# Patient Record
Sex: MALE | Race: WHITE | NOT HISPANIC OR LATINO | Employment: UNEMPLOYED | URBAN - NONMETROPOLITAN AREA
[De-identification: names, ages, dates, MRNs, and addresses within clinical notes are randomized per-mention and may not be internally consistent; named-entity substitution may affect disease eponyms.]

---

## 2017-01-10 ENCOUNTER — TRANSFERRED RECORDS (OUTPATIENT)
Dept: HEALTH INFORMATION MANAGEMENT | Facility: HOSPITAL | Age: 47
End: 2017-01-10

## 2017-05-20 ENCOUNTER — APPOINTMENT (OUTPATIENT)
Dept: CT IMAGING | Facility: CLINIC | Age: 47
End: 2017-05-20
Attending: NURSE PRACTITIONER
Payer: COMMERCIAL

## 2017-05-20 ENCOUNTER — HOSPITAL ENCOUNTER (EMERGENCY)
Facility: CLINIC | Age: 47
Discharge: PSYCHIATRIC OR SUBSTANCE USE FACILITY | End: 2017-05-20
Attending: NURSE PRACTITIONER | Admitting: NURSE PRACTITIONER
Payer: COMMERCIAL

## 2017-05-20 ENCOUNTER — APPOINTMENT (OUTPATIENT)
Dept: GENERAL RADIOLOGY | Facility: CLINIC | Age: 47
End: 2017-05-20
Attending: NURSE PRACTITIONER
Payer: COMMERCIAL

## 2017-05-20 VITALS
DIASTOLIC BLOOD PRESSURE: 60 MMHG | BODY MASS INDEX: 26.36 KG/M2 | HEART RATE: 90 BPM | WEIGHT: 189 LBS | RESPIRATION RATE: 18 BRPM | SYSTOLIC BLOOD PRESSURE: 105 MMHG | OXYGEN SATURATION: 99 %

## 2017-05-20 DIAGNOSIS — R45.851 SUICIDAL IDEATION: ICD-10-CM

## 2017-05-20 DIAGNOSIS — T14.91XA SUICIDE ATTEMPT (H): ICD-10-CM

## 2017-05-20 DIAGNOSIS — T71.162A SUICIDE ATTEMPT BY HANGING, INITIAL ENCOUNTER (H): ICD-10-CM

## 2017-05-20 DIAGNOSIS — R44.3 HALLUCINATIONS: ICD-10-CM

## 2017-05-20 DIAGNOSIS — R45.851 PLANNING TO COMMIT SUICIDE: ICD-10-CM

## 2017-05-20 LAB
ALBUMIN SERPL-MCNC: 3.7 G/DL (ref 3.4–5)
ALP SERPL-CCNC: 88 U/L (ref 40–150)
ALT SERPL W P-5'-P-CCNC: 80 U/L (ref 0–70)
AMPHETAMINES UR QL: ABNORMAL NG/ML
ANION GAP SERPL CALCULATED.3IONS-SCNC: 9 MMOL/L (ref 3–14)
AST SERPL W P-5'-P-CCNC: 132 U/L (ref 0–45)
BARBITURATES UR QL SCN: ABNORMAL NG/ML
BASOPHILS # BLD AUTO: 0 10E9/L (ref 0–0.2)
BASOPHILS NFR BLD AUTO: 0.5 %
BENZODIAZ UR QL SCN: ABNORMAL NG/ML
BILIRUB SERPL-MCNC: 1 MG/DL (ref 0.2–1.3)
BUN SERPL-MCNC: 21 MG/DL (ref 7–30)
BUPRENORPHINE UR QL: ABNORMAL NG/ML
CALCIUM SERPL-MCNC: 8.4 MG/DL (ref 8.5–10.1)
CANNABINOIDS UR QL: ABNORMAL NG/ML
CHLORIDE SERPL-SCNC: 104 MMOL/L (ref 94–109)
CO2 SERPL-SCNC: 26 MMOL/L (ref 20–32)
COCAINE UR QL SCN: ABNORMAL NG/ML
CREAT SERPL-MCNC: 0.62 MG/DL (ref 0.66–1.25)
D-METHAMPHET UR QL: ABNORMAL NG/ML
DIFFERENTIAL METHOD BLD: ABNORMAL
EOSINOPHIL # BLD AUTO: 0.1 10E9/L (ref 0–0.7)
EOSINOPHIL NFR BLD AUTO: 1 %
ERYTHROCYTE [DISTWIDTH] IN BLOOD BY AUTOMATED COUNT: 13 % (ref 10–15)
ETHANOL SERPL-MCNC: <0.01 G/DL
GFR SERPL CREATININE-BSD FRML MDRD: ABNORMAL ML/MIN/1.7M2
GLUCOSE SERPL-MCNC: 88 MG/DL (ref 70–99)
HCT VFR BLD AUTO: 35.7 % (ref 40–53)
HGB BLD-MCNC: 11.8 G/DL (ref 13.3–17.7)
IMM GRANULOCYTES # BLD: 0 10E9/L (ref 0–0.4)
IMM GRANULOCYTES NFR BLD: 0.3 %
LYMPHOCYTES # BLD AUTO: 1.7 10E9/L (ref 0.8–5.3)
LYMPHOCYTES NFR BLD AUTO: 21.3 %
MCH RBC QN AUTO: 29.6 PG (ref 26.5–33)
MCHC RBC AUTO-ENTMCNC: 33.1 G/DL (ref 31.5–36.5)
MCV RBC AUTO: 90 FL (ref 78–100)
METHADONE UR QL SCN: ABNORMAL NG/ML
MONOCYTES # BLD AUTO: 0.8 10E9/L (ref 0–1.3)
MONOCYTES NFR BLD AUTO: 10.2 %
NEUTROPHILS # BLD AUTO: 5.2 10E9/L (ref 1.6–8.3)
NEUTROPHILS NFR BLD AUTO: 66.7 %
OPIATES UR QL SCN: ABNORMAL NG/ML
OXYCODONE UR QL SCN: ABNORMAL NG/ML
PCP UR QL SCN: ABNORMAL NG/ML
PLATELET # BLD AUTO: 228 10E9/L (ref 150–450)
POTASSIUM SERPL-SCNC: 3.5 MMOL/L (ref 3.4–5.3)
PROPOXYPH UR QL: ABNORMAL NG/ML
PROT SERPL-MCNC: 7.6 G/DL (ref 6.8–8.8)
RBC # BLD AUTO: 3.99 10E12/L (ref 4.4–5.9)
SODIUM SERPL-SCNC: 139 MMOL/L (ref 133–144)
TRICYCLICS UR QL SCN: ABNORMAL NG/ML
WBC # BLD AUTO: 7.8 10E9/L (ref 4–11)

## 2017-05-20 PROCEDURE — 99285 EMERGENCY DEPT VISIT HI MDM: CPT | Mod: 25 | Performed by: NURSE PRACTITIONER

## 2017-05-20 PROCEDURE — 96374 THER/PROPH/DIAG INJ IV PUSH: CPT | Performed by: NURSE PRACTITIONER

## 2017-05-20 PROCEDURE — 70491 CT SOFT TISSUE NECK W/DYE: CPT

## 2017-05-20 PROCEDURE — 25000128 H RX IP 250 OP 636: Performed by: NURSE PRACTITIONER

## 2017-05-20 PROCEDURE — 25000125 ZZHC RX 250: Performed by: NURSE PRACTITIONER

## 2017-05-20 PROCEDURE — 80320 DRUG SCREEN QUANTALCOHOLS: CPT | Performed by: NURSE PRACTITIONER

## 2017-05-20 PROCEDURE — 74020 XR ABDOMEN 2 VW: CPT | Mod: TC

## 2017-05-20 PROCEDURE — 99285 EMERGENCY DEPT VISIT HI MDM: CPT | Mod: Z6 | Performed by: NURSE PRACTITIONER

## 2017-05-20 PROCEDURE — 70450 CT HEAD/BRAIN W/O DYE: CPT

## 2017-05-20 PROCEDURE — 85025 COMPLETE CBC W/AUTO DIFF WBC: CPT | Performed by: NURSE PRACTITIONER

## 2017-05-20 PROCEDURE — 96361 HYDRATE IV INFUSION ADD-ON: CPT | Performed by: NURSE PRACTITIONER

## 2017-05-20 PROCEDURE — 80053 COMPREHEN METABOLIC PANEL: CPT | Performed by: NURSE PRACTITIONER

## 2017-05-20 PROCEDURE — 80306 DRUG TEST PRSMV INSTRMNT: CPT | Performed by: NURSE PRACTITIONER

## 2017-05-20 RX ORDER — LORAZEPAM 2 MG/ML
1 INJECTION INTRAMUSCULAR ONCE
Status: COMPLETED | OUTPATIENT
Start: 2017-05-20 | End: 2017-05-20

## 2017-05-20 RX ORDER — IOPAMIDOL 755 MG/ML
500 INJECTION, SOLUTION INTRAVASCULAR ONCE
Status: COMPLETED | OUTPATIENT
Start: 2017-05-20 | End: 2017-05-20

## 2017-05-20 RX ADMIN — SODIUM CHLORIDE 68 ML: 9 INJECTION, SOLUTION INTRAVENOUS at 19:09

## 2017-05-20 RX ADMIN — LORAZEPAM 1 MG: 2 INJECTION INTRAMUSCULAR; INTRAVENOUS at 18:59

## 2017-05-20 RX ADMIN — IOPAMIDOL 80 ML: 755 INJECTION, SOLUTION INTRAVENOUS at 19:09

## 2017-05-20 RX ADMIN — SODIUM CHLORIDE 1000 ML: 9 INJECTION, SOLUTION INTRAVENOUS at 19:02

## 2017-05-20 ASSESSMENT — ENCOUNTER SYMPTOMS: NERVOUS/ANXIOUS: 1

## 2017-05-20 NOTE — ED NOTES
"Pt presents with suicidal attempt times two today. Pt attempted to hang self from branch near woods in [place]. Pt awoke bleeding from nose. Pt then hitch hiked to [place] and called sister who is very supportive and had lived with her for a time. Pt has had many mental health admissions. Pt admits to acid and ETOH yesterday and meth yesterday. Pt left treatment wedsnesday [facility] by [facility] and lois. Pt has been hopping from friend to friend and lying in ditches and in [place]. Pt is wet and appears disheveled. Pt having auditory and visual hallucinations. Sister states \"he voluntarily admits self to facilities then leaves.\" Pt hung himself from strap from luggage. Pt also notes will take 30 benadryl at a time sometimes. Last dose of that one month ago. Note cut marks to right wrist also from one month ago. Pt was started on haldol, remuron and trazaone but has not taken for 3 days.   "
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
## 2017-05-21 ENCOUNTER — HOSPITAL ENCOUNTER (INPATIENT)
Facility: HOSPITAL | Age: 47
LOS: 25 days | Discharge: PSYCHIATRIC OR SUBSTANCE USE FACILITY | DRG: 885 | End: 2017-06-15
Attending: PSYCHIATRY & NEUROLOGY | Admitting: PSYCHIATRY & NEUROLOGY
Payer: COMMERCIAL

## 2017-05-21 PROBLEM — T14.91XA SUICIDE ATTEMPT (H): Status: ACTIVE | Noted: 2017-05-21

## 2017-05-21 PROCEDURE — 12400000 ZZH R&B MH

## 2017-05-21 PROCEDURE — 27210995 ZZH RX 272: Performed by: NURSE PRACTITIONER

## 2017-05-21 PROCEDURE — 99223 1ST HOSP IP/OBS HIGH 75: CPT | Performed by: NURSE PRACTITIONER

## 2017-05-21 PROCEDURE — 25000132 ZZH RX MED GY IP 250 OP 250 PS 637: Performed by: NURSE PRACTITIONER

## 2017-05-21 RX ORDER — BACITRACIN ZINC 500 [USP'U]/G
OINTMENT TOPICAL 2 TIMES DAILY PRN
Status: DISCONTINUED | OUTPATIENT
Start: 2017-05-21 | End: 2017-06-15 | Stop reason: HOSPADM

## 2017-05-21 RX ORDER — MIRTAZAPINE 15 MG/1
15 TABLET, FILM COATED ORAL AT BEDTIME
Status: DISCONTINUED | OUTPATIENT
Start: 2017-05-21 | End: 2017-06-08

## 2017-05-21 RX ORDER — TRAZODONE HYDROCHLORIDE 50 MG/1
50 TABLET, FILM COATED ORAL
Status: DISCONTINUED | OUTPATIENT
Start: 2017-05-21 | End: 2017-05-22 | Stop reason: ALTCHOICE

## 2017-05-21 RX ORDER — TRAZODONE HYDROCHLORIDE 100 MG/1
100 TABLET ORAL AT BEDTIME
Status: DISCONTINUED | OUTPATIENT
Start: 2017-05-21 | End: 2017-05-22

## 2017-05-21 RX ORDER — BISACODYL 10 MG
10 SUPPOSITORY, RECTAL RECTAL DAILY PRN
Status: DISCONTINUED | OUTPATIENT
Start: 2017-05-21 | End: 2017-06-15 | Stop reason: HOSPADM

## 2017-05-21 RX ORDER — ACETAMINOPHEN 325 MG/1
650 TABLET ORAL EVERY 4 HOURS PRN
Status: DISCONTINUED | OUTPATIENT
Start: 2017-05-21 | End: 2017-06-15 | Stop reason: HOSPADM

## 2017-05-21 RX ORDER — ALUMINA, MAGNESIA, AND SIMETHICONE 2400; 2400; 240 MG/30ML; MG/30ML; MG/30ML
30 SUSPENSION ORAL EVERY 4 HOURS PRN
Status: DISCONTINUED | OUTPATIENT
Start: 2017-05-21 | End: 2017-06-15 | Stop reason: HOSPADM

## 2017-05-21 RX ORDER — CLONIDINE HYDROCHLORIDE 0.1 MG/1
0.1 TABLET ORAL 2 TIMES DAILY PRN
Status: DISCONTINUED | OUTPATIENT
Start: 2017-05-21 | End: 2017-06-15 | Stop reason: HOSPADM

## 2017-05-21 RX ORDER — OLANZAPINE 10 MG/2ML
10 INJECTION, POWDER, FOR SOLUTION INTRAMUSCULAR
Status: DISCONTINUED | OUTPATIENT
Start: 2017-05-21 | End: 2017-06-15 | Stop reason: HOSPADM

## 2017-05-21 RX ORDER — SOFT LENS RINSE,STORE SOLUTION
15 SOLUTION, NON-ORAL MISCELLANEOUS 2 TIMES DAILY PRN
Status: DISCONTINUED | OUTPATIENT
Start: 2017-05-21 | End: 2017-06-15 | Stop reason: HOSPADM

## 2017-05-21 RX ORDER — HYDROXYZINE HYDROCHLORIDE 25 MG/1
25-50 TABLET, FILM COATED ORAL EVERY 4 HOURS PRN
Status: DISCONTINUED | OUTPATIENT
Start: 2017-05-21 | End: 2017-06-15 | Stop reason: HOSPADM

## 2017-05-21 RX ORDER — NICOTINE 21 MG/24HR
1 PATCH, TRANSDERMAL 24 HOURS TRANSDERMAL DAILY
Status: DISCONTINUED | OUTPATIENT
Start: 2017-05-21 | End: 2017-06-15 | Stop reason: HOSPADM

## 2017-05-21 RX ORDER — HALOPERIDOL 5 MG/1
5 TABLET ORAL 3 TIMES DAILY PRN
Status: DISCONTINUED | OUTPATIENT
Start: 2017-05-21 | End: 2017-06-15 | Stop reason: HOSPADM

## 2017-05-21 RX ORDER — OLANZAPINE 10 MG/1
10 TABLET ORAL
Status: DISCONTINUED | OUTPATIENT
Start: 2017-05-21 | End: 2017-06-15 | Stop reason: HOSPADM

## 2017-05-21 RX ORDER — HALOPERIDOL 5 MG/1
5 TABLET ORAL AT BEDTIME
Status: DISCONTINUED | OUTPATIENT
Start: 2017-05-21 | End: 2017-06-15 | Stop reason: HOSPADM

## 2017-05-21 RX ADMIN — NICOTINE 1 PATCH: 21 PATCH, EXTENDED RELEASE TRANSDERMAL at 14:01

## 2017-05-21 RX ADMIN — TRAZODONE HYDROCHLORIDE 100 MG: 100 TABLET ORAL at 20:51

## 2017-05-21 RX ADMIN — NICOTINE POLACRILEX 2 MG: 2 GUM, CHEWING ORAL at 11:34

## 2017-05-21 RX ADMIN — CLONIDINE HYDROCHLORIDE 0.1 MG: 0.1 TABLET ORAL at 17:52

## 2017-05-21 RX ADMIN — MIRTAZAPINE 15 MG: 15 TABLET, FILM COATED ORAL at 20:50

## 2017-05-21 RX ADMIN — HALOPERIDOL 5 MG: 5 TABLET ORAL at 20:50

## 2017-05-21 RX ADMIN — Medication: at 20:52

## 2017-05-21 ASSESSMENT — ACTIVITIES OF DAILY LIVING (ADL)
AMBULATION: 0-->INDEPENDENT
DRESS: SCRUBS (BEHAVIORAL HEALTH)
GROOMING: INDEPENDENT
LAUNDRY: UNABLE TO COMPLETE
DRESS: INDEPENDENT;SCRUBS (BEHAVIORAL HEALTH)
TOILETING: 0-->INDEPENDENT
COGNITION: 0 - NO COGNITION ISSUES REPORTED
FALL_HISTORY_WITHIN_LAST_SIX_MONTHS: NO
ORAL_HYGIENE: INDEPENDENT
BATHING: 0-->INDEPENDENT
SWALLOWING: 0-->SWALLOWS FOODS/LIQUIDS WITHOUT DIFFICULTY
RETIRED_EATING: 0-->INDEPENDENT
GROOMING: INDEPENDENT
ORAL_HYGIENE: INDEPENDENT
DRESS: 0-->INDEPENDENT
TRANSFERRING: 0-->INDEPENDENT
RETIRED_COMMUNICATION: 0-->UNDERSTANDS/COMMUNICATES WITHOUT DIFFICULTY

## 2017-05-21 NOTE — PLAN OF CARE
Face to face end of shift report received from Luz COCHRAN RN. Rounding completed. Patient observed in group room.     Liz Santos  5/21/2017  4:04 PM
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
## 2017-05-21 NOTE — IP AVS SNAPSHOT
[facility]    [address] [place] [id number]    Phone:  [phone]    Fax:  [phone]                                       After Visit Summary   5/21/2017    Janusz Escalera    MRN: [id number]           After Visit Summary Signature Page     I have received my discharge instructions, and my questions have been answered. I have discussed any challenges I see with this plan with the nurse or doctor.    ..........................................................................................................................................  Patient/Patient Representative Signature      ..........................................................................................................................................  Patient Representative Print Name and Relationship to Patient    ..................................................               ................................................  Date                                            Time    ..........................................................................................................................................  Reviewed by Signature/Title    ...................................................              ..............................................  Date                                                            Time
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
## 2017-05-21 NOTE — PROGRESS NOTES
"   05/21/17 0416   Patient Belongings   Did you bring any home meds/supplements to the hospital?  Yes   Disposition of meds  Sent to security/pharmacy per site process   Patient Belongings clothing;contacts;cell phone/electronics;plastic bag;shoes;tote   Disposition of Belongings [employer] [place] cell phone; [place] [employer] License; [place] bank balance receipt; 2 wrenches; Black wallet containing: piece of paper with phone numbers written on it, [place] Bank debit card, multiple receipts and business cards, bus ticket, Social Security card, picture, [place] ID [place], paper from [place] DHS, 2 condoms, ID band from [facility], [facility] Preferred card sent to [facility]. All other items placed in patient belongings room in [facility].   Belongings Search Yes   Clothing Search Yes   General Info Comment [employer] [place] cell phone; [place] [employer] [facility] [employer] License; [place] bank balance receipt; 2 wrenches; Black wallet containing: piece of paper with phone numbers written on it, [place] Bank debit card, multiple receipts and business cards, bus ticket, Social Security card, picture, [place], paper from [place] DHS, 2 condoms, ID band from [facility], [employer] Preferred card; Red and black \"Guerrero\" tennis shoes; $1.40; contact case with 1 right eye contact; Bio[employer]True [employer] contact solution; ecko unltd XL Red zip up hooded sweat shirt; gray underwear; brown scrub bottoms cut into shorts; 5 pairs of black socks; Burgundy colored \"Alex\" cut off shorts; restrepo and black [place] XXL shorts; ; gray sleeveless [place] XL t-shirt; red, yellow, blue, and white button up long sleeved shirt; 1 condom; sharpie; multiple receipts; transit token; 3 decks of playing cards; The Message Bible (VERY WET); 3 plastic bags; black garbage bag; shell; ceramic container with gold colored trim; red bandana; Lg red permanent marker; blue transparent lighter; 3 AAA batteries; 4 different sized rocks; " "folded blue folder; name band; coupons; Metro transit schedules; [employer] radio; [employer] [place] state trails brochure; orange golf ball; red 808 speaker; Ziploc bag with 3 razors, shaving cream, soap, and toothpaste, and toothbrushes; blue sleeveless shirt; bag with deodorant and toiletries; various paperwork; Power Quotes book; \"365 days of Walking the [place],  Path to Leading a Spiritual Life Every Day,\" book; Black and red NICOLE underwear; Orange, blue, and white flannel long sleeved shirt; green \"Dickies\" shorts; 2 pairs of red socks; green socks; 2 pairs of blue socks; black tank top, 2 gray tank tops; black ear buds; 2 brown scrub tops; black and white \"Guns and Roses\" t-shirt; green cloth bag; blue underwear; navy blue underwear; various maps; \"twenty-four hours a day\" book.     List items sent to safe: [employer] cell phone; [place] [employer] [facility] License; [place] bank balance receipt; 2 wrenches; Black wallet containing: piece of paper with phone numbers written on it, [place] Bank debit card, multiple receipts and business cards, bus ticket, Social Security card, picture, [place] receipt, paper from [place] DHS, 2 condoms, ID band from [facility], [facility] Preferred card    All other belongings put in assigned cubby in belongings room.     I have reviewed my belongings list on admission and verify that it is correct.     Patient signature_______________________________    Second staff witness (if patient unable to sign) ______________________________       I have received all my belongings at discharge.    Patient signature________________________________    Keyanna FLORES  5/21/2017  4:18 AM      "

## 2017-05-21 NOTE — PLAN OF CARE
Face to face end of shift report received from Jaqueline DUDLEY RN. Rounding completed. Patient observed in room     Tatianna Morrow  5/21/2017  7:47 AM

## 2017-05-21 NOTE — IP AVS SNAPSHOT
MRN:[id number]                      After Visit Summary   5/21/2017    Janusz Escalera    MRN: [id number]           Thank you!     Thank you for choosing [facility] for your care. Our goal is always to provide you with excellent care. Hearing back from our patients is one way we can continue to improve our services. Please take a few minutes to complete the written survey that you may receive in the mail after you visit with us. Thank you!        Patient Information     Date Of Birth          1970        Designated Caregiver       Most Recent Value    Caregiver    Will someone help with your care after discharge? yes    Name of designated caregiver Sister and ex spouse    Phone number of caregiver [phone]    Caregiver address unknown      About your hospital stay     You were admitted on:  May 21, 2017 You last received care in the: [facility]    You were discharged on:  Cammy 15, 2017       Who to Call     For medical emergencies, please call 911.  For non-urgent questions about your medical care, please call your primary care provider or clinic, [phone]          Attending Provider     Provider Specialty    Erik Perez MD Psychiatry    Tigre Frederick NP Licensed Mental Health       Primary Care Provider Office Phone # Fax #    [address] [phone]      Further instructions from your care team       Behavioral Discharge Planning and Instructions    Summary: Janusz was admitted for a suicide attempt.    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     Symptoms to  Report: feeling more aggressive, increased confusion, losing more sleep, mood getting worse or thoughts of suicide    Lifestyle Adjustment: Take all medications as prescribed, meet with doctor/ medication provider, out patient therapist, , and [facility] worker as scheduled. Abstain from alcohol or any unprescribed drugs.     Psychiatry Follow-up:     [facility]   - [place] [phone]  [address] [place] [address]  Phone [phone]     Fax- [phone]    Resources:   Crisis Intervention: 993.883.5263 or 771-988-6902 (TTY: 527.827.7440).  Call anytime for help.  National [place] on Mental Illness (www.[place].dangelo.org): 866.945.2142 or 140-128-3721.  Alcoholics Anonymous (www.alcoholics-anonymous.org): Check your phone book for your local chapter.  Suicide Awareness Voices of Education (SAVE) (www.save.org): 215-868-IDSJ (9993)  National Suicide Prevention Line (www.mentalhealthmn.org): 418-071-YCBQ (1347)  Mental Health Consumer/Survivor Network of [place] (www.mhcsn.net): 785.848.7112 or 398-270-6435  Mental Health Association of [place] (www.mentalhealth.org): 375.351.9814 or 973-371-6531    General Medication Instructions:   See your medication sheet(s) for instructions.   Take all medicines as directed.  Make no changes unless your doctor suggests them.   Go to all your doctor visits.  Be sure to have all your required lab tests. This way, your medicines can be refilled on time.  Do not use any drugs not prescribed by your doctor.  Avoid alcohol.    [place] Area:  [facility], Crisis stabilization Lists of hospitals in the [place]- 905.920.9636  [facility] Crisis Line: 1-552.927.3554  Advocates For Family Peace: 678-2060  Sexual Assault Program [facility]: 183.198.2778 or 1-128.414.2535  [place] Battered Women's Program: 2-373-878-7657 Ext: 279       Calls answered Mon-Fri-8:00 am--4:30 pm    [place]:  Advocates for Family Peace: 3-308-846-4425  [facility]  "call for help: 7-253-803-7876  [facility] Crisis [facility]:  (330) 438-2229      [place] Area:  Warm Line: 1-336.412.6986       Calls answered Tuesday--Saturday 4:00 pm--10:00 pm  [place] Crisis Line - 460.160.3405  [place] Crisis Stabilization 854-772-6007    [place] Statewide:  [place] Crisis and Referral Services: 1-217.660.9890  National Suicide Prevention Lifeline: 7-251-631-MBGZ (0474)   - esh2xkrt- Text  Life  to 52681  First Call for Help:   [place] Helpline- 0-198-VWDQ-HELP     Pending Results     No orders found from 2017 to 2017.            Statement of Approval     Ordered          17 1331  I have reviewed and agree with all the recommendations and orders detailed in this document.  EFFECTIVE NOW     Approved and electronically signed by:  Tigre Frederick NP             Admission Information     Date & Time Department Dept. Phone    2017 [facility] [phone]      Your Vitals Were     Blood Pressure Pulse Temperature Respirations Height Weight    131/74 79 98.8  F (37.1  C) (Tympanic) 18 1.803 m (5' 11\") 89.5 kg (197 lb 5 oz)    Pulse Oximetry BMI (Body Mass Index)                98% 27.52 kg/m2          [employer] Information     [employer] lets you send messages to your doctor, view your test results, renew your prescriptions, schedule appointments and more. To sign up, go to [email or web address] . Click on \"Log in\" on the left side of the screen, which will take you to the Welcome page. Then click on \"Sign up Now\" on the right side of the page.     You will be asked to enter the access code listed below, as well as some personal information. Please follow the directions to create your username and password.     Your access code is: [email or web address]  Expires: 2017  4:22 AM     Your access code will  in 90 days. If you need help or a new code, please call your [place] clinic or [phone].        Care EveryWhere ID     This is your Care EveryWhere ID. This " could be used by other organizations to access your [facility] medical records  [id number]           Review of your medicines      START taking        Dose / Directions    benztropine 1 MG tablet   Commonly known as:  COGENTIN        Dose:  1 mg   Take 1 tablet (1 mg) by mouth At Bedtime   Refills:  0       buPROPion 150 MG 24 hr tablet   Commonly known as:  WELLBUTRIN XL        Dose:  150 mg   Take 1 tablet (150 mg) by mouth daily   Quantity:  30 tablet   Refills:  0       gabapentin 300 MG capsule   Commonly known as:  NEURONTIN        Dose:  600 mg   Take 2 capsules (600 mg) by mouth 3 times daily   Quantity:  90 capsule   Refills:  0       naltrexone 50 MG tablet   Commonly known as:  DEPADE;REVIA        Take 1/2 tab by mouth daily.   Refills:  0       nicotine polacrilex 2 MG gum   Commonly known as:  NICORETTE        Dose:  2-4 mg   Place 1-2 each (2-4 mg) inside cheek every hour as needed for other (nicotine withdrawal symptoms)   Quantity:  30 tablet   Refills:  0         CONTINUE these medicines which may have CHANGED, or have new prescriptions. If we are uncertain of the size of tablets/capsules you have at home, strength may be listed as something that might have changed.        Dose / Directions    haloperidol 5 MG tablet   Commonly known as:  HALDOL   This may have changed:    - medication strength  - when to take this  - reasons to take this        Dose:  5 mg   Take 1 tablet (5 mg) by mouth 3 times daily as needed for agitation   Refills:  0       REMERON 30 MG tablet   This may have changed:  medication strength   Generic drug:  mirtazapine        Dose:  30 mg   Take 1 tablet (30 mg) by mouth At Bedtime   Quantity:  30 tablet   Refills:  0         CONTINUE these medicines which have NOT CHANGED        Dose / Directions    traZODone 50 MG tablet   Commonly known as:  DESYREL        Dose:  100 mg   Take 2 tablets (100 mg) by mouth At Bedtime   Quantity:  90 tablet   Refills:  0                Protect  others around you: Learn how to safely use, store and throw away your medicines at www.disposemymeds.org.             Medication List: This is a list of all your medications and when to take them. Check marks below indicate your daily home schedule. Keep this list as a reference.      Medications           Morning Afternoon Evening Bedtime As Needed    benztropine 1 MG tablet   Commonly known as:  COGENTIN   Take 1 tablet (1 mg) by mouth At Bedtime   Last time this was given:  1 mg on 6/14/2017  8:33 PM                                buPROPion 150 MG 24 hr tablet   Commonly known as:  WELLBUTRIN XL   Take 1 tablet (150 mg) by mouth daily   Last time this was given:  150 mg on 6/15/2017  8:01 AM                                gabapentin 300 MG capsule   Commonly known as:  NEURONTIN   Take 2 capsules (600 mg) by mouth 3 times daily   Last time this was given:  600 mg on 6/15/2017  8:01 AM                                haloperidol 5 MG tablet   Commonly known as:  HALDOL   Take 1 tablet (5 mg) by mouth 3 times daily as needed for agitation   Last time this was given:  5 mg on 6/14/2017  8:33 PM                                naltrexone 50 MG tablet   Commonly known as:  DEPADE;REVIA   Take 1/2 tab by mouth daily.   Last time this was given:  25 mg on 6/15/2017  8:02 AM                                nicotine polacrilex 2 MG gum   Commonly known as:  NICORETTE   Place 1-2 each (2-4 mg) inside cheek every hour as needed for other (nicotine withdrawal symptoms)   Last time this was given:  4 mg on 6/14/2017  4:26 PM                                REMERON 30 MG tablet   Take 1 tablet (30 mg) by mouth At Bedtime   Last time this was given:  30 mg on 6/14/2017  8:33 PM   Generic drug:  mirtazapine                                traZODone 50 MG tablet   Commonly known as:  DESYREL   Take 2 tablets (100 mg) by mouth At Bedtime   Last time this was given:  100 mg on 5/26/2017  8:29 PM

## 2017-05-21 NOTE — PLAN OF CARE
Problem: Goal Outcome Summary  Goal: Goal Outcome Summary  Outcome: No Change  Pt denies SI HI SIB hallucinations and pain. Pt does say he feels depressed and anxiety usually goes with it. At present pt states it is mild at this time. Pt talks about being d/c'd from last facility and staying in a dumpster covered with a cardboard box because they are warm and how it was raining. Pt shows insight into self and his triggers. Pt pleasant and cooperative with assessments. Pt attending groups.     Problem: Depression (Adult,Obstetrics,Pediatric)  Goal: Establish/Maintain Self-Care Routine  Patient will maintain ADL routine by d/c.   Outcome: No Change  Pt able to maintain his ADL's  Goal: Improved/Stable Mood  Patient will verbalize a decrease in depression by d/c.   Outcome: No Change  Pt reports mild depression this shift    Problem: Anxiety (Adult)  Goal: Reduction/Resolution  Patient will verbalize a decrease in anxiety by d/c.  Patient will attend at least 50% of groups daily until d/c.   Outcome: No Change  Pt reports mild anxiety this shift.  Pt is attending groups without prompts    Problem: Suicide Risk (Adult)  Goal: Strength-Based Wellness/Recovery  Patient will be able to verbalize at least 2 coping mechanisms by d/c.  Patient will attend at least 50% of groups daily.   Outcome: No Change  Pt attending groups at this time to learn coping skills.  Goal: Physical Safety  Patient will verbalize no SI each shift until d/c.   Outcome: Improving  Pt denies SI HI SIB at this time

## 2017-05-21 NOTE — PLAN OF CARE
Pt has been in bed since 315am with eyes closed and regular respirations. 15 minute and PRN checks performed all night. No c/o t/o NOC.      Jaqueline Underwood  5/21/2017  5:35 AM

## 2017-05-22 PROCEDURE — 12400000 ZZH R&B MH

## 2017-05-22 PROCEDURE — 25000132 ZZH RX MED GY IP 250 OP 250 PS 637: Performed by: NURSE PRACTITIONER

## 2017-05-22 PROCEDURE — 99232 SBSQ HOSP IP/OBS MODERATE 35: CPT | Performed by: NURSE PRACTITIONER

## 2017-05-22 PROCEDURE — 27210995 ZZH RX 272: Performed by: NURSE PRACTITIONER

## 2017-05-22 PROCEDURE — 25000125 ZZHC RX 250: Performed by: NURSE PRACTITIONER

## 2017-05-22 RX ORDER — TRAZODONE HYDROCHLORIDE 100 MG/1
100 TABLET ORAL
Status: DISCONTINUED | OUTPATIENT
Start: 2017-05-22 | End: 2017-06-15 | Stop reason: HOSPADM

## 2017-05-22 RX ADMIN — MIRTAZAPINE 15 MG: 15 TABLET, FILM COATED ORAL at 20:15

## 2017-05-22 RX ADMIN — DEXTRAN 70, AND HYPROMELLOSE 2910 1 DROP: 1; 3 SOLUTION/ DROPS OPHTHALMIC at 14:15

## 2017-05-22 RX ADMIN — HALOPERIDOL 5 MG: 5 TABLET ORAL at 20:15

## 2017-05-22 RX ADMIN — NICOTINE 1 PATCH: 21 PATCH, EXTENDED RELEASE TRANSDERMAL at 09:26

## 2017-05-22 RX ADMIN — Medication: at 11:49

## 2017-05-22 RX ADMIN — Medication: at 15:00

## 2017-05-22 ASSESSMENT — ACTIVITIES OF DAILY LIVING (ADL)
DRESS: INDEPENDENT
ORAL_HYGIENE: INDEPENDENT
GROOMING: INDEPENDENT
LAUNDRY: UNABLE TO COMPLETE

## 2017-05-22 NOTE — PLAN OF CARE
Face to face end of shift report received from Brionna BELL RN.. Rounding completed. Patient observed in the day room playing cards with peers and participating in groups. Behavior is calm and appropriate.     Amee Alcaraz  5/22/2017  5:03 PM

## 2017-05-22 NOTE — PROGRESS NOTES
"[facility]  Psychiatric Progress Note      Impression:   Janusz Escalera is a 46 year old male who presented via [facility] ED with reports of 2 suicide attempts the day of admission. Apparently attempted to hang himself both times while in the woods. Notes indicate that he blacked out after the first attempt and woke with a bloody nose, freed himself and called his sister to bring to the ED. Second attempt details are unclear. Recent Meth use. History of SIB by swallowing foreign objects. CT of neck and xray of abdomen completed with no abnormalities. Sister reported that he will often voluntarily admit self to a facility and then leave. He was placed on a hold.     Reports that he is doing well this morning, but feels hung-over. Suspects Haldol but more likely the combination of Haldol, Remeron and Trazodone. Agreed to change Trazodone to PRN to see if he is able to sleep without it as this is most likely to cause daytime \"hang-over.\"     Denies SI/HI today - reports that he is feeling more hopeful because he is getting help. Slept well last night. Is eating well and requested double portions. Reports that psychotic symptoms are gone, which he also feels relieved about. Insight very good - discusses how LSD and Methamphetamine are contributing to his poor mental health and leave him feeling hopeless and impulsive to the point of attempting to take his own life. Has been attending groups and participating. Is pleasant and appropriate on unit. Asks to \"please\" petition for commitment as he knows he is unable to follow through with sobriety and treatment without force.           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          Medications:     Prescription Medications as of 5/22/2017             HALOPERIDOL PO Take 5 mg by mouth At Bedtime    Mirtazapine (REMERON PO) Take 30 mg by mouth At Bedtime    TRAZODONE HCL PO Take 100 mg by mouth At Bedtime      Facility Administered Medications as of 5/22/2017             traZODone (DESYREL) tablet 100 mg Take 1 tablet (100 mg) by mouth nightly as needed for sleep    hydrOXYzine (ATARAX) tablet 25-50 mg Take 1-2 tablets (25-50 mg) by mouth every 4 hours as needed for anxiety    acetaminophen (TYLENOL) tablet 650 mg Take 2 tablets (650 mg) by mouth every 4 hours as needed for mild pain    alum & mag hydroxide-simethicone (MYLANTA ES/MAALOX  ES) suspension 30 mL Take 30 mLs by mouth every 4 hours as needed for indigestion    magnesium hydroxide (MILK OF MAGNESIA) suspension 30 mL Take 30 mLs by mouth nightly as needed for constipation    bisacodyl (DULCOLAX) Suppository 10 mg Place 1 suppository (10 mg) rectally daily as needed for constipation    OLANZapine (zyPREXA) tablet 10 mg Take 1 tablet (10 mg) by mouth every 2 hours as needed for agitation (associated with psychosis or rufus)    Linked Group 1:  \"Or\" Linked Group Details     OLANZapine (zyPREXA) injection 10 mg Inject 10 mg into the muscle every 2 hours as needed for agitation (associated with psychosis or rufus)    Linked Group 1:  \"Or\" Linked Group Details     nicotine polacrilex (NICORETTE) gum 2-4 mg Place 1-2 each (2-4 mg) inside cheek every hour as needed for other (nicotine withdrawal symptoms)    hypromellose-dextran (ARTIFICAL TEARS) ophthalmic solution 1 drop Place 1 drop into both eyes 3 times daily    eye wash (BSS PLUS) ophthalmic solution 15 mL Place 15 mLs into both eyes 2 times daily as needed (contact storage) " "   haloperidol (HALDOL) tablet 5 mg Take 1 tablet (5 mg) by mouth At Bedtime    mirtazapine (REMERON) tablet 15 mg Take 1 tablet (15 mg) by mouth At Bedtime    haloperidol (HALDOL) tablet 5 mg Take 1 tablet (5 mg) by mouth 3 times daily as needed for agitation    nicotine Patch in Place Place onto the skin every 8 hours    nicotine patch REMOVAL Place onto the skin daily    nicotine (NICODERM CQ) 21 MG/24HR 24 hr patch 1 patch Place 1 patch onto the skin daily    skin closure adhesive (DERMABOND PEN) pen Apply topically daily    cloNIDine (CATAPRES) tablet 0.1 mg Take 1 tablet (0.1 mg) by mouth 2 times daily as needed (anxiety / methamphetamine withdrawal symptoms)    bacitracin ointment Apply topically 2 times daily as needed for wound care    traZODone (DESYREL) tablet 50 mg (Discontinued) Take 1 tablet (50 mg) by mouth nightly as needed for sleep    traZODone (DESYREL) tablet 100 mg (Discontinued) Take 1 tablet (100 mg) by mouth At Bedtime        10 point ROS positive for blisters between toes - remain intact, and abraised are left external nostril.       Allergies:     Allergies   Allergen Reactions     Cashews [Nuts] Swelling     Tongue, throat swelling            Psychiatric Examination:   /74  Pulse 64  Temp 97.9  F (36.6  C) (Tympanic)  Resp 14  Ht 1.803 m (5' 11\")  Wt 80.6 kg (177 lb 11.1 oz)  SpO2 98%  BMI 24.78 kg/m2  Weight is 177 lbs 11.05 oz  Body mass index is 24.78 kg/(m^2).    Appearance:  awake, alert, adequately groomed and dressed in hospital scrubs  Attitude:  cooperative  Eye Contact:  good  Mood:  better  Affect:  appropriate and in normal range  Speech:  clear, coherent  Psychomotor Behavior:  no evidence of tardive dyskinesia, dystonia, or tics  Thought Process:  logical, linear and goal oriented  Associations:  no loose associations  Thought Content:  no evidence of suicidal ideation or homicidal ideation and no evidence of psychotic thought  Insight:  good  Judgment:  intact " today -  History of impulsivity related to addictive nature and substance abuse  Oriented to:  time, person, and place  Attention Span and Concentration:  intact  Recent and Remote Memory:  intact  Fund of Knowledge: appropriate  Muscle Strength and Tone: normal  Gait and Station: Normal           Labs:     Results for orders placed or performed during the hospital encounter of 05/20/17   XR Abdomen 2 Views    Narrative    ABDOMEN TWO VIEW 5/20/2017 7:24 PM     COMPARISON: 2 view abdomen 12/24/2016.    HISTORY: Rule out foreign body.    FINDINGS: Intravenous contrast in the renal excretory phase is noted.  Abdominal bowel gas pattern is nonspecific. There is no evidence for  free intraperitoneal air.      Impression    IMPRESSION: No evidence for bowel obstruction or free air. No  radiopaque foreign bodies identified.    LUIZ BOYER MD   CT Head w/o Contrast    Narrative    CT OF THE HEAD WITHOUT CONTRAST 5/20/2017 7:20 PM     COMPARISON: None    HISTORY:  Post attempted hanging.    TECHNIQUE: Axial CT images of the head from the skull base to the  vertex were acquired without IV contrast.    FINDINGS: The ventricles and basal cisterns are within normal limits  in configuration. There is no midline shift. There are no extra-axial  fluid collections.  Gray-white differentiation is well maintained.    No intracranial hemorrhage, mass or recent infarct.    The visualized paranasal sinuses are well-aerated. There is no  mastoiditis. There are no fractures of the visualized bones.      Impression    IMPRESSION:  Normal head CT.      Radiation dose for this scan was reduced using automated exposure  control, adjustment of the mA and/or kV according to patient size, or  iterative reconstruction technique    LUIZ BOYER MD   Soft tissue neck CT w contrast    Narrative    CT OF THE NECK WITH CONTRAST  5/20/2017 7:20 PM     COMPARISON: None    HISTORY: Post attempted hanging.    TECHNIQUE:  Axial CT images of the neck  were acquired after the  intravenous administration of 80 mL Isovue 370  nonionic iodinated  contrast material. Coronal reconstructions were created.    FINDINGS: There is no cervical lymphadenopathy. There are no fluid  collections or abscesses in the neck. There is no evidence for  hematoma in the soft tissues of the neck. The salivary glands are  unremarkable. The thyroid gland is within normal limits. The cervical  arterial vasculature is unremarkable. The internal jugular veins  bilaterally are unremarkable. There is no sinusitis or mastoiditis.  There are no fractures of the visualized bones. There are degenerative  changes throughout the cervical spine.      Impression    IMPRESSION: Normal soft tissue neck CT.      Radiation dose for this scan was reduced using automated exposure  control, adjustment of the mA and/or kV according to patient size, or  iterative reconstruction technique    LUIZ BOYER MD   CBC with platelets differential   Result Value Ref Range    WBC 7.8 4.0 - 11.0 10e9/L    RBC Count 3.99 (L) 4.4 - 5.9 10e12/L    Hemoglobin 11.8 (L) 13.3 - 17.7 g/dL    Hematocrit 35.7 (L) 40.0 - 53.0 %    MCV 90 78 - 100 fl    MCH 29.6 26.5 - 33.0 pg    MCHC 33.1 31.5 - 36.5 g/dL    RDW 13.0 10.0 - 15.0 %    Platelet Count 228 150 - 450 10e9/L    Diff Method Automated Method     % Neutrophils 66.7 %    % Lymphocytes 21.3 %    % Monocytes 10.2 %    % Eosinophils 1.0 %    % Basophils 0.5 %    % Immature Granulocytes 0.3 %    Absolute Neutrophil 5.2 1.6 - 8.3 10e9/L    Absolute Lymphocytes 1.7 0.8 - 5.3 10e9/L    Absolute Monocytes 0.8 0.0 - 1.3 10e9/L    Absolute Eosinophils 0.1 0.0 - 0.7 10e9/L    Absolute Basophils 0.0 0.0 - 0.2 10e9/L    Abs Immature Granulocytes 0.0 0 - 0.4 10e9/L   Comprehensive metabolic panel   Result Value Ref Range    Sodium 139 133 - 144 mmol/L    Potassium 3.5 3.4 - 5.3 mmol/L    Chloride 104 94 - 109 mmol/L    Carbon Dioxide 26 20 - 32 mmol/L    Anion Gap 9 3 - 14 mmol/L     Glucose 88 70 - 99 mg/dL    Urea Nitrogen 21 7 - 30 mg/dL    Creatinine 0.62 (L) 0.66 - 1.25 mg/dL    GFR Estimate >90  Non  GFR Calc   >60 mL/min/1.7m2    GFR Estimate If Black >90   GFR Calc   >60 mL/min/1.7m2    Calcium 8.4 (L) 8.5 - 10.1 mg/dL    Bilirubin Total 1.0 0.2 - 1.3 mg/dL    Albumin 3.7 3.4 - 5.0 g/dL    Protein Total 7.6 6.8 - 8.8 g/dL    Alkaline Phosphatase 88 40 - 150 U/L    ALT 80 (H) 0 - 70 U/L     (H) 0 - 45 U/L   Alcohol ethyl   Result Value Ref Range    Ethanol g/dL <0.01 <0.01 g/dL   Urine Drugs of Abuse Screen Panel 13   Result Value Ref Range    Cannabinoids (63-asm-2-carboxy-9-THC) (A) NDET ng/mL     Detected, Abnormal Result   Cutoff for a positive cannabinoid is greater than 50 ng/ml.   This is an unconfirmed screening result to be used for medical purposes only.   Order [id number] for confirmation or individual confirmation tests to [facility].      Phencyclidine (Phencyclidine)  NDET ng/mL     Not Detected   Cutoff for a negative PCP is 25 ng/mL or less.      Cocaine (Benzoylecgonine)  NDET ng/mL     Not Detected   Cutoff for a negative cocaine is 150 ng/ml or less.      Methamphetamine (d-Methamphetamine) (A) NDET ng/mL     Detected, Abnormal Result   Cutoff for a positive methamphetamine is greater than 500 ng/ml.   This is an unconfirmed screening result to be used for medical purposes only.   Order [id number] for confirmation or individual confirmation tests to [facility].      Opiates (Morphine)  NDET ng/mL     Not Detected   Cutoff for a negative opiate is 100 ng/ml or less.      Amphetamine (d-Amphetamine) (A) NDET ng/mL     Detected, Abnormal Result   Cutoff for a positive amphetamine is greater than 500 ng/ml.   This is an unconfirmed screening result to be used for medical purposes only.   Order [id number] for confirmation or individual confirmation tests to [facility].      Benzodiazepines (Nordiazepam)  NDET ng/mL     Not Detected   Cutoff for a negative  benzodiazepine is 150 ng/ml or less.      Tricyclic Antidepressants (Desipramine)  NDET ng/mL     Not Detected   Cutoff for a negative tricyclic antidepressant is 300 ng/ml or less.      Methadone (Methadone)  NDET ng/mL     Not Detected   Cutoff for a negative methadone is 200 ng/ml or less.      Barbiturates (Butalbital)  NDET ng/mL     Not Detected   Cutoff for a negative barbituate is 200 ng/ml or less.      Oxycodone (Oxycodone)  NDET ng/mL     Not Detected   Cutoff for a negative Oxycodone is 100 ng/mL or less.      Propoxyphene (Norpropoxyphene)  NDET ng/mL     Not Detected   Cutoff for a negative propoxyphene is 300 ng/ml or less      Buprenorphine (Buprenorphine)  NDET ng/mL     Not Detected   Cutoff for a negative buprenorphine is 10 ng/ml or less       No new labs today.            Plan:     Continue Haldol 5mg at bed and tid prn for psychosis.  Continue Remeron 15mg at bed.  Change trazodone to PRN as causing oversedation/hang-over  Petition for commitment filed

## 2017-05-22 NOTE — PLAN OF CARE
Problem: Discharge Planning  Goal: Discharge Planning (Adult, OB, Behavioral, Peds)  Outcome: No Change  Spoke with patient's sister - initially she was very upset that the provider has not called her back - reviewed process with the county and filing for commitment - unclear who is county of responsibility - also the patient is not on a hold so the provider will need to put him on a hold first thing in the morning and then we will send it in.  Sister was glad to hear this and we will remain in contact.

## 2017-05-22 NOTE — PLAN OF CARE
Problem: Goal Outcome Summary  Goal: Goal Outcome Summary  Outcome: No Change  Pt. Denies HI, SI, and hallucinations. Pt. Calm and cooperative. Pt. Reports he will update staff to thoughts/feelings of wanting to harm self and or others. Pt. Reports to mild anxiety and depression. Pt. Reports to mild pain in feet. Pt. Reports the medication placed on blisters decreases his pain. No open blisters to feet bilaterally. Pt. Reports he is increasingly tired this am. Provider aware. Pt. In room appears to be resting for most of shift out for meals. Pt. Denies issues with bowel and bladder.     Problem: Depression (Adult,Obstetrics,Pediatric)  Goal: Establish/Maintain Self-Care Routine  Patient will maintain ADL routine by d/c.   Outcome: No Change  Pt. Goal met.     Problem: Anxiety (Adult)  Goal: Reduction/Resolution  Patient will verbalize a decrease in anxiety by d/c.  Patient will attend at least 50% of groups daily until d/c.   Outcome: No Change  See goal summary    Problem: Suicide Risk (Adult)  Goal: Strength-Based Wellness/Recovery  Patient will be able to verbalize at least 2 coping mechanisms by d/c.  Patient will attend at least 50% of groups daily.   Outcome: No Change  Goal not met this am.   Goal: Physical Safety  Patient will verbalize no SI each shift until d/c.   Outcome: No Change  Goal met.

## 2017-05-22 NOTE — PLAN OF CARE
Pt has been in bed with eyes closed and regular respirations. 15 minute and PRN checks all night. No complaints offered. Will continue with Pt. Into next shift.      Brionna Correa  5/22/2017  5:19 AM

## 2017-05-22 NOTE — PLAN OF CARE
"Problem: Goal Outcome Summary  Goal: Goal Outcome Summary  Outcome: No Change  Patient pleasant and cooperative with assessment questioning. Denies SI, HI, depression, hallucinations, and pain. Admits to \"a bit of anxiety\", states he has some ringing in his ears, and that he ate his dinner really fast, and that is unusual for him. Affect is full range, mood is calm. Did request something for the sore under his right nostril, provider updated, bacitracin applied. Has been attending groups. Has been appropriate with staff and peers. Sister called for an update at 1705, states she wants to speak to a provider tomorrow, and that she is requesting that he get placed in a locked treatment facility so he does not have \"freedom to follow through\" with a suicide plan if placed on an open unit.   1752-requested and accepted Clonidine 0.1 mg for anxiety.   1930-patient in uberVU reading newspaper.  2050-bacitracin applied to sore under right nostril along with bedtime medications and dermapen to blisters between toes and tips of toes.   2100-patient in bed.      Problem: Depression (Adult,Obstetrics,Pediatric)  Goal: Establish/Maintain Self-Care Routine  Patient will maintain ADL routine by d/c.   Outcome: No Change  Patient able to maintain ADLs.  Goal: Improved/Stable Mood  Patient will verbalize a decrease in depression by d/c.   Outcome: No Change  Patient denies depression.      "
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
## 2017-05-22 NOTE — PLAN OF CARE
Face to face end of shift report received from Liz MCGILL RN. Rounding completed. Patient observed in bed resting.     Brionna Correa  5/22/2017  12:00 AM

## 2017-05-22 NOTE — PROGRESS NOTES
"Pt referred via nutrition indicator list with weight loss, reduced oral intake.      Ht-71\", Wt-178#.  IBWR is 155-189#.  Weight hx reviewed - weight 190# approximately 1 year ago.      Labs/meds reviewed.  Albumin wnl at 3.7. Remeron with potential to increase appetite.     Regular diet with double portions ordered per pt request.  Intake has been 100% meals.      Weight is wnl.  Intake acceptable.  Potential for weight gain exists with double portions ordered.  Follow weights weekly.  RD will monitor.   "

## 2017-05-23 PROCEDURE — 27210995 ZZH RX 272: Performed by: NURSE PRACTITIONER

## 2017-05-23 PROCEDURE — 74022 RADEX COMPL AQT ABD SERIES: CPT | Mod: TC

## 2017-05-23 PROCEDURE — 99233 SBSQ HOSP IP/OBS HIGH 50: CPT | Performed by: NURSE PRACTITIONER

## 2017-05-23 PROCEDURE — 12400000 ZZH R&B MH

## 2017-05-23 PROCEDURE — 25000132 ZZH RX MED GY IP 250 OP 250 PS 637: Performed by: NURSE PRACTITIONER

## 2017-05-23 RX ADMIN — Medication: at 09:26

## 2017-05-23 RX ADMIN — CARBAMIDE PEROXIDE 6.5% 3 DROP: 6.5 LIQUID AURICULAR (OTIC) at 14:11

## 2017-05-23 RX ADMIN — NICOTINE 1 PATCH: 21 PATCH, EXTENDED RELEASE TRANSDERMAL at 08:40

## 2017-05-23 RX ADMIN — DEXTRAN 70, AND HYPROMELLOSE 2910 1 DROP: 1; 3 SOLUTION/ DROPS OPHTHALMIC at 14:12

## 2017-05-23 RX ADMIN — DEXTRAN 70, AND HYPROMELLOSE 2910 1 DROP: 1; 3 SOLUTION/ DROPS OPHTHALMIC at 08:43

## 2017-05-23 RX ADMIN — CARBAMIDE PEROXIDE 6.5% 3 DROP: 6.5 LIQUID AURICULAR (OTIC) at 20:23

## 2017-05-23 RX ADMIN — TRAZODONE HYDROCHLORIDE 100 MG: 100 TABLET ORAL at 20:29

## 2017-05-23 RX ADMIN — DEXTRAN 70, AND HYPROMELLOSE 2910 1 DROP: 1; 3 SOLUTION/ DROPS OPHTHALMIC at 20:23

## 2017-05-23 RX ADMIN — MIRTAZAPINE 15 MG: 15 TABLET, FILM COATED ORAL at 20:24

## 2017-05-23 RX ADMIN — HALOPERIDOL 5 MG: 5 TABLET ORAL at 20:23

## 2017-05-23 ASSESSMENT — ACTIVITIES OF DAILY LIVING (ADL)
GROOMING: SHOWER
GROOMING: INDEPENDENT
ORAL_HYGIENE: INDEPENDENT
DRESS: SCRUBS (BEHAVIORAL HEALTH)
DRESS: SCRUBS (BEHAVIORAL HEALTH)
ORAL_HYGIENE: INDEPENDENT
LAUNDRY: UNABLE TO COMPLETE

## 2017-05-23 NOTE — PLAN OF CARE
Problem: Discharge Planning  Goal: Discharge Planning (Adult, OB, Behavioral, Peds)  Outcome: No Change  Emailed paper work for pre-petition screening to Sara Melissa as requested by .

## 2017-05-23 NOTE — PLAN OF CARE
Problem: Goal Outcome Summary  Goal: Goal Outcome Summary  Outcome: Improving  Patient has been up on the unit and interacting with staff and peers. He is pleasant and is seen being helpful to others and playing cards as well as participating in groups. He current admits to some depression but denies any thoughts of suicide. He has had no falls or pain this shift. Patient shows no signs of alcohol withdrawal. He has no tremors or fingers or tongue and vital signs are fine. He is very sleepy and requested his HS medications early.    Problem: Depression (Adult,Obstetrics,Pediatric)  Goal: Establish/Maintain Self-Care Routine  Patient will maintain ADL routine by d/c.   Outcome: Improving  Patient has maintained adequate adl's this shift.  Goal: Improved/Stable Mood  Patient will verbalize a decrease in depression by d/c.   Outcome: Improving  Patient admits that depression has improved since admission.    Problem: Anxiety (Adult)  Goal: Reduction/Resolution  Patient will verbalize a decrease in anxiety by d/c.   Outcome: Improving  Patient admits to some anxiety this shift.    Problem: Suicide Risk (Adult)  Goal: Strength-Based Wellness/Recovery  Patient will be able to verbalize at least 2 coping mechanisms by d/c.  Patient will attend at least 50% of groups daily.   Outcome: Improving  Patient is able to verbalize two coping skills this shift.  Goal: Physical Safety  Patient will verbalize no SI each shift until d/c.   Outcome: Improving  Patient denies suicidal thoughts this shift.

## 2017-05-23 NOTE — DOWNTIME EVENT NOTE
The EMR was down for 6 hours on 5/23/2017.    Day Shift was responsible for completing the paper charting during this time period.     The following information was re-entered into the system by Ita Montenegro:  No information as day shift reported entering all pertinent information.    The following information will remain in the paper chart: MD diomedes Montenegro  5/23/2017

## 2017-05-23 NOTE — PLAN OF CARE
"Problem: Goal Outcome Summary  Goal: Goal Outcome Summary  Patient cooperative and pleasant during assessment, verbalized anxiety, depression and thoughts to hurt self or others. Patient quiet and going to some of groups. Patient verbalized agreement for inpatient treatment and need for help. 72 hold placed, rights read at 0905. Pt pleasantly asked to be moved to different room as current roommate doesn't  after self and has asked him numerous times to flush toilet and  and he was getting anxious and irritated. 1130- Patient moved to different room in open unit.   1250- [facility] screener her to screen.   1335- Patient report to nurse that he swallowed pieces of color pencils before and after breakfast and that he has a history of this. He opened up to this nurse and also reported that right now he doesn't feel like killing himself but in the past has attempted and gets \"impulses\" to swallow sharp objects as he gets relief and  thinks of having a low, painful death by bleeding. Patient agreed to be placed in MHICU for protection for swallowing objects. Patient allowed searching prior to entering MHICU. Provider notified at 2498. 1197- Portable ABD and lower xray 2 views ordered. Xray department called and faxed.  Problem: Depression (Adult,Obstetrics,Pediatric)  Goal: Establish/Maintain Self-Care Routine  Patient will maintain ADL routine by d/c.   Outcome: Improving  Patient performing ADLs independently this shift.   Goal: Improved/Stable Mood  Patient will verbalize a decrease in depression by d/c.   Outcome: Improving  Patient verbalized decreased in depression this shift.     Problem: Anxiety (Adult)  Goal: Reduction/Resolution  Patient will verbalize a decrease in anxiety by d/c.   Outcome: Improving  Patient states anxiety is getting better.     Problem: Suicide Risk (Adult)  Goal: Strength-Based Wellness/Recovery  Patient will be able to verbalize at least 2 coping mechanisms by " d/c.  Patient will attend at least 50% of groups daily.   Pt attending some groups this shift. Verbalized need for treatment.   Goal: Physical Safety  Patient will verbalize no SI each shift until d/c.   Patient denies SI this shift.

## 2017-05-23 NOTE — PLAN OF CARE
Problem: Goal Outcome Summary  Goal: Goal Outcome Summary  Pt appeared to be sleeping throughout shift, normal respirations and position changes noted.

## 2017-05-23 NOTE — PLAN OF CARE
Problem: Discharge Planning  Goal: Discharge Planning (Adult, OB, Behavioral, Peds)  Outcome: No Change  Phu Prather came to screen patient and stated that [facility] will be pursuing commitment.

## 2017-05-23 NOTE — PLAN OF CARE
Problem: Goal Outcome Summary  Goal: Goal Outcome Summary  Outcome: No Change  Patient has been escorted to x-ray by [address] staff and has been cooperative. He currently is sitting on the floor and is watching tv. He says he has the feeling that there is a bunch of rocks in his stomach. He does not rate pain. He is told that the preliminary xrays show no foreign bodies but he says that they are there and he wouldn't lie. Patient is able to eat and drink. Skin is ok with only scars and healed wounds. Denies current suicdial and injurious behavior but he admits to anxiety and depression. He has had no falls. Patient requested and given Trazodone 100 mg po for sleep at 2029.    Problem: Depression (Adult,Obstetrics,Pediatric)  Goal: Establish/Maintain Self-Care Routine  Patient will maintain ADL routine by d/c.   Outcome: No Change  Patient has been taking care of his care needs this shift.  Goal: Improved/Stable Mood  Patient will verbalize a decrease in depression by d/c.   Outcome: No Change  Patient continues to endorse depression.    Problem: Anxiety (Adult)  Goal: Reduction/Resolution  Patient will verbalize a decrease in anxiety by d/c.   Outcome: No Change  Patient continues to endorse anxiety and depression but denies suicidal thoughts.    Problem: Suicide Risk (Adult)  Goal: Strength-Based Wellness/Recovery  Patient will be able to verbalize at least 2 coping mechanisms by d/c.  Patient will attend at least 50% of groups daily.   Patient states he currently has no urge to hurt himself and states he will discuss thoughts with staff if he does. He isn't attending groups now due to a no wean order.  Goal: Physical Safety  Patient will verbalize no SI each shift until d/c.   Outcome: No Change  Patient denies any current suicidal thoughts.    Problem: Additional Goals: Nursing Focus  Goal: 1. Patient Goal: Nursing Focus  5/23/17 No weaning today. Treatment team to assess daily.   Outcome: No  Change  Patient continues to not wean on this shift.

## 2017-05-23 NOTE — PLAN OF CARE
Face to face end of shift report received from Amee ECKERT. Rounding completed. Patient observed in bed.     Dottie Mann  5/22/2017  11:49 PM

## 2017-05-23 NOTE — PLAN OF CARE
Face to face end of shift report received from TOMEKA Sinclair. Rounding completed. Patient observed in dayroom.     JÚNIOR IBANEZ  5/23/2017  8:01 AM

## 2017-05-23 NOTE — PLAN OF CARE
Face to face end of shift report received from Mary FIGUEROA RN. Rounding completed. Patient observed. Up in MHICU and also escorted to xray and he was cooperative.    Amee Alcaraz  5/23/2017  4:26 PM

## 2017-05-24 PROCEDURE — 25000132 ZZH RX MED GY IP 250 OP 250 PS 637: Performed by: NURSE PRACTITIONER

## 2017-05-24 PROCEDURE — 99231 SBSQ HOSP IP/OBS SF/LOW 25: CPT | Performed by: NURSE PRACTITIONER

## 2017-05-24 PROCEDURE — 12400000 ZZH R&B MH

## 2017-05-24 PROCEDURE — 27210995 ZZH RX 272: Performed by: NURSE PRACTITIONER

## 2017-05-24 RX ADMIN — HALOPERIDOL 5 MG: 5 TABLET ORAL at 12:01

## 2017-05-24 RX ADMIN — CARBAMIDE PEROXIDE 6.5% 3 DROP: 6.5 LIQUID AURICULAR (OTIC) at 20:07

## 2017-05-24 RX ADMIN — HALOPERIDOL 5 MG: 5 TABLET ORAL at 20:09

## 2017-05-24 RX ADMIN — DEXTRAN 70, AND HYPROMELLOSE 2910 1 DROP: 1; 3 SOLUTION/ DROPS OPHTHALMIC at 08:24

## 2017-05-24 RX ADMIN — HALOPERIDOL 5 MG: 5 TABLET ORAL at 18:28

## 2017-05-24 RX ADMIN — Medication: at 09:32

## 2017-05-24 RX ADMIN — DEXTRAN 70, AND HYPROMELLOSE 2910 1 DROP: 1; 3 SOLUTION/ DROPS OPHTHALMIC at 20:07

## 2017-05-24 RX ADMIN — CARBAMIDE PEROXIDE 6.5% 3 DROP: 6.5 LIQUID AURICULAR (OTIC) at 08:24

## 2017-05-24 RX ADMIN — MIRTAZAPINE 15 MG: 15 TABLET, FILM COATED ORAL at 20:06

## 2017-05-24 RX ADMIN — ACETAMINOPHEN 650 MG: 325 TABLET, FILM COATED ORAL at 06:30

## 2017-05-24 RX ADMIN — DEXTRAN 70, AND HYPROMELLOSE 2910 1 DROP: 1; 3 SOLUTION/ DROPS OPHTHALMIC at 13:08

## 2017-05-24 RX ADMIN — NICOTINE 1 PATCH: 21 PATCH, EXTENDED RELEASE TRANSDERMAL at 08:24

## 2017-05-24 RX ADMIN — NICOTINE POLACRILEX 2 MG: 2 GUM, CHEWING ORAL at 14:12

## 2017-05-24 RX ADMIN — TRAZODONE HYDROCHLORIDE 100 MG: 100 TABLET ORAL at 20:12

## 2017-05-24 ASSESSMENT — ACTIVITIES OF DAILY LIVING (ADL)
LAUNDRY: UNABLE TO COMPLETE
GROOMING: INDEPENDENT
DRESS: SCRUBS (BEHAVIORAL HEALTH);INDEPENDENT
LAUNDRY: UNABLE TO COMPLETE
ORAL_HYGIENE: INDEPENDENT
ORAL_HYGIENE: INDEPENDENT
GROOMING: INDEPENDENT
DRESS: INDEPENDENT;SCRUBS (BEHAVIORAL HEALTH)

## 2017-05-24 NOTE — PLAN OF CARE
Problem: Discharge Planning  Goal: Discharge Planning (Adult, OB, Behavioral, Peds)  Outcome: Improving  Completed patient's assessment today - he engaged well - reports that he swallows things to get a release from his super high anxiety and not as a self harm attempt.  He is open to talking to the provider about Alpha Stim - he also states he has used sensory things in the past in treatment and it was helpful - will have the provider discuss OT with him also.  Reviewed court questions - he did recently have a rule 25 so will try to get a copy of this to speed things up - he also states he needs to go someplace MI/CD as he keeps trying CD and it is not enough and that he needs to go someplace locked so when his anxiety gets high, he doesn't leave and go and use to release the anxiety.

## 2017-05-24 NOTE — PLAN OF CARE
"Problem: Goal Outcome Summary  Goal: Goal Outcome Summary  Pt has been calm and cooperative. Says he is bored. Gave him a book out of his belongings. Pt denies SI, HI, and hallucinations. Pt denied anxiety and depression this morning. Around lunch time pt reports feeling like he was getting worked up. Pt requested and was given PRN Haldol 5 mg po at 1201, says he needs to calm down. Pt says he ate colored pencils yesterday. Says he broke the end off because they were to long. Pt says this is just an urge he gets but he is not trying to harm himself. No pain is noted. Pt would like to use alpha Stim to help with anxiety as he doesn't want to be all med based. Left sticky note for provider.   1325: Pt resting in bed, says he feels \"much better\"    Problem: Depression (Adult,Obstetrics,Pediatric)  Goal: Establish/Maintain Self-Care Routine  Patient will maintain ADL routine by d/c.   Outcome: Therapy, progress toward functional goals is gradual  Pt is independent with ADLs  Goal: Improved/Stable Mood  Patient will verbalize a decrease in depression by d/c.   Outcome: Therapy, progress toward functional goals is gradual  Pt denies depression.     Problem: Anxiety (Adult)  Goal: Reduction/Resolution  Patient will verbalize a decrease in anxiety by d/c.   Outcome: No Change  Pt reports feeling like he was getting worked up. Requested and given haldol 5 mg at 1201 po    Problem: Suicide Risk (Adult)  Goal: Strength-Based Wellness/Recovery  Patient will be able to verbalize at least 2 coping mechanisms by d/c.  Patient will attend at least 50% of groups daily.   Outcome: No Change  Pt unable to attend group at this time as he is a no wean  Goal: Physical Safety  Patient will verbalize no SI each shift until d/c.   Outcome: Therapy, progress toward functional goals is gradual  Pt denies SI    Problem: Additional Goals: Nursing Focus  Goal: 1. Patient Goal: Nursing Focus  5/24/17 No weaning today. Treatment team to assess " daily.   Outcome: No Change  Pt did not wean.

## 2017-05-24 NOTE — PLAN OF CARE
Face to face end of shift report received from TOMEKA Jean Baptiste. Rounding completed. Patient observed.     Liliana Cooper  5/24/2017  7:48 AM

## 2017-05-24 NOTE — PLAN OF CARE
Problem: Discharge Planning  Goal: Discharge Planning (Adult, OB, Behavioral, Peds)  Outcome: No Change  Sent JUSTIN to [facility] for Rule 25.

## 2017-05-24 NOTE — PROGRESS NOTES
[facility]  Psychiatric Progress Note      Impression:   Janusz Escalera is a 46 year old male who presented via [facility] ED with reports of 2 suicide attempts the day of admission. Apparently attempted to hang himself both times while in the woods. Notes indicate that he blacked out after the first attempt and woke with a bloody nose, freed himself and called his sister to bring to the ED. Second attempt details are unclear. Recent Meth use. History of SIB by swallowing foreign objects. CT of neck and xray of abdomen completed with no abnormalities. Sister reported that he will often voluntarily admit self to a facility and then leave. He was placed on a hold.     Doing better today. Discussed obsessive compulsive feelings related to consumption of inedible and potentially dangerous objects. He states that after he does it, he feels a sense of relief, but eventually this turns into guilt and anger at himself. He is unsure why he does this. Preliminary xray report does not indicate the presence of any foreign object. Discussed utilizing PRN Haldol when he has these feelings to see if that helps or not. He agreed to do this. If it does not help, will review other options. Currently in an MHICU bed. Slept well last night. Denies depression, current SI/HI, thoughts of self-harm or psychosis.          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          Medications:     Prescription Medications as of 5/24/2017             HALOPERIDOL  "PO Take 5 mg by mouth At Bedtime    Mirtazapine (REMERON PO) Take 30 mg by mouth At Bedtime    TRAZODONE HCL PO Take 100 mg by mouth At Bedtime      Facility Administered Medications as of 5/24/2017             carbamide peroxide (DEBROX) 6.5 % otic solution 3 drop Place 3 drops Into the left ear 2 times daily    traZODone (DESYREL) tablet 100 mg Take 1 tablet (100 mg) by mouth nightly as needed for sleep    hydrOXYzine (ATARAX) tablet 25-50 mg Take 1-2 tablets (25-50 mg) by mouth every 4 hours as needed for anxiety    acetaminophen (TYLENOL) tablet 650 mg Take 2 tablets (650 mg) by mouth every 4 hours as needed for mild pain    alum & mag hydroxide-simethicone (MYLANTA ES/MAALOX  ES) suspension 30 mL Take 30 mLs by mouth every 4 hours as needed for indigestion    magnesium hydroxide (MILK OF MAGNESIA) suspension 30 mL Take 30 mLs by mouth nightly as needed for constipation    bisacodyl (DULCOLAX) Suppository 10 mg Place 1 suppository (10 mg) rectally daily as needed for constipation    OLANZapine (zyPREXA) tablet 10 mg Take 1 tablet (10 mg) by mouth every 2 hours as needed for agitation (associated with psychosis or rufus)    Linked Group 1:  \"Or\" Linked Group Details     OLANZapine (zyPREXA) injection 10 mg Inject 10 mg into the muscle every 2 hours as needed for agitation (associated with psychosis or rufus)    Linked Group 1:  \"Or\" Linked Group Details     nicotine polacrilex (NICORETTE) gum 2-4 mg Place 1-2 each (2-4 mg) inside cheek every hour as needed for other (nicotine withdrawal symptoms)    hypromellose-dextran (ARTIFICAL TEARS) ophthalmic solution 1 drop Place 1 drop into both eyes 3 times daily    eye wash (BSS PLUS) ophthalmic solution 15 mL Place 15 mLs into both eyes 2 times daily as needed (contact storage)    haloperidol (HALDOL) tablet 5 mg Take 1 tablet (5 mg) by mouth At Bedtime    mirtazapine (REMERON) tablet 15 mg Take 1 tablet (15 mg) by mouth At Bedtime    haloperidol (HALDOL) tablet 5 mg " "Take 1 tablet (5 mg) by mouth 3 times daily as needed for agitation    nicotine Patch in Place Place onto the skin every 8 hours    nicotine patch REMOVAL Place onto the skin daily    nicotine (NICODERM CQ) 21 MG/24HR 24 hr patch 1 patch Place 1 patch onto the skin daily    skin closure adhesive (DERMABOND PEN) pen Apply topically daily    cloNIDine (CATAPRES) tablet 0.1 mg Take 1 tablet (0.1 mg) by mouth 2 times daily as needed (anxiety / methamphetamine withdrawal symptoms)    bacitracin ointment Apply topically 2 times daily as needed for wound care        10 point ROS positive for blisters between toes - remain intact, and abraised area left external nostril.       Allergies:     Allergies   Allergen Reactions     Cashews [Nuts] Swelling     Tongue, throat swelling            Psychiatric Examination:   /67  Pulse 69  Temp 98.6  F (37  C) (Tympanic)  Resp 20  Ht 1.803 m (5' 11\")  Wt 80.6 kg (177 lb 11.1 oz)  SpO2 98%  BMI 24.78 kg/m2  Weight is 177 lbs 11.05 oz  Body mass index is 24.78 kg/(m^2).    Appearance:  awake, alert, adequately groomed and dressed in hospital scrubs  Attitude:  cooperative  Eye Contact:  fair  Mood:  anxious and sad   Affect:  mood congruent  Speech:  clear, coherent  Psychomotor Behavior:  no evidence of tardive dyskinesia, dystonia, or tics  Thought Process:  logical, linear and goal oriented  Associations:  no loose associations  Thought Content:  no evidence of suicidal ideation or homicidal ideation and no evidence of psychotic thought  Insight:  good  Judgment:  Very impulsive  Oriented to:  time, person, and place  Attention Span and Concentration:  intact  Recent and Remote Memory:  intact  Fund of Knowledge: appropriate  Muscle Strength and Tone: normal  Gait and Station: Normal               Labs:     Results for orders placed or performed during the hospital encounter of 05/21/17   XR Abdomen Series    Narrative    PA VIEW CHEST, ABDOMEN AND PELVIS    CLINICAL " HISTORY:  Swallowed foreign object.    FINDINGS:  Cardiac silhouette and pulmonary vasculature are within  normal limits. The lungs are clear.  Bowel gas pattern is  unremarkable. There is no evidence of radiodense foreign body.    IMPRESSION:  1.  CLEAR LUNGS.  NO ACUTE ABNORMALITY IN THE ABDOMEN/PELVIS.    2.  NO DISTINCT EVIDENCE OF RADIODENSE FOREIGN BODY.  Exam Date: May 23, 2017 03:58:17 PM  Author: LAURA YUN  This report is preliminary and transcribed       No new labs today.            Plan:     Continue Haldol 5mg at bed and tid prn for psychosis. Encourage PRN Haldol for increased anxiety with obsessive thoughts that result in compulsive behaviors.  Continue Remeron 15mg at bed.  Petition for commitment filed  MHICU bed for increased observation

## 2017-05-24 NOTE — PLAN OF CARE
"Problem: Goal Outcome Summary  Goal: Goal Outcome Summary  Pt has been sleeping on and off through out the shift. States he is \"just killing time\". Pt requested and received Haldol 5mg at 1830, \"to calm down\". Pt does endorse anxiety at times. He denies SI, HI, hallucinations, depression or pain. Pt pleasant and cooperative with this writer.  2015- Pt requested and received Trazadone for sleep.  Compliant with scheduled medications.  Problem: Depression (Adult,Obstetrics,Pediatric)  Goal: Establish/Maintain Self-Care Routine  Patient will maintain ADL routine by d/c.   Pt independent with ADL's.  Goal: Improved/Stable Mood  Patient will verbalize a decrease in depression by d/c.   Pt denies depression at this time.    Problem: Anxiety (Adult)  Goal: Reduction/Resolution  Patient will verbalize a decrease in anxiety by d/c.   Pt states the anxiety comes and goes .     Problem: Suicide Risk (Adult)  Goal: Strength-Based Wellness/Recovery  Patient will be able to verbalize at least 2 coping mechanisms by d/c.  Patient will attend at least 50% of groups daily.   Pt in MHICU at this time with no weaning.  Goal: Physical Safety  Patient will verbalize no SI each shift until d/c.   Pt denies SI at this time.    Problem: Additional Goals: Nursing Focus  Goal: 1. Patient Goal: Nursing Focus  5/24/17 No weaning today. Treatment team to assess daily.   No weaning this shift.      "

## 2017-05-24 NOTE — PLAN OF CARE
Face to face end of shift report received from Ronan SMITH RN. Rounding completed. Patient observed in bed in MHICU.    Leslieraymond Meza  5/24/2017  4:35 PM

## 2017-05-24 NOTE — PLAN OF CARE
Treatment team care conference held 05/24/17    Attendees:Nursing, , Occupational Therapy, NP, Manager/Supervisor and Recreational Therapy    MD/NP: Patient has been admitted 4 days and still meets hospital inpatient criteria.    RN/Charge Nurse:     Restraint/seclusion, disruptive unsafe behavior: Yes: Pt reported to staff yesterday (05/23/17) that he swallowed colored pencils.    Symptoms progress (SI and SIB urges, AH, etc.): Patient's symptoms have exacerbated--with reported episode of ingesting colored pencils. [facility]    Sleep, nutrition, significant weight change: no recent changes. Pt sleeping well. Pt eating 100% of meals.    PRN use/results, side effects, labs, medical issues needing attention: Yes: Pt had PRN Haldol at noon today; pt using PRN Trazodone at HS occasionally (05/21 and 05/23); pt had PRN Clonidine on 05/21; pt received PRN APAP this morning    Review DC checklist is discharge occurring within 48 hours: No    Review follow up medical appointments needed: No  OT and Krystyna B: Gives report including:    Group attendance: N/A-[facility] No weaning at this time.    Madeleine observation: N/A    Interactions with others: N/A    Observed symptoms: N/A  Social work team: Provide updates including:    Legal status (need to sign in, petition for commitment, etc.): Petition for civil commitment has been filed    Collateral history from family, , etc.: N/A    Progress toward ideal aftercare plan: No change    Follow up medical appointments made and added to AVS: No    Obstacles to Discharge: Substance use/abuse; homeless  Provider (MD/NP): Provides updates on the treatment plan:    Symptoms that must improve before discharge: SIB; swallowing foreign objects    Med changes targeting these symptoms: PRN Haldol    Aftercare needs identified (Day Tx, CD Tx, IRTS, etc.): MI/CD Treatment ([facility])     Expected discharge date:  Unknown--petition for civil commitment filed    Have we entered into avoidable days: No    Goal(s) met: N/A    Goal(s) to continue: Pt will not ingest/report that he ingested any foreign objects    New Goal(s): Pt will wean to the open unit without incident.

## 2017-05-24 NOTE — PLAN OF CARE
Problem: Goal Outcome Summary  Goal: Goal Outcome Summary  Outcome: Improving  Patient appeared to sleep this shift.  Regular position noted throughout the night. Patient complained of headache at 0625; offered and accepted Tylenol 650 mg po.

## 2017-05-25 PROCEDURE — 12400000 ZZH R&B MH

## 2017-05-25 PROCEDURE — 25000132 ZZH RX MED GY IP 250 OP 250 PS 637: Performed by: NURSE PRACTITIONER

## 2017-05-25 PROCEDURE — 97165 OT EVAL LOW COMPLEX 30 MIN: CPT | Mod: GO

## 2017-05-25 PROCEDURE — 27210995 ZZH RX 272: Performed by: NURSE PRACTITIONER

## 2017-05-25 PROCEDURE — 40000133 ZZH STATISTIC OT WARD VISIT

## 2017-05-25 RX ADMIN — CARBAMIDE PEROXIDE 6.5% 3 DROP: 6.5 LIQUID AURICULAR (OTIC) at 20:31

## 2017-05-25 RX ADMIN — CARBAMIDE PEROXIDE 6.5% 3 DROP: 6.5 LIQUID AURICULAR (OTIC) at 08:29

## 2017-05-25 RX ADMIN — DEXTRAN 70, AND HYPROMELLOSE 2910 1 DROP: 1; 3 SOLUTION/ DROPS OPHTHALMIC at 13:21

## 2017-05-25 RX ADMIN — NICOTINE 1 PATCH: 21 PATCH, EXTENDED RELEASE TRANSDERMAL at 08:29

## 2017-05-25 RX ADMIN — MIRTAZAPINE 15 MG: 15 TABLET, FILM COATED ORAL at 20:31

## 2017-05-25 RX ADMIN — DEXTRAN 70, AND HYPROMELLOSE 2910 1 DROP: 1; 3 SOLUTION/ DROPS OPHTHALMIC at 20:31

## 2017-05-25 RX ADMIN — TRAZODONE HYDROCHLORIDE 100 MG: 100 TABLET ORAL at 20:37

## 2017-05-25 RX ADMIN — DEXTRAN 70, AND HYPROMELLOSE 2910 1 DROP: 1; 3 SOLUTION/ DROPS OPHTHALMIC at 08:29

## 2017-05-25 RX ADMIN — NICOTINE POLACRILEX 2 MG: 2 GUM, CHEWING ORAL at 18:56

## 2017-05-25 RX ADMIN — Medication: at 10:18

## 2017-05-25 RX ADMIN — HALOPERIDOL 5 MG: 5 TABLET ORAL at 20:31

## 2017-05-25 ASSESSMENT — ACTIVITIES OF DAILY LIVING (ADL)
GROOMING: INDEPENDENT
DRESS: INDEPENDENT;SCRUBS (BEHAVIORAL HEALTH)
GROOMING: INDEPENDENT
ORAL_HYGIENE: INDEPENDENT
LAUNDRY: UNABLE TO COMPLETE
DRESS: INDEPENDENT
ORAL_HYGIENE: INDEPENDENT
LAUNDRY: UNABLE TO COMPLETE

## 2017-05-25 NOTE — PLAN OF CARE
Face to face end of shift report received from Liliana ECKERT. Rounding completed. Patient observed in dayroom and introduced to nursing for the shift.    Manuel Guillen  5/25/2017  3:46 PM

## 2017-05-25 NOTE — PLAN OF CARE
Face to face end of shift report received from Liliana TOBIN RN. Rounding completed. Patient observed in group.     Brionna Correa  5/25/2017  3:30 PM

## 2017-05-25 NOTE — PLAN OF CARE
Face to face end of shift report received from TOMEKA Sinclair. Rounding completed. Patient observed in bed, sleeping.     Liliana Cooper  5/25/2017  7:30 AM
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
## 2017-05-25 NOTE — PLAN OF CARE
Problem: Discharge Planning  Goal: Discharge Planning (Adult, OB, Behavioral, Peds)  Pt has his confinement court hearing on Tuesday, May 30th at 1pm and his commitment hearing on June 8th @ 3:30 here in our courtroom.  Papers were served to pt.

## 2017-05-25 NOTE — PLAN OF CARE
Face to face end of shift report received from Leslie ECKERT. Rounding completed. Patient observed in bed.     Dottie Mann  5/24/2017  11:36 PM

## 2017-05-25 NOTE — PLAN OF CARE
"Problem: Goal Outcome Summary  Goal: Goal Outcome Summary  Pt denies SI, HI, and hallucinations. Pt weaned out to the open unit and was appropriate.  He denies anxiety and depression. Pt had complaints of feeling dizzy this morning vitals signs were WNL, encouraged pt to drink plenty of water. Pt did report feeling better, stating, \"i've been drinking a lot of coffee, think I just needed some water.\" Pt is calm and cooperative with nursing assessment. Pt is complaint with prescribed medication. Denies pain. Pt did attend some groups.     Problem: Depression (Adult,Obstetrics,Pediatric)  Goal: Establish/Maintain Self-Care Routine  Patient will maintain ADL routine by d/c.   Outcome: Therapy, progress toward functional goals is gradual  Pt is independent with ADLs  Goal: Improved/Stable Mood  Patient will verbalize a decrease in depression by d/c.   Outcome: Therapy, progress toward functional goals is gradual  Pt denies depression    Problem: Anxiety (Adult)  Goal: Reduction/Resolution  Patient will verbalize a decrease in anxiety by d/c.   Outcome: Therapy, progress toward functional goals is gradual  Pt denies anxiety    Problem: Suicide Risk (Adult)  Goal: Strength-Based Wellness/Recovery  Patient will be able to verbalize at least 2 coping mechanisms by d/c.  Patient will attend at least 50% of groups daily.   Outcome: Therapy, progress toward functional goals is gradual  Pt did attend some group this shift  Goal: Physical Safety  Patient will verbalize no SI each shift until d/c.   Outcome: Therapy, progress toward functional goals is gradual  Pt denies SI    Problem: Additional Goals: Nursing Focus  Goal: 1. Patient Goal: Nursing Focus  5/25/17 Pt can wean as long as behaviors remain appropriate and is complaint with safety checks.   Outcome: Therapy, progress toward functional goals is gradual  Pt did wean this shift and behavior was appropriate.       "

## 2017-05-26 PROCEDURE — 97535 SELF CARE MNGMENT TRAINING: CPT | Mod: GO

## 2017-05-26 PROCEDURE — 25000132 ZZH RX MED GY IP 250 OP 250 PS 637: Performed by: NURSE PRACTITIONER

## 2017-05-26 PROCEDURE — 40000133 ZZH STATISTIC OT WARD VISIT

## 2017-05-26 PROCEDURE — 99232 SBSQ HOSP IP/OBS MODERATE 35: CPT | Performed by: NURSE PRACTITIONER

## 2017-05-26 PROCEDURE — 12400000 ZZH R&B MH

## 2017-05-26 RX ADMIN — TRAZODONE HYDROCHLORIDE 100 MG: 100 TABLET ORAL at 20:29

## 2017-05-26 RX ADMIN — NICOTINE 1 PATCH: 21 PATCH, EXTENDED RELEASE TRANSDERMAL at 08:17

## 2017-05-26 RX ADMIN — DEXTRAN 70, AND HYPROMELLOSE 2910 1 DROP: 1; 3 SOLUTION/ DROPS OPHTHALMIC at 20:29

## 2017-05-26 RX ADMIN — DEXTRAN 70, AND HYPROMELLOSE 2910 1 DROP: 1; 3 SOLUTION/ DROPS OPHTHALMIC at 08:16

## 2017-05-26 RX ADMIN — DEXTRAN 70, AND HYPROMELLOSE 2910 1 DROP: 1; 3 SOLUTION/ DROPS OPHTHALMIC at 13:42

## 2017-05-26 RX ADMIN — Medication 25 MG: at 08:17

## 2017-05-26 RX ADMIN — HYDROXYZINE HYDROCHLORIDE 50 MG: 25 TABLET ORAL at 18:44

## 2017-05-26 RX ADMIN — Medication 25 MG: at 20:29

## 2017-05-26 RX ADMIN — HALOPERIDOL 5 MG: 5 TABLET ORAL at 20:29

## 2017-05-26 RX ADMIN — MIRTAZAPINE 15 MG: 15 TABLET, FILM COATED ORAL at 20:29

## 2017-05-26 ASSESSMENT — ACTIVITIES OF DAILY LIVING (ADL)
DRESS: INDEPENDENT
DRESS: INDEPENDENT;SCRUBS (BEHAVIORAL HEALTH)
GROOMING: INDEPENDENT
GROOMING: INDEPENDENT
ORAL_HYGIENE: INDEPENDENT
ORAL_HYGIENE: INDEPENDENT
LAUNDRY: UNABLE TO COMPLETE
LAUNDRY: UNABLE TO COMPLETE

## 2017-05-26 NOTE — PLAN OF CARE
Problem: Goal Outcome Summary  Goal: Goal Outcome Summary  Outcome: No Change  Pt. Denies HI, SI, depression, anxiety, pain, and hallucinations. Pt. Agrees to update staff to thoughts feelings of wanting to harm self or others. Pt. weaning at will to open unit. Compliant with checks. Pt,. cooperative with nursing assessment and medications. Pt. Eating 100% of dinner in room. Pt. denies issues with bowel and bladder. Pt. given PRN trazadone @ 2037 per Pt. request, with effective results.     Problem: Depression (Adult,Obstetrics,Pediatric)  Goal: Establish/Maintain Self-Care Routine  Patient will maintain ADL routine by d/c.   Outcome: No Change  ADLs maintained independently.  Goal: Improved/Stable Mood  Patient will verbalize a decrease in depression by d/c.   Outcome: No Change  See goal summary.     Problem: Anxiety (Adult)  Goal: Reduction/Resolution  Patient will verbalize a decrease in anxiety by d/c.   Outcome: No Change  See goal summary.    Problem: Suicide Risk (Adult)  Goal: Physical Safety  Patient will verbalize no SI each shift until d/c.   Outcome: No Change  Denies SI.     Problem: Additional Goals: Nursing Focus  Goal: 1. Patient Goal: Nursing Focus  5/25/17 Pt can wean as long as behaviors remain appropriate and is complaint with safety checks. No weaning at meal times.   Outcome: No Change  Se goal summary.

## 2017-05-26 NOTE — PLAN OF CARE
Face to face end of shift report received from Isidro TOBIN RN. Rounding completed. Patient observed in group room.     Kim Shepherd  5/26/2017  4:22 PM

## 2017-05-26 NOTE — PLAN OF CARE
Face to face end of shift report received from TOMEKA Sinclair. Rounding completed. Patient observed in bed, sleeping.     Liliana Cooper  5/26/2017  7:43 AM

## 2017-05-26 NOTE — PROGRESS NOTES
Follow up.  Current weight is 178# with ibwr of 155-189#.   Regular diet with double portions is ordered and pt is consuming 100% meals offered.  Follow weights weekly to monitor for weight gain with double portions.  RD will continue to follow for weight change.

## 2017-05-26 NOTE — PLAN OF CARE
Problem: Discharge Planning  Goal: Discharge Planning (Adult, OB, Behavioral, Peds)  Outcome: No Change  Received Rule 25 from [facility] in [place]. Sent copy to [facility] and [facility]. Filled out CARE form and sent, h&p, notes, labs, and facesheet to [facility].

## 2017-05-26 NOTE — PLAN OF CARE
Problem: Goal Outcome Summary  Goal: Goal Outcome Summary  Pt denies SI, HI, and hallucinations. Pt is pleasant and cooperative with nursing assessment. He has been weaning out to the open unit and attending groups most of this shift. Pt is compliant with checks upon return to [facility].  Pt says anxiety has stayed pretty low today. Says he used the weighted collar in group and says that helped with anxiety a lot. Pt denies depression. No pain is noted. Affect remains flat. Pt showered this am.     Problem: Depression (Adult,Obstetrics,Pediatric)  Goal: Establish/Maintain Self-Care Routine  Patient will maintain ADL routine by d/c.   Outcome: Therapy, progress toward functional goals is gradual  Pt is independent with ADLs  Goal: Improved/Stable Mood  Patient will verbalize a decrease in depression by d/c.   Outcome: Therapy, progress toward functional goals is gradual  Pt denies depression    Problem: Anxiety (Adult)  Goal: Reduction/Resolution  Patient will verbalize a decrease in anxiety by d/c.   Outcome: Therapy, progress towards functional goals is fair  Says anxiety is low today    Problem: Suicide Risk (Adult)  Goal: Strength-Based Wellness/Recovery  Patient will be able to verbalize at least 2 coping mechanisms by d/c.  Patient will attend at least 50% of groups daily.   Outcome: Therapy, progress toward functional goals is gradual  Pt says weighted collar and alpha stim seem to help him calm down.  Pt has been attending groups this shift.   Goal: Physical Safety  Patient will verbalize no SI each shift until d/c.   Outcome: Therapy, progress toward functional goals is gradual  Denies SI    Problem: Additional Goals: Nursing Focus  Goal: 1. Patient Goal: Nursing Focus  5/26/17 Pt can wean as long as behaviors remain appropriate and is compliant with safety checks.   Outcome: Therapy, progress toward functional goals is gradual  Pt has weaned to open unit most of this shift.   Behaviors have remained appropriate  and he is compliant with safety checks upon return to [facility]

## 2017-05-26 NOTE — PROGRESS NOTES
[facility]  Psychiatric Progress Note      Impression:   Janusz Escalera is a 46 year old male who presented via [facility] ED with reports of 2 suicide attempts the day of admission. Apparently attempted to hang himself both times while in the woods. Notes indicate that he blacked out after the first attempt and woke with a bloody nose, freed himself and called his sister to bring to the ED. Second attempt details are unclear. Recent Meth use. History of SIB by swallowing foreign objects. CT of neck and xray of abdomen completed with no abnormalities. Sister reported that he will often voluntarily admit self to a facility and then leave. He was placed on a hold.     Has been weaning onto open unit and is appropriate. Denies further urges to consume inedibles. Discussed use of Naltrexone for this obsessive compulsive type of behavior and he is agreeable. Consulted with Dr. Perez secondary to Hep C and slight elevation in AST/ALT at 132/80. He indicates that for less than 5 times the normal rate, just monitor, so this is what we will do. Has been going to groups. Had  Dizzy episode yesterday - thinks it was related to too much coffee and resolved after he drank some water. Confinement court scheduled for 5/30 at 1pm and commitment 6/8 at 3:30pm. Denies depression, SI/HI, anxiety, or psychosis. Has been sleeping fairly well but did take Trazodone last night.          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          Medications:  "    Prescription Medications as of 5/26/2017             HALOPERIDOL PO Take 5 mg by mouth At Bedtime    Mirtazapine (REMERON PO) Take 30 mg by mouth At Bedtime    TRAZODONE HCL PO Take 100 mg by mouth At Bedtime      Facility Administered Medications as of 5/26/2017             naltrexone (DEPADE;REVIA) tablet 25 mg Take 1 half-tab (25 mg) by mouth 2 times daily    carbamide peroxide (DEBROX) 6.5 % otic solution 3 drop Place 3 drops Into the left ear 2 times daily    traZODone (DESYREL) tablet 100 mg Take 1 tablet (100 mg) by mouth nightly as needed for sleep    hydrOXYzine (ATARAX) tablet 25-50 mg Take 1-2 tablets (25-50 mg) by mouth every 4 hours as needed for anxiety    acetaminophen (TYLENOL) tablet 650 mg Take 2 tablets (650 mg) by mouth every 4 hours as needed for mild pain    alum & mag hydroxide-simethicone (MYLANTA ES/MAALOX  ES) suspension 30 mL Take 30 mLs by mouth every 4 hours as needed for indigestion    magnesium hydroxide (MILK OF MAGNESIA) suspension 30 mL Take 30 mLs by mouth nightly as needed for constipation    bisacodyl (DULCOLAX) Suppository 10 mg Place 1 suppository (10 mg) rectally daily as needed for constipation    OLANZapine (zyPREXA) tablet 10 mg Take 1 tablet (10 mg) by mouth every 2 hours as needed for agitation (associated with psychosis or rufus)    Linked Group 1:  \"Or\" Linked Group Details     OLANZapine (zyPREXA) injection 10 mg Inject 10 mg into the muscle every 2 hours as needed for agitation (associated with psychosis or rufus)    Linked Group 1:  \"Or\" Linked Group Details     nicotine polacrilex (NICORETTE) gum 2-4 mg Place 1-2 each (2-4 mg) inside cheek every hour as needed for other (nicotine withdrawal symptoms)    hypromellose-dextran (ARTIFICAL TEARS) ophthalmic solution 1 drop Place 1 drop into both eyes 3 times daily    eye wash (BSS PLUS) ophthalmic solution 15 mL Place 15 mLs into both eyes 2 times daily as needed (contact storage)    haloperidol (HALDOL) tablet 5 mg " "Take 1 tablet (5 mg) by mouth At Bedtime    mirtazapine (REMERON) tablet 15 mg Take 1 tablet (15 mg) by mouth At Bedtime    haloperidol (HALDOL) tablet 5 mg Take 1 tablet (5 mg) by mouth 3 times daily as needed for agitation    nicotine Patch in Place Place onto the skin every 8 hours    nicotine patch REMOVAL Place onto the skin daily    nicotine (NICODERM CQ) 21 MG/24HR 24 hr patch 1 patch Place 1 patch onto the skin daily    skin closure adhesive (DERMABOND PEN) pen Apply topically daily    cloNIDine (CATAPRES) tablet 0.1 mg Take 1 tablet (0.1 mg) by mouth 2 times daily as needed (anxiety / methamphetamine withdrawal symptoms)    bacitracin ointment Apply topically 2 times daily as needed for wound care        10 point ROS positive for blisters between toes - remain intact, and abraised area left external nostril.       Allergies:     Allergies   Allergen Reactions     Cashews [Nuts] Swelling     Tongue, throat swelling            Psychiatric Examination:   /82  Pulse 84  Temp 98.9  F (37.2  C) (Tympanic)  Resp 18  Ht 1.803 m (5' 11\")  Wt 80.6 kg (177 lb 11.1 oz)  SpO2 97%  BMI 24.78 kg/m2  Weight is 177 lbs 11.05 oz  Body mass index is 24.78 kg/(m^2).    Appearance:  awake, alert, adequately groomed and dressed in hospital scrubs  Attitude:  cooperative  Eye Contact:  good  Mood:  better  Affect:  appropriate and in normal range  Speech:  clear, coherent  Psychomotor Behavior:  no evidence of tardive dyskinesia, dystonia, or tics  Thought Process:  logical, linear and goal oriented  Associations:  no loose associations  Thought Content:  no evidence of suicidal ideation or homicidal ideation and no evidence of psychotic thought  Insight:  good  Judgment:  intact but impulsive  Oriented to:  time, person, and place  Attention Span and Concentration:  intact  Recent and Remote Memory:  intact  Fund of Knowledge: appropriate  Muscle Strength and Tone: normal  Gait and Station: Normal         Labs: "     Results for orders placed or performed during the hospital encounter of 05/21/17   XR Abdomen Series    Narrative    PA VIEW CHEST, ABDOMEN AND PELVIS    CLINICAL HISTORY:  Swallowed foreign object.    FINDINGS:  Cardiac silhouette and pulmonary vasculature are within  normal limits. The lungs are clear.  Bowel gas pattern is  unremarkable. There is no evidence of radiodense foreign body.    IMPRESSION:  1.  CLEAR LUNGS.  NO ACUTE ABNORMALITY IN THE ABDOMEN/PELVIS.    2.  NO DISTINCT EVIDENCE OF RADIODENSE FOREIGN BODY.  Exam Date: May 23, 2017 03:58:17 PM  Author: LAURA YUN  This report is preliminary and transcribed       No new labs today.            Plan:     Continue Haldol 5mg at bed and tid prn for psychosis. Encourage PRN Haldol for increased anxiety with obsessive thoughts that result in compulsive behaviors.  Continue Remeron 15mg at bed.  Start Naltrexone 25mg bid.   Petition for commitment filed  MHICU bed for increased observation

## 2017-05-26 NOTE — PLAN OF CARE
Face to face end of shift report received from Brionna ECKERT. Rounding completed. Patient observed in bed.     Dottie Mann  5/25/2017  11:43 PM

## 2017-05-26 NOTE — PLAN OF CARE
Problem: Goal Outcome Summary  Goal: Goal Outcome Summary  OT treatment completed on this date. Pt. Education provided on deep pressure input and its effect for calming when experiencing symptoms of anxiety. Discussed how stretching can have a calming effect as well and how it helps to override the fight/flight response. Patient utilized weighted vest/collar during education time. Provided information on bean bag tapping and had patient practice. Patient notes that it was beneficial. Patient is interested in making a weighted animal and a bean bag for a personal sensory kit which will be placed in his belongings when it is made. Discussed with patient about the sensory box in the nurses station.

## 2017-05-27 PROCEDURE — 99232 SBSQ HOSP IP/OBS MODERATE 35: CPT | Performed by: NURSE PRACTITIONER

## 2017-05-27 PROCEDURE — 25000132 ZZH RX MED GY IP 250 OP 250 PS 637: Performed by: NURSE PRACTITIONER

## 2017-05-27 PROCEDURE — 12400000 ZZH R&B MH

## 2017-05-27 RX ADMIN — DEXTRAN 70, AND HYPROMELLOSE 2910 1 DROP: 1; 3 SOLUTION/ DROPS OPHTHALMIC at 08:27

## 2017-05-27 RX ADMIN — DEXTRAN 70, AND HYPROMELLOSE 2910 1 DROP: 1; 3 SOLUTION/ DROPS OPHTHALMIC at 13:35

## 2017-05-27 RX ADMIN — Medication 25 MG: at 21:01

## 2017-05-27 RX ADMIN — NICOTINE 1 PATCH: 21 PATCH, EXTENDED RELEASE TRANSDERMAL at 08:24

## 2017-05-27 RX ADMIN — MIRTAZAPINE 15 MG: 15 TABLET, FILM COATED ORAL at 21:01

## 2017-05-27 RX ADMIN — HALOPERIDOL 5 MG: 5 TABLET ORAL at 20:18

## 2017-05-27 RX ADMIN — ACETAMINOPHEN 650 MG: 325 TABLET, FILM COATED ORAL at 20:18

## 2017-05-27 RX ADMIN — Medication 25 MG: at 08:23

## 2017-05-27 RX ADMIN — DEXTRAN 70, AND HYPROMELLOSE 2910 1 DROP: 1; 3 SOLUTION/ DROPS OPHTHALMIC at 21:01

## 2017-05-27 ASSESSMENT — ACTIVITIES OF DAILY LIVING (ADL)
GROOMING: INDEPENDENT
ORAL_HYGIENE: INDEPENDENT
LAUNDRY: UNABLE TO COMPLETE
DRESS: INDEPENDENT

## 2017-05-27 NOTE — PROGRESS NOTES
JIN VILLANUEVA  Patient requested visit with . Patient wanted to share with the  some his life story and the difficulty he has had with drugs and also bob Hep C.  Patient shared his awareness of a spiritual care or guide in his life which saved him from suicide before coming in here.  Patient realizes that he needs to be on medications to relieve his anxiety and depression.  We talked at length about his needing patience to let the medications work to stabilize his life and then to be in rehab and set some goals to find some happiness again in his life.  Patient was very appreciative for the reinforcement of some of these ideas and wants to find his life's meaning and purpose.  We closed time in prayer.

## 2017-05-27 NOTE — PLAN OF CARE
Problem: Goal Outcome Summary  Goal: Goal Outcome Summary  Outcome: Improving  Pt has flat affect, but has denied any Mental Health concerns. Denies SI, contracts to be safe. Pt has been social at times with peers. Pt also met with the latanya this shift. Pt med compliant.    Problem: Depression (Adult,Obstetrics,Pediatric)  Goal: Establish/Maintain Self-Care Routine  Patient will maintain ADL routine by d/c.   Outcome: Improving  Pt has been maintaining ADLs  Goal: Improved/Stable Mood  Patient will verbalize a decrease in depression by d/c.   Outcome: Improving  Pt states decreased depression    Problem: Anxiety (Adult)  Goal: Reduction/Resolution  Patient will verbalize a decrease in anxiety by d/c.   Outcome: Improving  Pt has a decreased in Anxiety and has been calm    Problem: Suicide Risk (Adult)  Goal: Strength-Based Wellness/Recovery  Patient will be able to verbalize at least 2 coping mechanisms by d/c.  Patient will attend at least 50% of groups daily.   Outcome: Improving  Pt has been using distraction with music and reading today.l Pt attending some groups  Goal: Physical Safety  Patient will verbalize no SI each shift until d/c.   Outcome: Improving  Pt denies SI    Problem: Additional Goals: Nursing Focus  Goal: 1. Patient Goal: Nursing Focus  5/27/17 Pt can wean as long as behaviors remain appropriate and is compliant with safety checks.   Outcome: Improving  Pt has been weening without issue and has been safe

## 2017-05-27 NOTE — PLAN OF CARE
Face to face end of shift report received from Kim ECKERT. Rounding completed. Patient observed in bed.     Dottie Mann  5/26/2017  11:44 PM

## 2017-05-27 NOTE — PLAN OF CARE
Problem: Goal Outcome Summary  Goal: Goal Outcome Summary  Outcome: Improving  Patient calm, cooperative, and friendly with staff and other patients on the unit.  He is attending groups and states he is learning some good coping mechanisms.  He states that his depression has decreased and has no thoughts of suicide. He is hopeful and excited for the future and wants to go to treatment.  Patient shows good insight into mental health issues. Compliant with all medications.  VS WNL.     Problem: Depression (Adult,Obstetrics,Pediatric)  Goal: Improved/Stable Mood  Patient will verbalize a decrease in depression by d/c.   Outcome: Improving  Patient states that his depression is stable and tolerable.     Problem: Anxiety (Adult)  Goal: Reduction/Resolution  Patient will verbalize a decrease in anxiety by d/c.   Outcome: Improving  Patient feels he is still having a lot of anxiety.  Received 50 mg of Atarax at 1844.  States this helped relieve his anxiety.     Problem: Suicide Risk (Adult)  Goal: Physical Safety  Patient will verbalize no SI each shift until d/c.   Outcome: Improving  Patient states he is not having any SI or thoughts of self injury at this time.  Reports that he is not having any thoughts or impulses to swallow objects.  Feels safe and secure in this environment.     Problem: Additional Goals: Nursing Focus  Goal: 1. Patient Goal: Nursing Focus  5/26/17 Pt can wean as long as behaviors remain appropriate and is compliant with safety checks.   Outcome: Improving  Patient weaning most of shift.  Attended all groups and was compliant with all safety checks when returning to [facility]ICU.  Would like to move out of MHICU when possible.

## 2017-05-27 NOTE — PLAN OF CARE
"Problem: Goal Outcome Summary  Goal: Goal Outcome Summary  Patient was cooperative with nurse assessment and medications. Patient denies anxiety, depression, SI, and pain. Patient is frustrated he has to have a room in the back but was ok with why he is still in the back. Patient did wean to open unit all shift and was cooperative with contraband checks. Patient did not have any impulsive behaviors this shift. Patient did attend groups and behaviors were appropriate.     1958 Patient is complaining of bilateral abd pain he states its from swallowing the colored pencils. Patient has hypoactive bowl sounds. Patient did report that he has a normal BM this morning but he said \" It smelt like colored pencils. Patient was encouraged to drink extra water.  Patient was given 650 mg tylenol 2018. 2100 Patient reports that his abd pain is better after talking with staff and walking.     Problem: Depression (Adult,Obstetrics,Pediatric)  Goal: Establish/Maintain Self-Care Routine  Patient will maintain ADL routine by d/c.   Patient maintaining appropriate ADL routine.  Goal: Improved/Stable Mood  Patient will verbalize a decrease in depression by d/c.   Patient denies depression.    Problem: Anxiety (Adult)  Goal: Reduction/Resolution  Patient will verbalize a decrease in anxiety by d/c.   Patient denies anxiety.    Problem: Suicide Risk (Adult)  Goal: Strength-Based Wellness/Recovery  Patient will be able to verbalize at least 2 coping mechanisms by d/c.  Patient will attend at least 50% of groups daily.   Patient attending groups and is appropriate.  Goal: Physical Safety  Patient will verbalize no SI each shift until d/c.   Patient denies SI.    Problem: Additional Goals: Nursing Focus  Goal: 1. Patient Goal: Nursing Focus  5/27/17 Pt can wean as long as behaviors remain appropriate and is compliant with safety checks.   Patient weaned to open unit this shift and was appropriate.      "

## 2017-05-27 NOTE — PLAN OF CARE
Face to face end of shift report received from Shahriar ECKERT. Rounding completed. Patient observed in group.    Cristina Aiken  5/27/2017  3:38 PM

## 2017-05-27 NOTE — PROGRESS NOTES
[facility]  Psychiatric Progress Note      Impression:   Janusz Escalera is a 46 year old male who presented via [facility] ED with reports of 2 suicide attempts the day of admission.     Janusz is out on the open unit when I go to speak with him. His behavior has been appropriate. Denies further urges to consume inedibles. He has been attending groups and is social with others. He tells me that he was able to talk with his son over the phone today which he was happy about, stating that his son had recently been in a juvenile facility but is now home. He was started on Naltrexone yesterday and is tolerating this well. We did discuss his care plan and he will be allowed to spend time on the open unit during the day and attend groups, though room will remain in the MHICU for closer observation. This will be reevaluated on Monday.           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          Medications:     Prescription Medications as of 5/27/2017             HALOPERIDOL PO Take 5 mg by mouth At Bedtime    Mirtazapine (REMERON PO) Take 30 mg by mouth At Bedtime    TRAZODONE HCL PO Take 100 mg by mouth At Bedtime      Facility Administered Medications as of 5/27/2017             naltrexone (DEPADE;REVIA) tablet 25 mg Take 1 half-tab (25 mg) by mouth 2 times daily    traZODone (DESYREL) tablet 100 mg Take 1 tablet (100 mg) by mouth nightly as needed for sleep    hydrOXYzine (ATARAX) tablet 25-50 mg Take 1-2 tablets (25-50 mg) by mouth  "every 4 hours as needed for anxiety    acetaminophen (TYLENOL) tablet 650 mg Take 2 tablets (650 mg) by mouth every 4 hours as needed for mild pain    alum & mag hydroxide-simethicone (MYLANTA ES/MAALOX  ES) suspension 30 mL Take 30 mLs by mouth every 4 hours as needed for indigestion    magnesium hydroxide (MILK OF MAGNESIA) suspension 30 mL Take 30 mLs by mouth nightly as needed for constipation    bisacodyl (DULCOLAX) Suppository 10 mg Place 1 suppository (10 mg) rectally daily as needed for constipation    OLANZapine (zyPREXA) tablet 10 mg Take 1 tablet (10 mg) by mouth every 2 hours as needed for agitation (associated with psychosis or rufus)    Linked Group 1:  \"Or\" Linked Group Details     OLANZapine (zyPREXA) injection 10 mg Inject 10 mg into the muscle every 2 hours as needed for agitation (associated with psychosis or rufus)    Linked Group 1:  \"Or\" Linked Group Details     nicotine polacrilex (NICORETTE) gum 2-4 mg Place 1-2 each (2-4 mg) inside cheek every hour as needed for other (nicotine withdrawal symptoms)    hypromellose-dextran (ARTIFICAL TEARS) ophthalmic solution 1 drop Place 1 drop into both eyes 3 times daily    eye wash (BSS PLUS) ophthalmic solution 15 mL Place 15 mLs into both eyes 2 times daily as needed (contact storage)    haloperidol (HALDOL) tablet 5 mg Take 1 tablet (5 mg) by mouth At Bedtime    mirtazapine (REMERON) tablet 15 mg Take 1 tablet (15 mg) by mouth At Bedtime    haloperidol (HALDOL) tablet 5 mg Take 1 tablet (5 mg) by mouth 3 times daily as needed for agitation    nicotine Patch in Place Place onto the skin every 8 hours    nicotine patch REMOVAL Place onto the skin daily    nicotine (NICODERM CQ) 21 MG/24HR 24 hr patch 1 patch Place 1 patch onto the skin daily    skin closure adhesive (DERMABOND PEN) pen Apply topically daily    cloNIDine (CATAPRES) tablet 0.1 mg Take 1 tablet (0.1 mg) by mouth 2 times daily as needed (anxiety / methamphetamine withdrawal symptoms)    " "bacitracin ointment Apply topically 2 times daily as needed for wound care        10 point ROS positive for blisters between toes - remain intact, and abraised area left external nostril.       Allergies:     Allergies   Allergen Reactions     Cashews [Nuts] Swelling     Tongue, throat swelling            Psychiatric Examination:   /87  Pulse 83  Temp 98.5  F (36.9  C) (Tympanic)  Resp 18  Ht 1.803 m (5' 11\")  Wt 80.6 kg (177 lb 11.1 oz)  SpO2 97%  BMI 24.78 kg/m2  Weight is 177 lbs 11.05 oz  Body mass index is 24.78 kg/(m^2).    Appearance:  Awake, alert, casually groomed  Attitude:  cooperative  Eye Contact:  good  Mood:  better  Affect:  appropriate  Speech:  clear, coherent  Psychomotor Behavior:  no evidence of tardive dyskinesia, dystonia, or tics  Thought Process:  logical, linear and goal oriented  Associations:  No loosening of associations  Thought Content:  no evidence of suicidal ideation or homicidal ideation and no evidence of psychotic thought  Insight:  good  Judgment:  intact but impulsive  Oriented to:  time, person, and place  Attention Span and Concentration:  intact  Recent and Remote Memory:  intact  Fund of Knowledge: appropriate  Muscle Strength and Tone: normal  Gait and Station: Normal         Labs:     Results for orders placed or performed during the hospital encounter of 05/21/17   XR Abdomen Series    Narrative    PA VIEW CHEST, ABDOMEN AND PELVIS    CLINICAL HISTORY:  Swallowed foreign object.    FINDINGS:  Cardiac silhouette and pulmonary vasculature are within  normal limits. The lungs are clear.  Bowel gas pattern is  unremarkable. There is no evidence of radiodense foreign body.    IMPRESSION:  1.  CLEAR LUNGS.  NO ACUTE ABNORMALITY IN THE ABDOMEN/PELVIS.    2.  NO DISTINCT EVIDENCE OF RADIODENSE FOREIGN BODY.  Exam Date: May 23, 2017 03:58:17 PM  Author: LAURA YUN  This report is final and signed       No new labs today.            Plan:   Will continue current " plan     Continue Haldol 5mg at bed and tid prn for psychosis. Encourage PRN Haldol for increased anxiety with obsessive thoughts that result in compulsive behaviors.  Continue Remeron 15mg at bed.  Start Naltrexone 25mg bid.   Petition for commitment filed  MHICU bed for increased observation

## 2017-05-28 PROCEDURE — 12400000 ZZH R&B MH

## 2017-05-28 PROCEDURE — 25000132 ZZH RX MED GY IP 250 OP 250 PS 637: Performed by: NURSE PRACTITIONER

## 2017-05-28 RX ADMIN — MIRTAZAPINE 15 MG: 15 TABLET, FILM COATED ORAL at 21:03

## 2017-05-28 RX ADMIN — DEXTRAN 70, AND HYPROMELLOSE 2910 1 DROP: 1; 3 SOLUTION/ DROPS OPHTHALMIC at 14:58

## 2017-05-28 RX ADMIN — DEXTRAN 70, AND HYPROMELLOSE 2910 1 DROP: 1; 3 SOLUTION/ DROPS OPHTHALMIC at 21:03

## 2017-05-28 RX ADMIN — Medication 25 MG: at 21:03

## 2017-05-28 RX ADMIN — DEXTRAN 70, AND HYPROMELLOSE 2910 1 DROP: 1; 3 SOLUTION/ DROPS OPHTHALMIC at 08:40

## 2017-05-28 RX ADMIN — HALOPERIDOL 5 MG: 5 TABLET ORAL at 21:03

## 2017-05-28 RX ADMIN — Medication 25 MG: at 08:40

## 2017-05-28 RX ADMIN — HALOPERIDOL 5 MG: 5 TABLET ORAL at 11:03

## 2017-05-28 RX ADMIN — NICOTINE 1 PATCH: 21 PATCH, EXTENDED RELEASE TRANSDERMAL at 08:40

## 2017-05-28 ASSESSMENT — ACTIVITIES OF DAILY LIVING (ADL)
DRESS: INDEPENDENT
ORAL_HYGIENE: INDEPENDENT
GROOMING: INDEPENDENT
LAUNDRY: UNABLE TO COMPLETE

## 2017-05-28 NOTE — PLAN OF CARE
Face to face end of shift report received from Cristina ECKERT. Rounding completed. Patient observed in bed.     Dottie Mann  5/27/2017  11:31 PM

## 2017-05-28 NOTE — PLAN OF CARE
Problem: Goal Outcome Summary  Goal: Goal Outcome Summary  Patient was calm and cooperative with nurse assessment. Patient out in the lounge and, is social with peers, and is attending groups. Patient denies anxiety, depression, SI, and SIB. Patient denies pain and hallucinations. Patient agreed to come find staff if feeling like needing a PRN.    Problem: Depression (Adult,Obstetrics,Pediatric)  Goal: Establish/Maintain Self-Care Routine  Patient will maintain ADL routine by d/c.   Patient maintain routine ADL's.  Goal: Improved/Stable Mood  Patient will verbalize a decrease in depression by d/c.   Patient denies depression.    Problem: Anxiety (Adult)  Goal: Reduction/Resolution  Patient will verbalize a decrease in anxiety by d/c.   Patient denies anxiety.    Problem: Suicide Risk (Adult)  Goal: Strength-Based Wellness/Recovery  Patient will be able to verbalize at least 2 coping mechanisms by d/c.  Patient will attend at least 50% of groups daily.   Patient attending groups this shift.   Goal: Physical Safety  Patient will verbalize no SI each shift until d/c.   Patient denies SI.    Problem: Additional Goals: Nursing Focus  Goal: 1. Patient Goal: Nursing Focus  5/28/17 Pt can wean as long as behaviors remain appropriate and is compliant with safety checks.   Patient weaning this shift and was appropriate.

## 2017-05-28 NOTE — PLAN OF CARE
Problem: Goal Outcome Summary  Goal: Goal Outcome Summary  Outcome: Improving  Patient denies anxiety, depression, SI, and pain. Patient weans to open unit all shift and was cooperative with checks when needing to go to the back to void. Patient did not have any impulsive behaviors this shift. Patient attends groups and has been calm and appropriate.     1100 Patient reports increasing anxiety and was given prn Haldol with good effect.     Pt got his hair trimmed by the male [facility] this AM and pleased with his clean cut.    Problem: Depression (Adult,Obstetrics,Pediatric)  Goal: Establish/Maintain Self-Care Routine  Patient will maintain ADL routine by d/c.   Patient maintaining appropriate ADL routine.  Goal: Improved/Stable Mood  Patient will verbalize a decrease in depression by d/c.   Patient denies depression.     Problem: Anxiety (Adult)  Goal: Reduction/Resolution  Patient will verbalize a decrease in anxiety by d/c.   Patient denies anxiety.     Problem: Suicide Risk (Adult)  Goal: Strength-Based Wellness/Recovery  Patient will be able to verbalize at least 2 coping mechanisms by d/c.  Patient will attend at least 50% of groups daily.   Patient attending groups and is appropriate.  Goal: Physical Safety  Patient will verbalize no SI each shift until d/c.   Patient denies SI.     Problem: Additional Goals: Nursing Focus  Goal: 1. Patient Goal: Nursing Focus  5/27/17 Pt can wean as long as behaviors remain appropriate and is compliant with safety checks.   Patient weaned to open unit this shift and was appropriate.

## 2017-05-29 PROCEDURE — 99232 SBSQ HOSP IP/OBS MODERATE 35: CPT | Performed by: NURSE PRACTITIONER

## 2017-05-29 PROCEDURE — 25000132 ZZH RX MED GY IP 250 OP 250 PS 637: Performed by: NURSE PRACTITIONER

## 2017-05-29 PROCEDURE — 12400000 ZZH R&B MH

## 2017-05-29 RX ORDER — GABAPENTIN 300 MG/1
300 CAPSULE ORAL 3 TIMES DAILY
Status: DISCONTINUED | OUTPATIENT
Start: 2017-05-29 | End: 2017-06-14

## 2017-05-29 RX ADMIN — DEXTRAN 70, AND HYPROMELLOSE 2910 1 DROP: 1; 3 SOLUTION/ DROPS OPHTHALMIC at 13:50

## 2017-05-29 RX ADMIN — Medication 25 MG: at 08:14

## 2017-05-29 RX ADMIN — NICOTINE 1 PATCH: 21 PATCH, EXTENDED RELEASE TRANSDERMAL at 08:13

## 2017-05-29 RX ADMIN — HALOPERIDOL 5 MG: 5 TABLET ORAL at 20:14

## 2017-05-29 RX ADMIN — MIRTAZAPINE 15 MG: 15 TABLET, FILM COATED ORAL at 20:14

## 2017-05-29 RX ADMIN — DEXTRAN 70, AND HYPROMELLOSE 2910 1 DROP: 1; 3 SOLUTION/ DROPS OPHTHALMIC at 08:36

## 2017-05-29 RX ADMIN — GABAPENTIN 300 MG: 300 CAPSULE ORAL at 20:14

## 2017-05-29 RX ADMIN — Medication 25 MG: at 20:14

## 2017-05-29 RX ADMIN — DEXTRAN 70, AND HYPROMELLOSE 2910 1 DROP: 1; 3 SOLUTION/ DROPS OPHTHALMIC at 20:14

## 2017-05-29 RX ADMIN — HALOPERIDOL 5 MG: 5 TABLET ORAL at 12:02

## 2017-05-29 ASSESSMENT — ACTIVITIES OF DAILY LIVING (ADL)
ORAL_HYGIENE: INDEPENDENT
LAUNDRY: UNABLE TO COMPLETE
DRESS: SCRUBS (BEHAVIORAL HEALTH);INDEPENDENT
GROOMING: INDEPENDENT
ORAL_HYGIENE: INDEPENDENT
DRESS: INDEPENDENT
GROOMING: INDEPENDENT

## 2017-05-29 NOTE — PROGRESS NOTES
"[facility]  Psychiatric Progress Note      Impression:   Janusz Escalera is a 46 year old male who presented via [facility] ED with reports of 2 suicide attempts the day of admission.     Janusz tells me that he is feeling \"pretty good\" today. He questions when he will be able to move to a room on the open unit. We did discuss that this will be a treatment team decision and, though disappointed, does state that he understands. He reports no urges to ingest any foreign objects. He has been attending groups and is social with others. He states that he has a court hearing tomorrow and is hopeful to get into a treatment facility soon. He does ask whether he could get back on Gabapentin, which he had taken in the past. I will start this for him today.          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          Medications:     Prescription Medications as of 5/29/2017             HALOPERIDOL PO Take 5 mg by mouth At Bedtime    Mirtazapine (REMERON PO) Take 30 mg by mouth At Bedtime    TRAZODONE HCL PO Take 100 mg by mouth At Bedtime      Facility Administered Medications as of 5/29/2017             gabapentin (NEURONTIN) capsule 300 mg Take 1 capsule (300 mg) by mouth 3 times daily    naltrexone (DEPADE;REVIA) tablet 25 mg Take 1 half-tab (25 mg) by mouth 2 times daily    traZODone (DESYREL) tablet 100 mg Take 1 tablet (100 mg) by mouth nightly as needed for sleep    hydrOXYzine (ATARAX) tablet 25-50 mg Take 1-2 tablets (25-50 mg) by " "mouth every 4 hours as needed for anxiety    acetaminophen (TYLENOL) tablet 650 mg Take 2 tablets (650 mg) by mouth every 4 hours as needed for mild pain    alum & mag hydroxide-simethicone (MYLANTA ES/MAALOX  ES) suspension 30 mL Take 30 mLs by mouth every 4 hours as needed for indigestion    magnesium hydroxide (MILK OF MAGNESIA) suspension 30 mL Take 30 mLs by mouth nightly as needed for constipation    bisacodyl (DULCOLAX) Suppository 10 mg Place 1 suppository (10 mg) rectally daily as needed for constipation    OLANZapine (zyPREXA) tablet 10 mg Take 1 tablet (10 mg) by mouth every 2 hours as needed for agitation (associated with psychosis or rufus)    Linked Group 1:  \"Or\" Linked Group Details     OLANZapine (zyPREXA) injection 10 mg Inject 10 mg into the muscle every 2 hours as needed for agitation (associated with psychosis or rufus)    Linked Group 1:  \"Or\" Linked Group Details     nicotine polacrilex (NICORETTE) gum 2-4 mg Place 1-2 each (2-4 mg) inside cheek every hour as needed for other (nicotine withdrawal symptoms)    hypromellose-dextran (ARTIFICAL TEARS) ophthalmic solution 1 drop Place 1 drop into both eyes 3 times daily    eye wash (BSS PLUS) ophthalmic solution 15 mL Place 15 mLs into both eyes 2 times daily as needed (contact storage)    haloperidol (HALDOL) tablet 5 mg Take 1 tablet (5 mg) by mouth At Bedtime    mirtazapine (REMERON) tablet 15 mg Take 1 tablet (15 mg) by mouth At Bedtime    haloperidol (HALDOL) tablet 5 mg Take 1 tablet (5 mg) by mouth 3 times daily as needed for agitation    nicotine Patch in Place Place onto the skin every 8 hours    nicotine patch REMOVAL Place onto the skin daily    nicotine (NICODERM CQ) 21 MG/24HR 24 hr patch 1 patch Place 1 patch onto the skin daily    cloNIDine (CATAPRES) tablet 0.1 mg Take 1 tablet (0.1 mg) by mouth 2 times daily as needed (anxiety / methamphetamine withdrawal symptoms)    bacitracin ointment Apply topically 2 times daily as needed for " "wound care    skin closure adhesive (DERMABOND PEN) pen (Discontinued) Apply topically daily        10 point ROS positive for blisters between toes that are healing well       Allergies:     Allergies   Allergen Reactions     Cashews [Nuts] Swelling     Tongue, throat swelling            Psychiatric Examination:   /64  Pulse 80  Temp 97.8  F (36.6  C) (Tympanic)  Resp 16  Ht 1.803 m (5' 11\")  Wt 86.7 kg (191 lb 2.2 oz)  SpO2 98%  BMI 26.66 kg/m2  Weight is 191 lbs 2.22 oz  Body mass index is 26.66 kg/(m^2).    Appearance:  Awake, alert, casually groomed  Attitude:  Cooperative, pleasant  Eye Contact: good  Mood: improving  Affect:  appropriate  Speech:  clear, coherent  Psychomotor Behavior:  no evidence of tardive dyskinesia, dystonia, or tics  Thought Process:  logical, linear and goal oriented  Associations:  No loosening of associations  Thought Content:  no evidence of suicidal ideation or homicidal ideation and no evidence of psychotic thought  Insight:  good  Judgment:  intact but impulsive  Oriented to:  time, person, and place  Attention Span and Concentration:  intact  Recent and Remote Memory:  intact  Fund of Knowledge: appropriate  Muscle Strength and Tone: normal  Gait and Station: Normal         Labs:     Results for orders placed or performed during the hospital encounter of 05/21/17   XR Abdomen Series    Narrative    PA VIEW CHEST, ABDOMEN AND PELVIS    CLINICAL HISTORY:  Swallowed foreign object.    FINDINGS:  Cardiac silhouette and pulmonary vasculature are within  normal limits. The lungs are clear.  Bowel gas pattern is  unremarkable. There is no evidence of radiodense foreign body.    IMPRESSION:  1.  CLEAR LUNGS.  NO ACUTE ABNORMALITY IN THE ABDOMEN/PELVIS.    2.  NO DISTINCT EVIDENCE OF RADIODENSE FOREIGN BODY.  Exam Date: May 23, 2017 03:58:17 PM  Author: LAURA YUN  This report is final and signed       No new labs today.            Plan:   Petition for commitment " filed  MHICU bed for increased observation until evaluated by treatment team on Tuesday  Start Gabapentin 300 mg TID, increase as tolerated

## 2017-05-29 NOTE — PLAN OF CARE
Problem: Goal Outcome Summary  Goal: Goal Outcome Summary  Outcome: Improving  Patient remains in a room in the MHICU r/t requiring a higher level of observation r/t reports of swallowing inedible objects.  Patient denies any feelings of compulsions to swallow anything today.  Patient's blisters on feet/toes are dried and calloused.  No open areas, NP updated.  Patient is cooperative and weaning to open unit for groups and meals.  Patient cooperates with contraband searches prior to returning to ICU.  Patient is able to make his needs known to staff; no questions about his medications or his treatment plan.    1200:  Patient complained of increased anxiety; requested and given Haldol 5 mg po.  Patient educated on potential side effects and the higher risk in  males.    1350:  Patient has no further complaints of anxiety; sitting calmly in lounge coloring.        Problem: Depression (Adult,Obstetrics,Pediatric)  Goal: Establish/Maintain Self-Care Routine  Patient will maintain ADL routine by d/c.   Outcome: Completed Date Met:  05/29/17  No issues with maintaining ADLs.  Goal: Improved/Stable Mood  Patient will verbalize a decrease in depression by d/c.   Outcome: Improving  Patient has future oriented conversations; expresses more hopefulness.    Problem: Anxiety (Adult)  Goal: Reduction/Resolution  Patient will verbalize a decrease in anxiety by d/c.   Outcome: Improving  No complaints of anxiety this AM.    Problem: Suicide Risk (Adult)  Goal: Strength-Based Wellness/Recovery  Patient will be able to verbalize at least 2 coping mechanisms by d/c.  Patient will attend at least 50% of groups daily.   Outcome: Therapy, progress towards functional goals is fair  Patient is attending and participating in groups.  Goal: Physical Safety  Patient will verbalize no SI each shift until d/c.   Outcome: Improving  Patient denies suicidal ideation or intent.    Problem: Additional Goals: Nursing Focus  Goal:  1. Patient Goal: Nursing Focus  5/28/17 Pt can wean as long as behaviors remain appropriate and is compliant with safety checks.   Outcome: Therapy, progress toward functional goals as expected  Patient cooperative with contraband searches; behavior is calm and cooperative this shift so patient is weaning to groups.

## 2017-05-29 NOTE — PLAN OF CARE
Face to face end of shift report received from Ita shepard RN. Rounding completed. Patient observed in group.     Stephanie Medina  5/29/2017  3:59 PM

## 2017-05-29 NOTE — PLAN OF CARE
Face to face end of shift report received from Dottie COFFEY RN. Rounding completed. Patient observed.     Ita Montenegro  5/29/2017  7:34 AM

## 2017-05-29 NOTE — PLAN OF CARE
Face to face end of shift report received from Cristina ECKERT. Rounding completed. Patient observed in bed.     Dottie Mann  5/28/2017  11:25 PM

## 2017-05-29 NOTE — PLAN OF CARE
"Problem: Goal Outcome Summary  Goal: Goal Outcome Summary  Patient denies SI, HI, hallucinations, anxiety, depression, and thoughts of ingesting objects.  Patient c/o left ear being \"plugged\" and pressure.  He states it is hard to hear out of, especially when he is in a room that echos.  Patient appears anxious while talking about this.  He states that he needs to go to the ED and have the wax removed.  Patient is able to make his needs know by requesting to use the restroom and return to ICU when he would like to rest.  Patient was attending groups at the beginning of this shift and left group due to his ear. He did nap for a few hours.  His affect is blunted.   2000: Patient woke from sleeping stating that he had a bad dream.  Perspiration was saturated through his scrubs.  Patient did shower.  He denies anxiety.  He states that he occasionally has nightmares or vivid dreams.      Problem: Depression (Adult,Obstetrics,Pediatric)  Goal: Improved/Stable Mood  Patient will verbalize a decrease in depression by d/c.   Patient does not endorse depression    Problem: Anxiety (Adult)  Goal: Reduction/Resolution  Patient will verbalize a decrease in anxiety by d/c.   Patient appears anxious this shift    Problem: Suicide Risk (Adult)  Goal: Strength-Based Wellness/Recovery  Patient will be able to verbalize at least 2 coping mechanisms by d/c.  Patient will attend at least 50% of groups daily.   Patient does not verbalize  Goal: Physical Safety  Patient will verbalize no SI each shift until d/c.   Patient does not verbalize SI    Problem: Additional Goals: Nursing Focus  Goal: 1. Patient Goal: Nursing Focus  5/29/17 Pt can wean as long as behaviors remain appropriate and is compliant with safety checks.   Patient able to wean this shift      "

## 2017-05-30 PROCEDURE — 25000132 ZZH RX MED GY IP 250 OP 250 PS 637: Performed by: NURSE PRACTITIONER

## 2017-05-30 PROCEDURE — 97530 THERAPEUTIC ACTIVITIES: CPT | Mod: GO

## 2017-05-30 PROCEDURE — 12400000 ZZH R&B MH

## 2017-05-30 PROCEDURE — 40000133 ZZH STATISTIC OT WARD VISIT

## 2017-05-30 PROCEDURE — 12400011

## 2017-05-30 RX ADMIN — NICOTINE 1 PATCH: 21 PATCH, EXTENDED RELEASE TRANSDERMAL at 08:18

## 2017-05-30 RX ADMIN — MIRTAZAPINE 15 MG: 15 TABLET, FILM COATED ORAL at 20:17

## 2017-05-30 RX ADMIN — GABAPENTIN 300 MG: 300 CAPSULE ORAL at 13:39

## 2017-05-30 RX ADMIN — DEXTRAN 70, AND HYPROMELLOSE 2910 1 DROP: 1; 3 SOLUTION/ DROPS OPHTHALMIC at 20:17

## 2017-05-30 RX ADMIN — Medication 25 MG: at 08:18

## 2017-05-30 RX ADMIN — DEXTRAN 70, AND HYPROMELLOSE 2910 1 DROP: 1; 3 SOLUTION/ DROPS OPHTHALMIC at 13:39

## 2017-05-30 RX ADMIN — GABAPENTIN 300 MG: 300 CAPSULE ORAL at 08:17

## 2017-05-30 RX ADMIN — DEXTRAN 70, AND HYPROMELLOSE 2910 1 DROP: 1; 3 SOLUTION/ DROPS OPHTHALMIC at 08:17

## 2017-05-30 RX ADMIN — HALOPERIDOL 5 MG: 5 TABLET ORAL at 20:17

## 2017-05-30 RX ADMIN — GABAPENTIN 300 MG: 300 CAPSULE ORAL at 20:17

## 2017-05-30 RX ADMIN — ACETAMINOPHEN 650 MG: 325 TABLET, FILM COATED ORAL at 07:58

## 2017-05-30 RX ADMIN — Medication 25 MG: at 20:17

## 2017-05-30 ASSESSMENT — ACTIVITIES OF DAILY LIVING (ADL)
GROOMING: INDEPENDENT
LAUNDRY: UNABLE TO COMPLETE
ORAL_HYGIENE: INDEPENDENT
DRESS: SCRUBS (BEHAVIORAL HEALTH);INDEPENDENT
ORAL_HYGIENE: INDEPENDENT
GROOMING: INDEPENDENT
DRESS: SCRUBS (BEHAVIORAL HEALTH);INDEPENDENT

## 2017-05-30 NOTE — PLAN OF CARE
"Problem: Goal Outcome Summary  Goal: Goal Outcome Summary  Patient denies SI, HI, hallucinations, pain, depression, or urge to ingest objects.  Patient verbalizes anxiety that is moderate and he states it is controllable with music and walking.  Patient is \"excited\" to see his uncle tomorrow that he has not seen in 4 years.  He asks if uncle could take his debit card to get snacks for him.  Sticky note left for treatment team.  Patient has blunted affect, is calm and cooperative, is attending groups, and is medication compliant.  Patient states that he is having vivid dreams and sometimes nightmares.  Last night he had a nightmare that he was being stabbed.  Encouraged him to speak with NP regarding this.  Patient states that he is not upset about commitment and is looking forward to the next step and moving on.    Problem: Depression (Adult,Obstetrics,Pediatric)  Goal: Improved/Stable Mood  Patient will verbalize a decrease in depression by d/c.   Patient denies     Problem: Anxiety (Adult)  Goal: Reduction/Resolution  Patient will verbalize a decrease in anxiety by d/c.   Patient verbalizes moderate anxiety that is controllable     Problem: Suicide Risk (Adult)  Goal: Strength-Based Wellness/Recovery  Patient will be able to verbalize at least 2 coping mechanisms by d/c.  Patient will attend at least 50% of groups daily.   Patient listens to music and walks  Goal: Physical Safety  Patient will verbalize no SI each shift until d/c.   Patient denies      "

## 2017-05-30 NOTE — PLAN OF CARE
Face to face end of shift report received from Jaqueline SIMS RN. Rounding completed. Patient observed in group.    Martina Wilkes  5/30/2017  3:36 PM

## 2017-05-30 NOTE — PLAN OF CARE
Problem: Goal Outcome Summary  Goal: Goal Outcome Summary  Outcome: Improving  Patient denies all criteria this am except for a headache. PRN Tylenol 650mg po given at 0758 per request and by 0850 patient stated that his headache was gone. Patient attended more than 50% of groups. He has been medication compliant this shift. He socializes with peers t/o shift. Patient was moved out onto the open unit today, which he was very happy about. Writer encouraged patient to maintain boundaries and be compliant with requests so he can remain out on the open unit. Affect remains blunted and flat and mood is calm today. Patient had court today and is now on a 6 month commitment. No court date on June 8th as patient waived that and agreed to commitment. Status 15 d/c. Patient has remained appropriate out on the open unit.    Problem: Depression (Adult,Obstetrics,Pediatric)  Goal: Improved/Stable Mood  Patient will verbalize a decrease in depression by d/c.   Outcome: No Change  Patient denies depression this am.    Problem: Anxiety (Adult)  Goal: Reduction/Resolution  Patient will verbalize a decrease in anxiety by d/c.   Outcome: Improving  Patient denies anxiety this am.     Problem: Suicide Risk (Adult)  Goal: Strength-Based Wellness/Recovery  Patient will be able to verbalize at least 2 coping mechanisms by d/c.  Patient will attend at least 50% of groups daily.   Outcome: No Change  Patient was able to verbalize coping mechanisms today. Patient attended at least 50% of groups today.  Goal: Physical Safety  Patient will verbalize no SI each shift until d/c.   Outcome: Improving  Patient denies SI this shift.

## 2017-05-30 NOTE — PLAN OF CARE
Called attending NP regarding pt's complaint of left ear being plugged.  Provider stated she would review chart and would put in an order if needed.

## 2017-05-30 NOTE — PLAN OF CARE
Face to face end of shift report received from gabrielle Mack. Rounding completed. Patient observed. Lying on right side - eyes closed - non-labored breathing noted. - continuous camera and physical checks every 15 min. continue    Juli Farley  5/30/2017  2:40 AM

## 2017-05-30 NOTE — PLAN OF CARE
Problem: Goal Outcome Summary  Goal: Goal Outcome Summary  Outcome: Improving  Pt began to make his own weighted collar for calming and relaxation.  Pt educated in the steps to make the collar.  Pt issued handout on identifying his own calming and alerting activities for emotion regulation and managing stress and anxiety.  Will review handout and pts answers at later session.   Will continue with OT to further address coping skills.

## 2017-05-30 NOTE — PLAN OF CARE
Problem: Discharge Planning  Goal: Discharge Planning (Adult, OB, Behavioral, Peds)  Outcome: Improving  Patient had commitment court today.  The  ordered that patient is committed for 6 months. He is awaiting an inpatient MI/CD treatment placement.

## 2017-05-31 PROCEDURE — 40000133 ZZH STATISTIC OT WARD VISIT

## 2017-05-31 PROCEDURE — 25000132 ZZH RX MED GY IP 250 OP 250 PS 637: Performed by: NURSE PRACTITIONER

## 2017-05-31 PROCEDURE — 97530 THERAPEUTIC ACTIVITIES: CPT | Mod: GO

## 2017-05-31 PROCEDURE — 12400011

## 2017-05-31 RX ADMIN — DEXTRAN 70, AND HYPROMELLOSE 2910 1 DROP: 1; 3 SOLUTION/ DROPS OPHTHALMIC at 08:18

## 2017-05-31 RX ADMIN — Medication 25 MG: at 08:18

## 2017-05-31 RX ADMIN — NICOTINE 1 PATCH: 21 PATCH, EXTENDED RELEASE TRANSDERMAL at 08:17

## 2017-05-31 RX ADMIN — GABAPENTIN 300 MG: 300 CAPSULE ORAL at 20:28

## 2017-05-31 RX ADMIN — GABAPENTIN 300 MG: 300 CAPSULE ORAL at 13:46

## 2017-05-31 RX ADMIN — GABAPENTIN 300 MG: 300 CAPSULE ORAL at 08:18

## 2017-05-31 RX ADMIN — HALOPERIDOL 5 MG: 5 TABLET ORAL at 15:33

## 2017-05-31 RX ADMIN — DEXTRAN 70, AND HYPROMELLOSE 2910 1 DROP: 1; 3 SOLUTION/ DROPS OPHTHALMIC at 13:46

## 2017-05-31 RX ADMIN — Medication 25 MG: at 20:28

## 2017-05-31 RX ADMIN — HALOPERIDOL 5 MG: 5 TABLET ORAL at 20:28

## 2017-05-31 RX ADMIN — MIRTAZAPINE 15 MG: 15 TABLET, FILM COATED ORAL at 20:28

## 2017-05-31 ASSESSMENT — ACTIVITIES OF DAILY LIVING (ADL)
ORAL_HYGIENE: INDEPENDENT
GROOMING: INDEPENDENT
DRESS: INDEPENDENT;SCRUBS (BEHAVIORAL HEALTH)
LAUNDRY: UNABLE TO COMPLETE

## 2017-05-31 NOTE — DISCHARGE INSTRUCTIONS
Behavioral Discharge Planning and Instructions    Summary: Janusz was admitted for a suicide attempt.    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     Symptoms to Report: feeling more aggressive, increased confusion, losing more sleep, mood getting worse or thoughts of suicide    Lifestyle Adjustment: Take all medications as prescribed, meet with doctor/ medication provider, out patient therapist, , and [facility] worker as scheduled. Abstain from alcohol or any unprescribed drugs.     Psychiatry Follow-up:     [facility]   - [place] [phone]  [address] [place] [id number]  Phone [phone]     Fax- [phone]    Resources:   Crisis Intervention: 967.211.3398 or 193-074-5021 (TTY: 100.712.7944).  Call anytime for help.  National [place] on Mental Illness (www.mn.dangelo.org): 831.584.1909 or 385-665-8351.  Alcoholics Anonymous (www.alcoholics-anonymous.org): Check your phone book for your local chapter.  Suicide Awareness Voices of Education (SAVE) (www.save.org): 687-822-WLPB (7883)  National Suicide Prevention Line (www.mentalhealthmn.org): 782-710-OELT (3836)  Mental Health Consumer/Survivor Network of [place] (www.mhcsn.net): 228.584.8794 or 803-503-4056  Mental Health Association of [place] (www.mentalhealth.org): 276.358.3284 or 955-844-0679    General Medication Instructions:   See your medication sheet(s) for instructions.   Take all medicines as directed.  Make no changes unless your doctor suggests them.   Go to all your doctor visits.  Be sure to have all your required  lab tests. This way, your medicines can be refilled on time.  Do not use any drugs not prescribed by your doctor.  Avoid alcohol.    [place] Area:  [facility], Crisis stabilization [facility]- [phone]  [facility] Crisis Line: 1-731.719.7388  [facility] Family [facility]: 845-6326  Sexual Assault Program of [facility]: 326.201.8267 or 1-919.434.3918  [place] Battered Women's Program: 1-815.122.4005 Ext: 279       Calls answered Mon-Fri-8:00 am--4:30 pm    [place]:  [facility] Family Peace: 1-317.371.7632  [facility] first call for help: 1-733.330.8100  [facility]:  (360) 680-2103      [place] Area:  Warm Line: 1-494.377.9025       Calls answered Tuesday--Saturday 4:00 pm--10:00 pm  [place] Crisis Line - 722.578.4436  [place] [facility] 743-245-8965    [place] Statewide:  [place] Crisis and Referral Services: 1-724.823.9490  National Suicide Prevention Lifeline: 6-954-967-TALK (6738)   - goh1hafb- Text  Life  to 55235  First Call for Help: 2-1-1  [facility] Helpline- 6-670-SNAC-HELP

## 2017-05-31 NOTE — PLAN OF CARE
"Problem: Goal Outcome Summary  Goal: Goal Outcome Summary  Pt is pleasant and cooperative with nursing assessment. He denies SI, HI, and hallucinations. Pt says he is just getting bored. Says he is waiting for a bed to open somewhere so he can leave. Pt gets angry talking about his ear being plugged. Says it has been plugged since like January and everything is muffled. Pt states, \"can't I just go down to urgent care or the ER so they can drain my ear quick.\" Encouraged pt to talk with provider about his frustration with his ear tomorrow as they are gone for the day at this time. Pt is endorsing in some anxiety saying its \"high.\" Says he tried to lay down and rest but that didn't seem to help. PRN Haldol 5 mg po given at 1533. Denies Pain. Pt is compliant with prescribed medications.    2030: Pt declined eye drops at bed time as he has taken out his contacts.     Problem: Depression (Adult,Obstetrics,Pediatric)  Goal: Improved/Stable Mood  Patient will verbalize a decrease in depression by d/c.   Outcome: Therapy, progress toward functional goals is gradual  Pt denies depression    Problem: Anxiety (Adult)  Goal: Reduction/Resolution  Patient will verbalize a decrease in anxiety by d/c.   Outcome: No Change  Pt endorsing in \"high\" anxiety. PRN haldol 5 mg po at 1533    Problem: Suicide Risk (Adult)  Goal: Strength-Based Wellness/Recovery  Patient will be able to verbalize at least 2 coping mechanisms by d/c.  Patient will attend at least 50% of groups daily.   Outcome: Therapy, progress toward functional goals is gradual  Pt didn't attend many groups this shift.   Goal: Physical Safety  Patient will verbalize no SI each shift until d/c.   Outcome: Therapy, progress toward functional goals is gradual  Pt denies SI      "

## 2017-05-31 NOTE — PLAN OF CARE
Problem: Goal Outcome Summary  Goal: Goal Outcome Summary  Pt finished making his weighted collar.  Pt issued his own sensory box. Pt identified relaxing items he would like in his sensory box to help manage his emotions and stress.  Will continue with OT to further address coping techniques.

## 2017-05-31 NOTE — PLAN OF CARE
Face to face end of shift report received from Juli REY RN. Rounding completed. Patient observed.     Amee Alcaraz  5/31/2017  8:38 AM

## 2017-05-31 NOTE — PLAN OF CARE
Face to face end of shift report received from gabrielle Mack . Rounding completed. Patient observed. Lying on left side - eyes closed - non-labored breathing noted.     Juli Farley  5/31/2017  2:10 AM

## 2017-05-31 NOTE — PLAN OF CARE
Face to face end of shift report received from TOMEKA Lubin. Rounding completed. Patient observed in room.     Liliana Cooper  5/31/2017  3:49 PM

## 2017-05-31 NOTE — PLAN OF CARE
Problem: Goal Outcome Summary  Goal: Goal Outcome Summary  Outcome: Improving  Patient is alert and is up on the unit. Has showered and ate well at breakfast meal. Patient states that he is doing a lot better now that he has been committed and he can start on a new plan to get better. Patient is pleasant with good eye contact. He states his depression is improving and denies any thoughts of self harm. He denies any current anxiety, pain and has had no falls.     Problem: Depression (Adult,Obstetrics,Pediatric)  Goal: Improved/Stable Mood  Patient will verbalize a decrease in depression by d/c.   Outcome: Improving  Patient states that depression is improving this shift.    Problem: Anxiety (Adult)  Goal: Reduction/Resolution  Patient will verbalize a decrease in anxiety by d/c.   Outcome: Improving  Patient denies having any anxiety this shift.    Problem: Suicide Risk (Adult)  Goal: Strength-Based Wellness/Recovery  Patient will be able to verbalize at least 2 coping mechanisms by d/c.  Patient will attend at least 50% of groups daily.   Patient has been attending groups this shift.  Goal: Physical Safety  Patient will verbalize no SI each shift until d/c.   Outcome: Improving  Patient denies having no suicidal thoughts this shift.

## 2017-05-31 NOTE — PLAN OF CARE
Treatment team care conference held 05/31/17    Attendees:Nursing, , Occupational Therapy, NP, Manager/Supervisor and recreational therapist.     MD/NP: Patient has been admitted 10 days and still meets hospital inpatient criteria.    RN/Charge Nurse:     Restraint/seclusion, disruptive unsafe behavior: No    Symptoms progress (SI and SIB urges, AH, etc.): Patient symptoms are resolving.    Sleep, nutrition, significant weight change: no recent changes    PRN use/results, side effects, labs, medical issues needing attention: No    Review DC checklist is discharge occurring within 48 hours: No    Review follow up medical appointments needed: Yes:   OT and Krystyna PIÑA: Gives report including:    Group attendance: Yes: insightful, social with peers    Madeleine observation: Yes:     Interactions with others: Yes: appropriate    Observed symptoms: cooperative and pleasant, pt worries he will relapse if not discharged to a locked  treatment facility  Social work team: Provide updates including:    Legal status (need to sign in, petition for commitment, etc.): Pt is currently under a MI Commitment    Collateral history from family, , etc.: Per     Progress toward ideal aftercare plan: Some progress    Follow up medical appointments made and added to AVS: No    Obstacles to Discharge: vague responses from pt., pt not offering information  Provider (MD/NP): Provides updates on the treatment plan:    Symptoms that must improve before discharge: anxiety and depression    Med changes targeting these symptoms: start gabapentin yesterday    Aftercare needs identified (Day Tx, CD Tx, IRTS, etc.): locked CARE program    Expected discharge date: dependent on placement    Have we entered into avoidable days: No    Goal(s) met: no urges to eat anything inedible    Goal(s) to continue: symptom management    New Goal(s): no new goals.

## 2017-06-01 PROCEDURE — 99232 SBSQ HOSP IP/OBS MODERATE 35: CPT | Performed by: PSYCHIATRY & NEUROLOGY

## 2017-06-01 PROCEDURE — 25000132 ZZH RX MED GY IP 250 OP 250 PS 637: Performed by: PSYCHIATRY & NEUROLOGY

## 2017-06-01 PROCEDURE — 25000132 ZZH RX MED GY IP 250 OP 250 PS 637: Performed by: NURSE PRACTITIONER

## 2017-06-01 PROCEDURE — 97530 THERAPEUTIC ACTIVITIES: CPT | Mod: GO

## 2017-06-01 PROCEDURE — 40000133 ZZH STATISTIC OT WARD VISIT

## 2017-06-01 PROCEDURE — 12400011

## 2017-06-01 RX ADMIN — GABAPENTIN 300 MG: 300 CAPSULE ORAL at 08:10

## 2017-06-01 RX ADMIN — DEXTRAN 70, AND HYPROMELLOSE 2910 1 DROP: 1; 3 SOLUTION/ DROPS OPHTHALMIC at 20:32

## 2017-06-01 RX ADMIN — NICOTINE 1 PATCH: 21 PATCH, EXTENDED RELEASE TRANSDERMAL at 08:08

## 2017-06-01 RX ADMIN — GABAPENTIN 300 MG: 300 CAPSULE ORAL at 13:19

## 2017-06-01 RX ADMIN — GABAPENTIN 300 MG: 300 CAPSULE ORAL at 20:32

## 2017-06-01 RX ADMIN — HALOPERIDOL 5 MG: 5 TABLET ORAL at 20:32

## 2017-06-01 RX ADMIN — Medication 25 MG: at 08:09

## 2017-06-01 RX ADMIN — Medication 25 MG: at 20:31

## 2017-06-01 RX ADMIN — DEXTRAN 70, AND HYPROMELLOSE 2910 1 DROP: 1; 3 SOLUTION/ DROPS OPHTHALMIC at 13:19

## 2017-06-01 RX ADMIN — Medication 3 DROP: at 20:32

## 2017-06-01 RX ADMIN — DEXTRAN 70, AND HYPROMELLOSE 2910 1 DROP: 1; 3 SOLUTION/ DROPS OPHTHALMIC at 08:09

## 2017-06-01 RX ADMIN — HALOPERIDOL 5 MG: 5 TABLET ORAL at 16:05

## 2017-06-01 RX ADMIN — MIRTAZAPINE 15 MG: 15 TABLET, FILM COATED ORAL at 20:32

## 2017-06-01 ASSESSMENT — ACTIVITIES OF DAILY LIVING (ADL)
GROOMING: INDEPENDENT
ORAL_HYGIENE: INDEPENDENT
DRESS: INDEPENDENT;SCRUBS (BEHAVIORAL HEALTH)
GROOMING: INDEPENDENT
DRESS: SCRUBS (BEHAVIORAL HEALTH);INDEPENDENT

## 2017-06-01 NOTE — PLAN OF CARE
Face to face end of shift report received from TOMEKA Ferraro. Rounding completed. Patient observed.     Silvia Sneed  6/1/2017  4:21 PM

## 2017-06-01 NOTE — PLAN OF CARE
Problem: Discharge Planning  Goal: Discharge Planning (Adult, OB, Behavioral, Peds)  Writer received a call from [facility] asking about where pt should go- discussed [facility] vs [facility]- decided [facility] be better because [facility] [place] closer [place] he is from and it has MI/CD treatment and [facility] is only MI treatment and both are locked programs.  Pt is now on the [facility] waiting list.   Social work team: Provide updates including:    Legal status (need to sign in, petition for commitment, etc.): Pt is currently under a MICD commitment    Collateral history from family, , etc.:NA    Progress toward ideal aftercare plan: Significant progress    Follow up medical appointments made and added to AVS: No    Obstacles to Discharge: Psychiatric Illness   1:45 pm. Pt requested information about the status of his discharge to a CARE program- writer informed him he was on the list for [facility]. Pt was happy with this news because his family lives close to [facility]. He asked where he was on the list and writer told him she would call and try and find out.     Writer called [facility] and inquired as to where pt was on the waiting list. CPA was unaware that pt have waived his rights to his next hearing and he was fully committed MI/CD during the confinement hearing on May 30th.  They are updating the lists- they mentioned that IV drug users were prioritized on the waiting lists- writer pointed out that pt was using Methamphetamine and Heroin intervenously according to his Rule 25 assessment. Was told they would prioritize pt on their lists given this new information.

## 2017-06-01 NOTE — PLAN OF CARE
Problem: Goal Outcome Summary  Goal: Goal Outcome Summary  Pt issued a few more sensory items for his sensory box.  Pt made a liquid glitter wand for his sensory box.  Pt participated in a mindfulness meditation activity with guidance from OT.  Pt reports decreased anxiety and increased relaxation post meditation activity. Handouts provided.  Will continue with OT to address coping skills vs discharging from therapy.

## 2017-06-01 NOTE — PLAN OF CARE
Face to face end of shift report received from CRISTA Farley RN. Rounding completed. Patient observed, up and about on the unit.     Ronan Alarcon  6/1/2017  7:52 AM

## 2017-06-01 NOTE — PLAN OF CARE
Face to face end of shift report received from gabrielle Ford . Rounding completed. Patient observed. Lying in a prone position - eyes closed - non-labored breathing noted.     Juli Farley  6/1/2017  1:13 AM

## 2017-06-01 NOTE — PROGRESS NOTES
[facility]  Psychiatric Progress Note    Patient seen chart reviewed.          Interim History:   The patient's care was discussed with the treatment team and chart notes were reviewed.     Patient reports that he feels that his moods are doing well. He talks about the Naltrexone and feels that it has been helpful in regards to his cravings. He has noticed that he has been having some nightmares a few times per week lately and they are a bit upsetting to him. He also is complaining that his ears are likely compacted with wax and states that he has had this problem in the past. He denies any SI or HI or psychosis.           Medications:     Current Facility-Administered Medications   Medication Dose Route Frequency     carbamide peroxide  3 drop Both Ears BID     gabapentin  300 mg Oral TID     naltrexone  25 mg Oral BID     hypromellose-dextran  1 drop Both Eyes TID     haloperidol (HALDOL) tablet 5 mg  5 mg Oral At Bedtime     mirtazapine (REMERON) tablet 15 mg  15 mg Oral At Bedtime     nicotine   Transdermal Q8H     nicotine   Transdermal Daily     nicotine  1 patch Transdermal Daily      Current Facility-Administered Medications   Medication Dose Route Frequency     traZODone (DESYREL) tablet 100 mg  100 mg Oral At Bedtime PRN     hydrOXYzine  25-50 mg Oral Q4H PRN     acetaminophen  650 mg Oral Q4H PRN     alum & mag hydroxide-simethicone  30 mL Oral Q4H PRN     magnesium hydroxide  30 mL Oral At Bedtime PRN     bisacodyl  10 mg Rectal Daily PRN     OLANZapine  10 mg Oral Q2H PRN    Or     OLANZapine  10 mg Intramuscular Q2H PRN     nicotine polacrilex  2-4 mg Buccal Q1H PRN     eye wash  15 mL Both Eyes BID PRN     haloperidol  5 mg Oral TID PRN     cloNIDine  0.1 mg Oral BID PRN     bacitracin   Topical BID PRN           Allergies:     Allergies   Allergen Reactions     Cashews [Nuts] Swelling     Tongue, throat swelling            Objective   /78  Pulse 68  Temp 97.3  F (36.3  C)  "(Tympanic)  Resp 18  Ht 1.803 m (5' 11\")  Wt 86.7 kg (191 lb 2.2 oz)  SpO2 97%  BMI 26.66 kg/m2  Weight is 191 lbs 2.22 oz  Body mass index is 26.66 kg/(m^2).    Appearance: Good grooming and hygiene.   Attitude: cooperative   Eye Contact: Good eye contact   Mood: Good  Affect: Congruent to mood   Speech: Normal nonpressured   Psychomotor Behavior: Normal   Thought Process: Linear geol directed   Associations: No loosening of association, Linear goal directed   Thought Content: He denies any suicidal or homicidal ideation.  Abnormal perceptions: He denies any auditory or vissual hallucinations. No paranoia   Oriented to: A&Ox3   Attention Span and Concentration: Good   Recent and Remote Memory: Normal grossly tested   Language: Intact   Fund of Knowledge: Good   Insight: Fair  Judgment: Fair  Muscle Strength and Tone: Normal   Gait and Station: Stable           Labs:    No results found for this or any previous visit (from the past 24 hour(s)).            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 Will order ear drops bid x 2 days and then flush bother ears. I did look with the Otoscope and the left ear appears more so impacted then the right.  2. Will have him remove the nicotine patch at night time as this certainly could be contributing to his nightmares.  3. Continue remainder of medications unchanged.      Attestation:  Total time spent: 30  Greater then 50% of time spent in counseling and coordination of care.  Fredy Pearson MD  "

## 2017-06-01 NOTE — PLAN OF CARE
"Problem: Goal Outcome Summary  Goal: Goal Outcome Summary  Pt at start of shift requested medication for anxiety, assessed and given PRN 5mg Haldol at 1605. Denied SI, HI and hallucinations, denied depression or any particular reason for anxiety rated 6/10. Is up on the unit and attending group. No physical complaints, does have bilateral ear impactions with treatment ongoing. Pt stated he is \"very hopeful\" and looking forward to going to [facility]. Will use alpha stim therapy this evening for anxiety if needed.   2045 - Pt did use Alpha Stim therapy while monitored during group this evening. Pt reported that it caused a relaxed feeling and made him tired. Pt requested his HS medications and went to bed by 2030.    Problem: Depression (Adult,Obstetrics,Pediatric)  Goal: Improved/Stable Mood  Patient will verbalize a decrease in depression by d/c.   Outcome: Therapy, progress toward functional goals as expected  Denied depression today, expressed hopefulness.    Problem: Anxiety (Adult)  Goal: Reduction/Resolution  Patient will verbalize a decrease in anxiety by d/c.   Outcome: Therapy, progress toward functional goals is gradual  Anxiety rated 6/10 this evening, does let staff know needs.    Problem: Suicide Risk (Adult)  Goal: Strength-Based Wellness/Recovery  Patient will be able to verbalize at least 2 coping mechanisms by d/c.  Patient will attend at least 50% of groups daily.   Outcome: Therapy, progress towards functional goals is fair  Is attending groups, discussed items from OT that he finds useful for coping and decreasing anxiety.  Goal: Physical Safety  Patient will verbalize no SI each shift until d/c.   Outcome: Therapy, progress toward functional goals as expected  Denied SI this evening.      "

## 2017-06-01 NOTE — PLAN OF CARE
Problem: Goal Outcome Summary  Goal: Goal Outcome Summary  Pt has been up and about on the unit intermittently throughout the shift. Spent the morning resting in bed, but has been up and going to group this afternoon. Socializes with peers and participates in unit milieu. Pleasant and cooperative on assessment. Denied depression, anxiety, AH/VH, and/or SI. Further denied pain. Pt reports that he slept well last night.     Problem: Depression (Adult,Obstetrics,Pediatric)  Goal: Improved/Stable Mood  Patient will verbalize a decrease in depression by d/c.   Outcome: Improving  Pt denied depression and/or SI on assessment. Attends groups, socializes with peers, and participates in unit milieu.     Problem: Anxiety (Adult)  Goal: Reduction/Resolution  Patient will verbalize a decrease in anxiety by d/c.   Outcome: Improving  Pt denied anxiety on assessment.     Problem: Suicide Risk (Adult)  Goal: Strength-Based Wellness/Recovery  Patient will be able to verbalize at least 2 coping mechanisms by d/c.  Patient will attend at least 50% of groups daily.   Pt spent the morning resting in bed, but has been up on the unit and attending groups this afternoon.  Goal: Physical Safety  Patient will verbalize no SI each shift until d/c.   Outcome: No Change  Pt denied depression and/or SI on assessment. Pt remained free from self-harm and/or injury this shift.

## 2017-06-01 NOTE — PROGRESS NOTES
Follow up.  Current weight is 191# which is up 13# from admission weight of 178#.  IBWR is 155-189#.  Note that current weight is stable from weight 1 year ago per review of weight records.     Regular diet with double portions is ordered and pt is consuming 100% meals.      Remeron has potential to cause increased appetite.      Current weight is slightly above ideal range.  Further weight gain not desired and if occurs would consider d/c of double portions.  RD will continue to follow weights.

## 2017-06-02 PROCEDURE — 12400011

## 2017-06-02 PROCEDURE — 99231 SBSQ HOSP IP/OBS SF/LOW 25: CPT | Performed by: NURSE PRACTITIONER

## 2017-06-02 PROCEDURE — 25000125 ZZHC RX 250: Performed by: NURSE PRACTITIONER

## 2017-06-02 PROCEDURE — 25000132 ZZH RX MED GY IP 250 OP 250 PS 637: Performed by: NURSE PRACTITIONER

## 2017-06-02 RX ADMIN — HALOPERIDOL 5 MG: 5 TABLET ORAL at 15:54

## 2017-06-02 RX ADMIN — GABAPENTIN 300 MG: 300 CAPSULE ORAL at 20:15

## 2017-06-02 RX ADMIN — HALOPERIDOL 5 MG: 5 TABLET ORAL at 11:27

## 2017-06-02 RX ADMIN — NICOTINE 1 PATCH: 21 PATCH, EXTENDED RELEASE TRANSDERMAL at 08:21

## 2017-06-02 RX ADMIN — HALOPERIDOL 5 MG: 5 TABLET ORAL at 20:15

## 2017-06-02 RX ADMIN — Medication 25 MG: at 20:15

## 2017-06-02 RX ADMIN — MIRTAZAPINE 15 MG: 15 TABLET, FILM COATED ORAL at 20:15

## 2017-06-02 RX ADMIN — Medication 3 DROP: at 08:23

## 2017-06-02 RX ADMIN — DEXTRAN 70, AND HYPROMELLOSE 2910 1 DROP: 1; 3 SOLUTION/ DROPS OPHTHALMIC at 15:13

## 2017-06-02 RX ADMIN — GABAPENTIN 300 MG: 300 CAPSULE ORAL at 15:12

## 2017-06-02 RX ADMIN — GABAPENTIN 300 MG: 300 CAPSULE ORAL at 08:21

## 2017-06-02 RX ADMIN — WATER 15 ML: 99.05 SOLUTION OPHTHALMIC at 08:23

## 2017-06-02 RX ADMIN — DEXTRAN 70, AND HYPROMELLOSE 2910 1 DROP: 1; 3 SOLUTION/ DROPS OPHTHALMIC at 20:17

## 2017-06-02 RX ADMIN — Medication 25 MG: at 08:21

## 2017-06-02 RX ADMIN — ACETAMINOPHEN 650 MG: 325 TABLET, FILM COATED ORAL at 16:53

## 2017-06-02 RX ADMIN — Medication 3 DROP: at 20:15

## 2017-06-02 ASSESSMENT — ACTIVITIES OF DAILY LIVING (ADL)
DRESS: SCRUBS (BEHAVIORAL HEALTH);INDEPENDENT
ORAL_HYGIENE: INDEPENDENT
GROOMING: INDEPENDENT

## 2017-06-02 NOTE — PROGRESS NOTES
"[facility]  Psychiatric Progress Note      Impression:   Janusz Escalera is a 46 year old male who presented via [facility] ED with reports of 2 suicide attempts the day of admission. Apparently attempted to hang himself both times while in the woods. Notes indicate that he blacked out after the first attempt and woke with a bloody nose, freed himself and called his sister to bring to the ED. Second attempt details are unclear. Recent Meth use. History of SIB by swallowing foreign objects. CT of neck and xray of abdomen completed with no abnormalities. Sister reported that he will often voluntarily admit self to a facility and then leave. He was placed on a hold.     Patient has been admitted 11 days and still meets hospital inpatient criteria.    Reports feeling irritable and bored today. Requesting to be moved to the other unit in order to \"break up the monotony.\" Believes there are more people and participants in group than where he is at. Sleeping and eating well. Appropriate and cooperative on unit. Feels the Naltrexone has been helpful. Dr. Pearson put him back on drops for his ears. Orders to flush this weekend.        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          Medications:   I have reviewed this patient's current medications  Prescription Medications as of 6/2/2017             HALOPERIDOL PO Take 5 mg by mouth At Bedtime    Mirtazapine (REMERON PO) Take 30 mg by mouth At Bedtime    TRAZODONE HCL PO Take 100 mg by " "mouth At Bedtime      Facility Administered Medications as of 6/2/2017             carbamide peroxide (DEBROX) 6.5 % otic solution 3 drop Place 3 drops into both ears 2 times daily    gabapentin (NEURONTIN) capsule 300 mg Take 1 capsule (300 mg) by mouth 3 times daily    naltrexone (DEPADE;REVIA) tablet 25 mg Take 1 half-tab (25 mg) by mouth 2 times daily    traZODone (DESYREL) tablet 100 mg Take 1 tablet (100 mg) by mouth nightly as needed for sleep    hydrOXYzine (ATARAX) tablet 25-50 mg Take 1-2 tablets (25-50 mg) by mouth every 4 hours as needed for anxiety    acetaminophen (TYLENOL) tablet 650 mg Take 2 tablets (650 mg) by mouth every 4 hours as needed for mild pain    alum & mag hydroxide-simethicone (MYLANTA ES/MAALOX  ES) suspension 30 mL Take 30 mLs by mouth every 4 hours as needed for indigestion    magnesium hydroxide (MILK OF MAGNESIA) suspension 30 mL Take 30 mLs by mouth nightly as needed for constipation    bisacodyl (DULCOLAX) Suppository 10 mg Place 1 suppository (10 mg) rectally daily as needed for constipation    OLANZapine (zyPREXA) tablet 10 mg Take 1 tablet (10 mg) by mouth every 2 hours as needed for agitation (associated with psychosis or rufus)    Linked Group 1:  \"Or\" Linked Group Details     OLANZapine (zyPREXA) injection 10 mg Inject 10 mg into the muscle every 2 hours as needed for agitation (associated with psychosis or rufus)    Linked Group 1:  \"Or\" Linked Group Details     nicotine polacrilex (NICORETTE) gum 2-4 mg Place 1-2 each (2-4 mg) inside cheek every hour as needed for other (nicotine withdrawal symptoms)    hypromellose-dextran (ARTIFICAL TEARS) ophthalmic solution 1 drop Place 1 drop into both eyes 3 times daily    eye wash (BSS PLUS) ophthalmic solution 15 mL Place 15 mLs into both eyes 2 times daily as needed (contact storage)    haloperidol (HALDOL) tablet 5 mg Take 1 tablet (5 mg) by mouth At Bedtime    mirtazapine (REMERON) tablet 15 mg Take 1 tablet (15 mg) by mouth At " "Bedtime    haloperidol (HALDOL) tablet 5 mg Take 1 tablet (5 mg) by mouth 3 times daily as needed for agitation    nicotine Patch in Place Place onto the skin every 8 hours    nicotine patch REMOVAL Place onto the skin daily    nicotine (NICODERM CQ) 21 MG/24HR 24 hr patch 1 patch Place 1 patch onto the skin daily    cloNIDine (CATAPRES) tablet 0.1 mg Take 1 tablet (0.1 mg) by mouth 2 times daily as needed (anxiety / methamphetamine withdrawal symptoms)    bacitracin ointment Apply topically 2 times daily as needed for wound care        10 point ROS positive for plugged ear       Allergies:     Allergies   Allergen Reactions     Cashews [Nuts] Swelling     Tongue, throat swelling            Psychiatric Examination:   /75  Pulse 66  Temp 97.1  F (36.2  C) (Tympanic)  Resp 14  Ht 1.803 m (5' 11\")  Wt 86.7 kg (191 lb 2.2 oz)  SpO2 96%  BMI 26.66 kg/m2  Weight is 191 lbs 2.22 oz  Body mass index is 26.66 kg/(m^2).    Appearance:  awake, alert, adequately groomed and dressed in hospital scrubs  Attitude:  cooperative  Eye Contact:  good  Mood:  Irritable/bored  Affect:  appropriate and in normal range and mood congruent  Speech:  clear, coherent  Psychomotor Behavior:  no evidence of tardive dyskinesia, dystonia, or tics  Thought Process:  logical, linear and goal oriented  Associations:  no loose associations  Thought Content:  no evidence of suicidal ideation or homicidal ideation and no evidence of psychotic thought  Insight:  good  Judgment:  intact  Oriented to:  time, person, and place  Attention Span and Concentration:  intact  Recent and Remote Memory:  intact  Fund of Knowledge: appropriate  Muscle Strength and Tone: normal  Gait and Station: Normal           Labs:   No labs today           Plan:   No medication changes today.     Updates on the treatment plan:    Symptoms that must improve before discharge: depression, irritability    Med changes targeting these symptoms: no changes today - working " on placement    Aftercare needs identified (Day Tx, CD Tx, IRTS, etc.): Locked MICD programming/Care Program    Expected discharge date: When placement is available for [facility]. Secondary to repeated relapses, elopement and failure to follow through with programming, the risk of discharging to a lower level of care is too high and will most certainly result in another relapse and rehospitalization if not further damage to person.

## 2017-06-02 NOTE — PLAN OF CARE
Face to face end of shift report received from Juli REY RN. Rounding completed. Patient observed in the [facility] area.     Isha Reddy  6/2/2017  8:00 AM

## 2017-06-02 NOTE — PLAN OF CARE
Problem: Goal Outcome Summary  Goal: Goal Outcome Summary  Outcome: Improving     Slept all night without issue

## 2017-06-02 NOTE — PLAN OF CARE
Face to face end of shift report received from gabrielle Vega . Rounding completed. Patient observed. Lying on right side - eyes closed - non-labored breathing noted.     Juli Farley  6/2/2017  1:09 AM

## 2017-06-02 NOTE — PLAN OF CARE
Face to face end of shift report received from Isha CANALES RN. Rounding completed. Patient observed sitting in day room watching television. Requests PRN for anxiety at this time.     Joan Hagen  6/2/2017  4:09 PM

## 2017-06-02 NOTE — PLAN OF CARE
"Problem: Goal Outcome Summary  Goal: Goal Outcome Summary  Outcome: Therapy, progress toward functional goals is gradual  Pt is hopeful to be accepted and waiting on a bed for [facility]. He reports feeling \"frustrated and bored\" today due to \"nothing going on here.\" He is encouraged to attend groups. Pt reports that the naltrexone has helped with his urges of swallowing inedible objects when upset. Will attempt to get pt to utilize alpha stimulation to decrease anxiety.     12:56 PM  Pt declined alpha stimulation.     Problem: Depression (Adult,Obstetrics,Pediatric)  Goal: Improved/Stable Mood  Patient will verbalize a decrease in depression by d/c.   Outcome: Improving  Pt offered no c/o depression today. He is up more in the later afternoon and walking in the halls. He denies any SI or thoughts of SIB.     Problem: Anxiety (Adult)  Goal: Reduction/Resolution  Patient will verbalize a decrease in anxiety by d/c.   Outcome: Therapy, progress toward functional goals is gradual  Pt reported frustration and anxiety. He requested and received prn haldol 5 mg at 1127. He states this medication has helped in the past.     Problem: Suicide Risk (Adult)  Goal: Strength-Based Wellness/Recovery  Patient will be able to verbalize at least 2 coping mechanisms by d/c.  Patient will attend at least 50% of groups daily.   Outcome: Therapy, progress toward functional goals is gradual  Pt did not verbalize coping skills or attend groups today. He reports being more \"frustrated and bored\" today.   Goal: Physical Safety  Patient will verbalize no SI each shift until d/c.   Outcome: Therapy, progress toward functional goals is gradual  Pt denies any SI or thoughts of SIB.       "

## 2017-06-02 NOTE — PLAN OF CARE
"Problem: Goal Outcome Summary  Goal: Goal Outcome Summary  Outcome: No Change  Patient cooperative with assessment. Denies SI/HI and hallucinations this shift. Reports depression rated 6/10 from \"being bored, being here and not knowing when I can leave\".   1554- Requested/Received PRN Haldol 5 mg for anxiety rated 7/10 d/t \"still being here\".   1653- Requested/Received PRN Tylenol 650 mg for generalized pain rated 6/10. Reports relief within the hour.   In day room watching television and walking hallways while listening to headphones. This writer did not observe patient responding to internal stimuli this shift. Patient exercised on treadmill and showered this shift. Compliant with scheduled medications. In bed resting by 2100 for remainder of shift.          Problem: Depression (Adult,Obstetrics,Pediatric)  Goal: Improved/Stable Mood  Patient will verbalize a decrease in depression by d/c.   Outcome: No Change  Continue to monitor at this time.     Problem: Anxiety (Adult)  Goal: Reduction/Resolution  Patient will verbalize a decrease in anxiety by d/c.   Outcome: No Change  Continue to monitor at this time.     Problem: Suicide Risk (Adult)  Goal: Strength-Based Wellness/Recovery  Patient will be able to verbalize at least 2 coping mechanisms by d/c.  Patient will attend at least 50% of groups daily.   Outcome: No Change  Continue to monitor at this time.   Goal: Physical Safety  Patient will verbalize no SI each shift until d/c.   Outcome: Improving  See above note.       "

## 2017-06-03 PROCEDURE — 25000132 ZZH RX MED GY IP 250 OP 250 PS 637: Performed by: NURSE PRACTITIONER

## 2017-06-03 PROCEDURE — 12400011

## 2017-06-03 RX ADMIN — Medication 25 MG: at 08:22

## 2017-06-03 RX ADMIN — GABAPENTIN 300 MG: 300 CAPSULE ORAL at 20:10

## 2017-06-03 RX ADMIN — Medication 3 DROP: at 20:11

## 2017-06-03 RX ADMIN — GABAPENTIN 300 MG: 300 CAPSULE ORAL at 08:22

## 2017-06-03 RX ADMIN — WATER 15 ML: 99.05 SOLUTION OPHTHALMIC at 14:22

## 2017-06-03 RX ADMIN — Medication 3 DROP: at 08:36

## 2017-06-03 RX ADMIN — HALOPERIDOL 5 MG: 5 TABLET ORAL at 12:47

## 2017-06-03 RX ADMIN — HYDROXYZINE HYDROCHLORIDE 50 MG: 25 TABLET ORAL at 15:38

## 2017-06-03 RX ADMIN — DEXTRAN 70, AND HYPROMELLOSE 2910 1 DROP: 1; 3 SOLUTION/ DROPS OPHTHALMIC at 09:00

## 2017-06-03 RX ADMIN — WATER 15 ML: 99.05 SOLUTION OPHTHALMIC at 14:17

## 2017-06-03 RX ADMIN — HALOPERIDOL 5 MG: 5 TABLET ORAL at 20:10

## 2017-06-03 RX ADMIN — NICOTINE 1 PATCH: 21 PATCH, EXTENDED RELEASE TRANSDERMAL at 08:22

## 2017-06-03 RX ADMIN — ACETAMINOPHEN 650 MG: 325 TABLET, FILM COATED ORAL at 15:42

## 2017-06-03 RX ADMIN — GABAPENTIN 300 MG: 300 CAPSULE ORAL at 14:16

## 2017-06-03 RX ADMIN — Medication 25 MG: at 20:09

## 2017-06-03 RX ADMIN — MIRTAZAPINE 15 MG: 15 TABLET, FILM COATED ORAL at 20:10

## 2017-06-03 ASSESSMENT — ACTIVITIES OF DAILY LIVING (ADL)
GROOMING: INDEPENDENT
DRESS: SCRUBS (BEHAVIORAL HEALTH)
ORAL_HYGIENE: INDEPENDENT

## 2017-06-03 NOTE — PLAN OF CARE
Face to face end of shift report received from Liana SMITH RN. Rounding completed. Patient observed in the [place], complained of being agitated and received 50mg Atarax.     Lissett Cervantes  6/3/2017  4:45 PM

## 2017-06-03 NOTE — PLAN OF CARE
Face to face end of shift report received from TOMEKA Franco. Rounding completed. Patient observed.     Ethan Cordero  6/3/2017  12:45 AM

## 2017-06-03 NOTE — PLAN OF CARE
Problem: Goal Outcome Summary  Goal: Goal Outcome Summary  Outcome: Therapy, progress toward functional goals is gradual  Pt isolative in room. He denies SI HI hallucinations depression and pain.  He states he has anxiety at times but doing ok now.  He is encouraged to participate in unit programming and is encouraged to shower.  Pt reports that he is eating and sleeping ok and he has no questions or concerns at this time.    1247:  Haldol 5 mg administered per his request for complaints of anxiety.     Pt asking when he will receive the Flush to his ears.  Writer informed that the drops are still scheduled for today.  Mar Message to pharmacy inquiring.     1330: pt in [id number] watching TV    Pt refused his 2pm eye drops.    Pt showered and linens changed this shift.        Problem: Depression (Adult,Obstetrics,Pediatric)  Goal: Improved/Stable Mood  Patient will verbalize a decrease in depression by d/c.   Outcome: Therapy, progress toward functional goals as expected  Pt denies depression at this time    Problem: Anxiety (Adult)  Goal: Reduction/Resolution  Patient will verbalize a decrease in anxiety by d/c.   Outcome: Therapy, progress towards functional goals is fair  Pt reports that he has intermittent anxiety. Comes and goes.     Problem: Suicide Risk (Adult)  Goal: Strength-Based Wellness/Recovery  Patient will be able to verbalize at least 2 coping mechanisms by d/c.  Patient will attend at least 50% of groups daily.   Outcome: Therapy, progress toward functional goals as expected  Pt does attend groups at times.  Goal: Physical Safety  Patient will verbalize no SI each shift until d/c.   Outcome: Therapy, progress toward functional goals is gradual  Pt denies SI at this time

## 2017-06-03 NOTE — PLAN OF CARE
Problem: Goal Outcome Summary  Goal: Goal Outcome Summary  2330--in bed with eyes closed and breathing regular. 0614--still in bed with eyes closed and breathing regular.

## 2017-06-03 NOTE — PLAN OF CARE
Face to face end of shift report received from Amarjit RN. Rounding completed. Patient observed in bed.     Liana Samuels  6/3/2017  7:46 AM

## 2017-06-04 PROCEDURE — 25000132 ZZH RX MED GY IP 250 OP 250 PS 637: Performed by: NURSE PRACTITIONER

## 2017-06-04 PROCEDURE — 12400011

## 2017-06-04 RX ADMIN — HALOPERIDOL 5 MG: 5 TABLET ORAL at 20:18

## 2017-06-04 RX ADMIN — GABAPENTIN 300 MG: 300 CAPSULE ORAL at 14:13

## 2017-06-04 RX ADMIN — Medication 25 MG: at 08:41

## 2017-06-04 RX ADMIN — GABAPENTIN 300 MG: 300 CAPSULE ORAL at 08:41

## 2017-06-04 RX ADMIN — MIRTAZAPINE 15 MG: 15 TABLET, FILM COATED ORAL at 20:18

## 2017-06-04 RX ADMIN — Medication 25 MG: at 20:17

## 2017-06-04 RX ADMIN — NICOTINE 1 PATCH: 21 PATCH, EXTENDED RELEASE TRANSDERMAL at 08:44

## 2017-06-04 RX ADMIN — Medication 3 DROP: at 08:43

## 2017-06-04 RX ADMIN — GABAPENTIN 300 MG: 300 CAPSULE ORAL at 20:17

## 2017-06-04 NOTE — PLAN OF CARE
Face to face end of shift report received from Joan Roberts. Rounding completed. Patient observed in [facility].     Liana Samuels  6/4/2017  7:43 AM

## 2017-06-04 NOTE — PLAN OF CARE
Problem: Goal Outcome Summary  Goal: Goal Outcome Summary  Outcome: Therapy, progress towards functional goals is fair  Pt denies SI HI hallucinations depression anxiety and pain.  Writer talked with him abut his ear.  Inquired if the drops have been helping. He stated no the ear is still the same and my hearing is still muffled.  He is encouraged to participate in unit programming.  He is encouraged to shower. He reports that he is sleeping ok, appetite is good. No questions or concerns at this time.     Spoke to pharmacy about obtaining a Ear Flush Kit, she states they don't have kits and just flush with NS.      House supervisor contacted to obtain a Otoscope to assess pt ear.     1400: Ear's flushed with positive results.    Problem: Depression (Adult,Obstetrics,Pediatric)  Goal: Improved/Stable Mood  Patient will verbalize a decrease in depression by d/c.   Outcome: Therapy, progress towards functional goals is fair  Denies Depression at this time    Problem: Anxiety (Adult)  Goal: Reduction/Resolution  Patient will verbalize a decrease in anxiety by d/c.   Outcome: Therapy, progress toward functional goals is gradual  Pt denies anxiety at this time.    Problem: Suicide Risk (Adult)  Goal: Strength-Based Wellness/Recovery  Patient will be able to verbalize at least 2 coping mechanisms by d/c.  Patient will attend at least 50% of groups daily.   Outcome: Therapy, progress towards functional goals is fair  Pt encouraged to participate in unit programming  Goal: Physical Safety  Patient will verbalize no SI each shift until d/c.   Pt denies SI at this time

## 2017-06-04 NOTE — PLAN OF CARE
"Problem: Goal Outcome Summary  Goal: Goal Outcome Summary  Outcome: No Change  Patient stated he needed something for anxiety. He said he felt uncomfortable around his room mate and felt like he wanted to hit him. This writer offered him 50mg Atarax at 15:38. Then he was asked to explain what was the situation. Patient explained his back was hurting and he asked his roommate to \"walk on my back.\" The roommate said no and patient said this pissed him off so \"I called him a bitch.\" Patient then began to talk about how he was feeling irritable from being inpatient for so long. He said he was going to try to use his coping skills. Patient then played cards with staff. Later in group, patient made a comment that he wanted to do something so he could just take a shot and go to sleep. Patient did rate his pain at 6 of 10 and was given 650mg tylenol at 15:42. He remained calm for the rest of the time and attended groups. He did pace at times but asked for his meds and then went to bed.     Problem: Depression (Adult,Obstetrics,Pediatric)  Goal: Improved/Stable Mood  Patient will verbalize a decrease in depression by d/c.   Outcome: No Change  Patient endorsed both anxiety and depression.       "

## 2017-06-04 NOTE — PLAN OF CARE
Problem: Goal Outcome Summary  Goal: Goal Outcome Summary  Outcome: No Change  Patient in bed with eyes closed all night, non-labored breathing, position changes noted. No concerns at this time.

## 2017-06-04 NOTE — PLAN OF CARE
Problem: Goal Outcome Summary  Goal: Goal Outcome Summary  Outcome: No Change  Patient endorsed anxiety and depression. He denied SI/HI and hallucinations. He denied any concerns other being bored. He was offered many options. He did attend groups. Patient asked to talk to a provider about ADHD medication that he said he was on in the past. He has been calm and cooperative. Less irritable than yesterday. He denied pain.

## 2017-06-04 NOTE — PLAN OF CARE
Face to face end of shift report received from Lissett FIERRO RN. Rounding completed. Patient observed laying in bed with eyes closed, left side, non-labored breathing.     Joan Hagen  6/4/2017  12:54 AM

## 2017-06-04 NOTE — PLAN OF CARE
Face to face end of shift report received from Liana SMITH RN. Rounding completed. Patient observed in the lounge.     Lissett Cervantes  6/4/2017  3:52 PM

## 2017-06-05 PROCEDURE — 99232 SBSQ HOSP IP/OBS MODERATE 35: CPT | Performed by: NURSE PRACTITIONER

## 2017-06-05 PROCEDURE — 40000133 ZZH STATISTIC OT WARD VISIT

## 2017-06-05 PROCEDURE — 25000132 ZZH RX MED GY IP 250 OP 250 PS 637: Performed by: NURSE PRACTITIONER

## 2017-06-05 PROCEDURE — 12400011

## 2017-06-05 PROCEDURE — 97530 THERAPEUTIC ACTIVITIES: CPT | Mod: GO

## 2017-06-05 RX ORDER — BUPROPION HYDROCHLORIDE 150 MG/1
150 TABLET ORAL DAILY
Status: DISCONTINUED | OUTPATIENT
Start: 2017-06-05 | End: 2017-06-15 | Stop reason: HOSPADM

## 2017-06-05 RX ADMIN — GABAPENTIN 300 MG: 300 CAPSULE ORAL at 08:30

## 2017-06-05 RX ADMIN — Medication 25 MG: at 20:14

## 2017-06-05 RX ADMIN — NICOTINE 1 PATCH: 21 PATCH, EXTENDED RELEASE TRANSDERMAL at 08:30

## 2017-06-05 RX ADMIN — Medication 25 MG: at 08:30

## 2017-06-05 RX ADMIN — GABAPENTIN 300 MG: 300 CAPSULE ORAL at 20:14

## 2017-06-05 RX ADMIN — HALOPERIDOL 5 MG: 5 TABLET ORAL at 20:14

## 2017-06-05 RX ADMIN — BUPROPION HYDROCHLORIDE 150 MG: 150 TABLET, FILM COATED, EXTENDED RELEASE ORAL at 14:09

## 2017-06-05 RX ADMIN — GABAPENTIN 300 MG: 300 CAPSULE ORAL at 14:09

## 2017-06-05 RX ADMIN — MIRTAZAPINE 15 MG: 15 TABLET, FILM COATED ORAL at 20:14

## 2017-06-05 ASSESSMENT — ACTIVITIES OF DAILY LIVING (ADL)
DRESS: SCRUBS (BEHAVIORAL HEALTH);INDEPENDENT
GROOMING: INDEPENDENT
ORAL_HYGIENE: INDEPENDENT

## 2017-06-05 NOTE — PLAN OF CARE
Problem: Goal Outcome Summary  Goal: Goal Outcome Summary  OT treatment: Pt. made a bean bag for tapping for deep pressure input for calming/self regulation. Instructed and practiced non-tool based sensory activities and handout given. Patient notes that he feels good completing this activity. Discussed practicing these sensory based strategies for self regulation so that they become a part of his routine. Plan is for 1 more treatment session and then DC.

## 2017-06-05 NOTE — PROGRESS NOTES
[facility]  Psychiatric Progress Note      Impression:   Janusz Escalera is a 46 year old male who presented via [facility] ED with reports of 2 suicide attempts the day of admission. Apparently attempted to hang himself both times while in the woods. Notes indicate that he blacked out after the first attempt and woke with a bloody nose, freed himself and called his sister to bring to the ED. Second attempt details are unclear. Recent Meth use. History of SIB by swallowing foreign objects. CT of neck and xray of abdomen completed with no abnormalities. Sister reported that he will often voluntarily admit self to a facility and then leave. He was placed on a hold.     Patient has been admitted 14 days and still meets hospital inpatient criteria.    Improved mood today. Reports ongoing anxiety that he finds bothersome. Discusses childhood diagnosis of ADHD and non-stimulant medications that have worked for him in the past. Feels his residual anxiety may be related to untreated ADHD symptoms or exacerbation of symptoms secondary to drug use. Agreed to a trial of Wellbutrin XL, which has worked for him in the past. Sleeping and eating well. Depression improving. Denies SI/HI or psychosis.     Educated regarding medication indications, possible s/e, risks, benefits and contraindications. Verbalized understanding.        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          Medications:   I have reviewed this patient's current  "medications  Prescription Medications as of 6/5/2017             HALOPERIDOL PO Take 5 mg by mouth At Bedtime    Mirtazapine (REMERON PO) Take 30 mg by mouth At Bedtime    TRAZODONE HCL PO Take 100 mg by mouth At Bedtime      Facility Administered Medications as of 6/5/2017             buPROPion (WELLBUTRIN XL) 24 hr tablet 150 mg Take 1 tablet (150 mg) by mouth daily    carbamide peroxide (DEBROX) 6.5 % otic solution 3 drop (Discontinued) Place 3 drops into both ears 2 times daily    gabapentin (NEURONTIN) capsule 300 mg Take 1 capsule (300 mg) by mouth 3 times daily    naltrexone (DEPADE;REVIA) tablet 25 mg Take 1 half-tab (25 mg) by mouth 2 times daily    traZODone (DESYREL) tablet 100 mg Take 1 tablet (100 mg) by mouth nightly as needed for sleep    hydrOXYzine (ATARAX) tablet 25-50 mg Take 1-2 tablets (25-50 mg) by mouth every 4 hours as needed for anxiety    acetaminophen (TYLENOL) tablet 650 mg Take 2 tablets (650 mg) by mouth every 4 hours as needed for mild pain    alum & mag hydroxide-simethicone (MYLANTA ES/MAALOX  ES) suspension 30 mL Take 30 mLs by mouth every 4 hours as needed for indigestion    magnesium hydroxide (MILK OF MAGNESIA) suspension 30 mL Take 30 mLs by mouth nightly as needed for constipation    bisacodyl (DULCOLAX) Suppository 10 mg Place 1 suppository (10 mg) rectally daily as needed for constipation    OLANZapine (zyPREXA) tablet 10 mg Take 1 tablet (10 mg) by mouth every 2 hours as needed for agitation (associated with psychosis or rufus)    Linked Group 1:  \"Or\" Linked Group Details     OLANZapine (zyPREXA) injection 10 mg Inject 10 mg into the muscle every 2 hours as needed for agitation (associated with psychosis or rufus)    Linked Group 1:  \"Or\" Linked Group Details     nicotine polacrilex (NICORETTE) gum 2-4 mg Place 1-2 each (2-4 mg) inside cheek every hour as needed for other (nicotine withdrawal symptoms)    hypromellose-dextran (ARTIFICAL TEARS) ophthalmic solution 1 drop " "Place 1 drop into both eyes 3 times daily    eye wash (BSS PLUS) ophthalmic solution 15 mL Place 15 mLs into both eyes 2 times daily as needed (contact storage)    haloperidol (HALDOL) tablet 5 mg Take 1 tablet (5 mg) by mouth At Bedtime    mirtazapine (REMERON) tablet 15 mg Take 1 tablet (15 mg) by mouth At Bedtime    haloperidol (HALDOL) tablet 5 mg Take 1 tablet (5 mg) by mouth 3 times daily as needed for agitation    nicotine Patch in Place Place onto the skin every 8 hours    nicotine patch REMOVAL Place onto the skin daily    nicotine (NICODERM CQ) 21 MG/24HR 24 hr patch 1 patch Place 1 patch onto the skin daily    cloNIDine (CATAPRES) tablet 0.1 mg Take 1 tablet (0.1 mg) by mouth 2 times daily as needed (anxiety / methamphetamine withdrawal symptoms)    bacitracin ointment Apply topically 2 times daily as needed for wound care        10 point ROS reviewed and positive for anxiety.        Allergies:     Allergies   Allergen Reactions     Cashews [Nuts] Swelling     Tongue, throat swelling            Psychiatric Examination:   /73  Pulse 66  Temp 98.1  F (36.7  C) (Tympanic)  Resp 14  Ht 1.803 m (5' 11\")  Wt 87.5 kg (193 lb)  SpO2 97%  BMI 26.92 kg/m2  Weight is 193 lbs 0 oz  Body mass index is 26.92 kg/(m^2).    Appearance:  awake, alert and adequately groomed  Attitude:  cooperative  Eye Contact:  good  Mood:  better  Affect:  mood congruent  Speech:  clear, coherent  Psychomotor Behavior:  no evidence of tardive dyskinesia, dystonia, or tics  Thought Process:  logical, linear and goal oriented  Associations:  no loose associations  Thought Content:  no evidence of suicidal ideation or homicidal ideation and no evidence of psychotic thought  Insight:  good  Judgment:  intact  Oriented to:  time, person, and place  Attention Span and Concentration:  intact  Recent and Remote Memory:  intact  Fund of Knowledge: appropriate  Muscle Strength and Tone: normal  Gait and Station: Normal           Labs: "   No labs today           Plan:   Start Wellbutrin XL 150mg daily.     Updates on the treatment plan:    Symptoms that must improve before discharge: depression, irritability, anxiety    Med changes targeting these symptoms: Wellbutrin XL 150mg daily started - will increase as tolerated.     Aftercare needs identified (Day Tx, CD Tx, IRTS, etc.): Locked MICD programming/Care Program    Expected discharge date: When placement is available for [facility]. Secondary to repeated relapses, elopement and failure to follow through with programming, the risk of discharging to a lower level of care is too high and will most certainly result in another relapse and rehospitalization if not further damage to person.

## 2017-06-05 NOTE — PLAN OF CARE
Patient has been in bed with eyes closed and regular respirations observed. 15 minute and PRN checks performed all night. No complaints offered. Nicotine patch in place. Patient slept about 8 hours so far this shift.       Jaqueline Underwood  6/5/2017  6:47 AM

## 2017-06-05 NOTE — PLAN OF CARE
Problem: Discharge Planning  Goal: Discharge Planning (Adult, OB, Behavioral, Peds)  Writer met with pt and informed him that we called [facility] this morning and he is currently 6th on the list and they also called requesting notes which typically indicates they are getting close to taking someone- pt was pleased with this news.  Denied any other needs.

## 2017-06-05 NOTE — PLAN OF CARE
Problem: Discharge Planning  Goal: Discharge Planning (Adult, OB, Behavioral, Peds)  Outcome: No Change  Called [facility] to see where the patient was on the list. Malissa stated that he was 6th on the list for [place] [facility] and that he was also okayed for [facility].

## 2017-06-05 NOTE — PLAN OF CARE
Face to face end of shift report received from Liana ANTHONY RN. Rounding completed. Patient observed in the lounge.     Amee Dillard  6/5/2017  4:07 PM

## 2017-06-05 NOTE — PLAN OF CARE
Problem: Goal Outcome Summary  Goal: Goal Outcome Summary  Outcome: Therapy, progress toward functional goals as expected  Pt is pleasant and cooperative compliant with medications.  Wellbutrin  Is a new order compliant with.  Pt is 6th on the [facility] Care program.  He is encouraged to participate in unit programming.  Pt showered this shift.        Problem: Anxiety (Adult)  Goal: Reduction/Resolution  Patient will verbalize a decrease in anxiety by d/c.   Patient will try one coping skill before using a PRN.   Outcome: Therapy, progress toward functional goals as expected  Pt denies anxiety at this time.      Problem: Additional Goals: Nursing Focus  Goal: 1. Patient Goal: Nursing Focus  Patient has a history of swallowing objects prior to hospitalization.   Patient will not self harm by swallowing objects while inpatient.   Outcome: Therapy, progress toward functional goals as expected  Pt has no thoughts of self harm at this time.

## 2017-06-05 NOTE — PLAN OF CARE
Face to face end of shift report received from [facility] . Rounding completed. Patient observed in [facility].     Liana Samuels  6/5/2017  8:15 AM

## 2017-06-05 NOTE — PLAN OF CARE
Problem: Discharge Planning  Goal: Discharge Planning (Adult, OB, Behavioral, Peds)  Outcome: No Change  MAR, labs, and notes sent to [facility] as requested.

## 2017-06-05 NOTE — PLAN OF CARE
Face to face end of shift report received from TOMEKA Galeana. Rounding completed. Patient observed in bed.    Jaqueline Underwood  6/4/2017  11:42 PM

## 2017-06-06 PROCEDURE — 25000132 ZZH RX MED GY IP 250 OP 250 PS 637: Performed by: NURSE PRACTITIONER

## 2017-06-06 PROCEDURE — 12400011

## 2017-06-06 RX ADMIN — HALOPERIDOL 5 MG: 5 TABLET ORAL at 20:18

## 2017-06-06 RX ADMIN — NICOTINE 1 PATCH: 21 PATCH, EXTENDED RELEASE TRANSDERMAL at 08:26

## 2017-06-06 RX ADMIN — WATER 15 ML: 99.05 SOLUTION OPHTHALMIC at 08:29

## 2017-06-06 RX ADMIN — GABAPENTIN 300 MG: 300 CAPSULE ORAL at 08:25

## 2017-06-06 RX ADMIN — Medication 25 MG: at 08:25

## 2017-06-06 RX ADMIN — MIRTAZAPINE 15 MG: 15 TABLET, FILM COATED ORAL at 20:18

## 2017-06-06 RX ADMIN — BUPROPION HYDROCHLORIDE 150 MG: 150 TABLET, FILM COATED, EXTENDED RELEASE ORAL at 08:25

## 2017-06-06 RX ADMIN — ACETAMINOPHEN 650 MG: 325 TABLET, FILM COATED ORAL at 19:17

## 2017-06-06 RX ADMIN — GABAPENTIN 300 MG: 300 CAPSULE ORAL at 20:18

## 2017-06-06 RX ADMIN — DEXTRAN 70, AND HYPROMELLOSE 2910 1 DROP: 1; 3 SOLUTION/ DROPS OPHTHALMIC at 14:31

## 2017-06-06 RX ADMIN — Medication 25 MG: at 20:18

## 2017-06-06 RX ADMIN — GABAPENTIN 300 MG: 300 CAPSULE ORAL at 14:31

## 2017-06-06 ASSESSMENT — ACTIVITIES OF DAILY LIVING (ADL)
DRESS: SCRUBS (BEHAVIORAL HEALTH);INDEPENDENT
ORAL_HYGIENE: INDEPENDENT
GROOMING: INDEPENDENT

## 2017-06-06 NOTE — PLAN OF CARE
Problem: Goal Outcome Summary  Goal: Goal Outcome Summary  Outcome: Improving  Patient was calm and cooperative with this writer. He denied anxiety, depression, hallucinations, suicidal ideation, and homicidal ideation. He verbalized that he is pleased that he is 6th on the wait-list for [facility]. He was medication compliant. He attended creativity group tonight.     Problem: Anxiety (Adult)  Goal: Reduction/Resolution  Patient will verbalize a decrease in anxiety by d/c.   Patient will try one coping skill before using a PRN.   Outcome: Improving  Patient denied anxiety this shift.     Problem: Additional Goals: Nursing Focus  Goal: 1. Patient Goal: Nursing Focus  Patient has a history of swallowing objects prior to hospitalization.   Patient will not self harm by swallowing objects while inpatient.   Outcome: Improving  Patient remained free from self harm.

## 2017-06-06 NOTE — PLAN OF CARE
Problem: Goal Outcome Summary  Goal: Goal Outcome Summary  Pt appears to be sleeping in bed with eyes closed and regular respirations. 15 minutes and PRN safety checks completed with no noted complains. Will continue to monitor.      JÚNIOR IBANEZ  6/6/2017  5:10 AM

## 2017-06-06 NOTE — PLAN OF CARE
Face to face end of shift report received from Mary ECKERT. Rounding completed. Patient observed in bed.     Liana Samuels  6/6/2017  7:40 AM

## 2017-06-06 NOTE — PLAN OF CARE
Problem: Goal Outcome Summary  Goal: Goal Outcome Summary  Outcome: Therapy, progress toward functional goals as expected  Pt denies SI HI hallucinations depression anxiety and pain. Pt pleasant and cooperative. Compliant with medications.  He denies problems with bowel and bladder. Appetite is good and he states he is sleeping ok.  Pt is encouraged to participate in unit programming. He is encouraged to shower. Supplies provided.      Problem: Anxiety (Adult)  Goal: Reduction/Resolution  Patient will verbalize a decrease in anxiety by d/c.   Patient will try one coping skill before using a PRN.   Pt denies anxiety at this time. He verbalizes that he likes music and rest for coping skills    Problem: Additional Goals: Nursing Focus  Goal: 1. Patient Goal: Nursing Focus  Patient has a history of swallowing objects prior to hospitalization.   Patient will not self harm by swallowing objects while inpatient.   Outcome: Therapy, progress toward functional goals as expected  Pt has no thoughts of self injury at this time

## 2017-06-06 NOTE — PLAN OF CARE
Face to face end of shift report received from Liana ANTHONY RN. Rounding completed. Patient observed in the lounge.     Amee Dillard  6/6/2017  4:00 PM

## 2017-06-06 NOTE — PLAN OF CARE
Face to face end of shift report received from TOMEKA Lubin. Rounding completed. Patient observed resting in bed.     JÚNIOR IBANEZ  6/5/2017  11:51 PM

## 2017-06-07 PROCEDURE — 12400011

## 2017-06-07 PROCEDURE — 25000132 ZZH RX MED GY IP 250 OP 250 PS 637: Performed by: NURSE PRACTITIONER

## 2017-06-07 RX ADMIN — BUPROPION HYDROCHLORIDE 150 MG: 150 TABLET, FILM COATED, EXTENDED RELEASE ORAL at 08:38

## 2017-06-07 RX ADMIN — GABAPENTIN 300 MG: 300 CAPSULE ORAL at 14:31

## 2017-06-07 RX ADMIN — HALOPERIDOL 5 MG: 5 TABLET ORAL at 20:35

## 2017-06-07 RX ADMIN — Medication 25 MG: at 20:35

## 2017-06-07 RX ADMIN — DEXTRAN 70, AND HYPROMELLOSE 2910 1 DROP: 1; 3 SOLUTION/ DROPS OPHTHALMIC at 20:36

## 2017-06-07 RX ADMIN — DEXTRAN 70, AND HYPROMELLOSE 2910 1 DROP: 1; 3 SOLUTION/ DROPS OPHTHALMIC at 14:31

## 2017-06-07 RX ADMIN — DEXTRAN 70, AND HYPROMELLOSE 2910 1 DROP: 1; 3 SOLUTION/ DROPS OPHTHALMIC at 08:42

## 2017-06-07 RX ADMIN — Medication 25 MG: at 08:38

## 2017-06-07 RX ADMIN — GABAPENTIN 300 MG: 300 CAPSULE ORAL at 08:38

## 2017-06-07 RX ADMIN — MIRTAZAPINE 15 MG: 15 TABLET, FILM COATED ORAL at 20:35

## 2017-06-07 RX ADMIN — NICOTINE POLACRILEX 4 MG: 2 GUM, CHEWING ORAL at 19:01

## 2017-06-07 RX ADMIN — NICOTINE 1 PATCH: 21 PATCH, EXTENDED RELEASE TRANSDERMAL at 08:38

## 2017-06-07 RX ADMIN — GABAPENTIN 300 MG: 300 CAPSULE ORAL at 20:35

## 2017-06-07 ASSESSMENT — ACTIVITIES OF DAILY LIVING (ADL)
DRESS: SCRUBS (BEHAVIORAL HEALTH);INDEPENDENT
LAUNDRY: UNABLE TO COMPLETE
ORAL_HYGIENE: INDEPENDENT
GROOMING: INDEPENDENT

## 2017-06-07 NOTE — PLAN OF CARE
Face to face end of shift report received from Liana SMITH RN. Rounding completed. Patient observed in [facility].    Leslieraymond Meza  6/7/2017  4:06 PM

## 2017-06-07 NOTE — PLAN OF CARE
Treatment team care conference held 06/07/17    Attendees:Nursing, , Occupational Therapy, NP, Manager/Supervisor and Discharge Planner    MD/NP: Patient has been admitted 17 days and still meets hospital inpatient criteria.    RN/Charge Nurse:     Restraint/seclusion, disruptive unsafe behavior: No    Symptoms progress (SI and SIB urges, AH, etc.): Patient symptoms are resolving.    Sleep, nutrition, significant weight change: no recent changes    PRN use/results, side effects, labs, medical issues needing attention: Yes: prn atarax po usage for intermittent complaints of anxiety.     Review DC checklist is discharge occurring within 48 hours: No    Review follow up medical appointments needed: No, pt won't have any as he will discharge to another facility.

## 2017-06-07 NOTE — PLAN OF CARE
Problem: Goal Outcome Summary  Goal: Goal Outcome Summary  Outcome: Therapy, progress toward functional goals as expected  Pt denies SI HI Hallucinations depression anxiety and pain.  He is compliant with medications.  He has no questions or concerns about his treatment plan, just glad to be going treatment soon. He has no problems with bowel or bladder. Reports sleeping well. He is encouraged to participate in unit programming.       [facility] called and writer gave them update on questions they had.     Pt in Group and is using the Alpha stim to help manage his mental health symptoms    Problem: Anxiety (Adult)  Goal: Reduction/Resolution  Patient will verbalize a decrease in anxiety by d/c.   Patient will try one coping skill before using a PRN.   Outcome: Therapy, progress toward functional goals as expected  Pt denies Anxiety at this time. He also verbalized exercising as a coping skill    Problem: Additional Goals: Nursing Focus  Goal: 1. Patient Goal: Nursing Focus  Patient has a history of swallowing objects prior to hospitalization.   Patient will not self harm by swallowing objects while inpatient.   Outcome: Therapy, progress toward functional goals as expected  No thoughts of self harm this shift. Pt is pleasant and cooperative

## 2017-06-07 NOTE — PLAN OF CARE
Face to face end of shift report received from TOMEKA Lubin. Rounding completed. Patient observed resting in bed.     JÚNIOR IBANEZ  6/7/2017  12:14 AM

## 2017-06-07 NOTE — PLAN OF CARE
Problem: Goal Outcome Summary  Goal: Goal Outcome Summary  Pt denies SI, HI, hallucinations, anxiety, depression or pain. He has been pleasant and cooperative. Out in the lounge socializing with peers this shift. Encouraged to participate in groups. Pt has been out walking the hallway and listening to head phones.     Problem: Anxiety (Adult)  Goal: Reduction/Resolution  Patient will verbalize a decrease in anxiety by d/c.   Patient will try one coping skill before using a PRN.   Pt denies anxiety at this time. Pt verbalizes that exercise as a coping skill.    Problem: Additional Goals: Nursing Focus  Goal: 1. Patient Goal: Nursing Focus  Patient has a history of swallowing objects prior to hospitalization.   Patient will not self harm by swallowing objects while inpatient.   Pt has been free from harm this shift.

## 2017-06-07 NOTE — PLAN OF CARE
"Problem: Discharge Planning  Goal: Discharge Planning (Adult, OB, Behavioral, Peds)  Writer received a message that [facility] had read pt's notes from prior Saturday and felt that he was not stable enough to go to the [facility] program and they had spoken with pt's  Flaco WADSWORTH [phone] and switched pt to the [facility]. Writer called Flaco who stated that [facility] indicated that pt would be placed faster if he were on the [facility] list and he could go there to stabilize and then transfer to the CARE program.  Flaco spoke with pt on the phone and explained the situation and pt stated he would be okay with going to the [facility] to \"get out of the hospital until he could get into the CARE program.  Writer called [facility] and explained the incident last Saturday- that pt handled it approprietly by come to staff and asking for something for anxiety and that he has been very stable and attending groups.     Social work team: Provide updates including:    Legal status (need to sign in, petition for commitment, etc.): Pt is currently under a MICD commitment    Collateral history from family, , etc.  Flaco WADSWORTH With [facility]    Progress toward ideal aftercare plan: Significant progress    Follow up medical appointments made and added to AVS: No- going to [facility] or CARE program    Obstacles to Discharge: Psychiatric Illness and Chemical Dependency  "

## 2017-06-07 NOTE — PLAN OF CARE
Problem: Discharge Planning  Goal: Discharge Planning (Adult, OB, Behavioral, Peds)  Outcome: No Change  Sent updated notes and MAR to [facility] as requested.

## 2017-06-07 NOTE — PLAN OF CARE
Problem: Goal Outcome Summary  Goal: Goal Outcome Summary  Outcome: Improving  Pt was calm and cooperative and up on the unit. He denied anxiety, depression, hallucinations, HI, and SI. He was medication compliant. He was given PRN Tylenol at 1917 for c/o a headache. Relief noted upon re-assessment of pain.     Problem: Anxiety (Adult)  Goal: Reduction/Resolution  Patient will verbalize a decrease in anxiety by d/c.   Patient will try one coping skill before using a PRN.   Outcome: Improving  Pt denied anxiety.     Problem: Additional Goals: Nursing Focus  Goal: 1. Patient Goal: Nursing Focus  Patient has a history of swallowing objects prior to hospitalization.   Patient will not self harm by swallowing objects while inpatient.   Outcome: Improving  Pt remained free from self harm.

## 2017-06-07 NOTE — PLAN OF CARE
Face to face end of shift report received from Mary Roberts. Rounding completed. Patient observed in bed.     Liana Samuels  6/7/2017  7:36 AM

## 2017-06-07 NOTE — PLAN OF CARE
Problem: Goal Outcome Summary  Goal: Goal Outcome Summary  Pt appeared to be sleeping in bed with eyes closed and regular respirations. 15 minutes and PRN safety checks completed with no noted complains. Will continue to monitor.      JÚNIOR IBANEZ  6/7/2017  5:23 AM

## 2017-06-07 NOTE — PLAN OF CARE
Problem: Goal Outcome Summary  Goal: Goal Outcome Summary  Treatment team care conference held 06/07/17     Vj PIÑA: Gives report including:    Group attendance: Yes: appropriate in groups.    Madeleine observation: Yes: calm and cooperative.    Interactions with others: Yes:    Observed symptoms: cooperative and pleasant

## 2017-06-08 LAB — GLUCOSE BLDC GLUCOMTR-MCNC: 94 MG/DL (ref 70–99)

## 2017-06-08 PROCEDURE — 97530 THERAPEUTIC ACTIVITIES: CPT | Mod: GO

## 2017-06-08 PROCEDURE — 00000146 ZZHCL STATISTIC GLUCOSE BY METER IP

## 2017-06-08 PROCEDURE — 25000132 ZZH RX MED GY IP 250 OP 250 PS 637: Performed by: NURSE PRACTITIONER

## 2017-06-08 PROCEDURE — 12400011

## 2017-06-08 PROCEDURE — 40000133 ZZH STATISTIC OT WARD VISIT

## 2017-06-08 PROCEDURE — 99232 SBSQ HOSP IP/OBS MODERATE 35: CPT | Performed by: NURSE PRACTITIONER

## 2017-06-08 RX ORDER — MIRTAZAPINE 30 MG/1
30 TABLET, FILM COATED ORAL AT BEDTIME
Status: DISCONTINUED | OUTPATIENT
Start: 2017-06-08 | End: 2017-06-15 | Stop reason: HOSPADM

## 2017-06-08 RX ADMIN — Medication 25 MG: at 20:38

## 2017-06-08 RX ADMIN — BUPROPION HYDROCHLORIDE 150 MG: 150 TABLET, FILM COATED, EXTENDED RELEASE ORAL at 08:16

## 2017-06-08 RX ADMIN — HALOPERIDOL 5 MG: 5 TABLET ORAL at 11:27

## 2017-06-08 RX ADMIN — SALINE NASAL SPRAY 1 SPRAY: 1.5 SOLUTION NASAL at 13:34

## 2017-06-08 RX ADMIN — GABAPENTIN 300 MG: 300 CAPSULE ORAL at 13:34

## 2017-06-08 RX ADMIN — HALOPERIDOL 5 MG: 5 TABLET ORAL at 20:35

## 2017-06-08 RX ADMIN — GABAPENTIN 300 MG: 300 CAPSULE ORAL at 20:35

## 2017-06-08 RX ADMIN — MIRTAZAPINE 30 MG: 30 TABLET, FILM COATED ORAL at 20:34

## 2017-06-08 RX ADMIN — NICOTINE POLACRILEX 4 MG: 2 GUM, CHEWING ORAL at 18:39

## 2017-06-08 RX ADMIN — ACETAMINOPHEN 650 MG: 325 TABLET, FILM COATED ORAL at 20:34

## 2017-06-08 RX ADMIN — DEXTRAN 70, AND HYPROMELLOSE 2910 1 DROP: 1; 3 SOLUTION/ DROPS OPHTHALMIC at 13:34

## 2017-06-08 RX ADMIN — ACETAMINOPHEN 650 MG: 325 TABLET, FILM COATED ORAL at 16:20

## 2017-06-08 RX ADMIN — Medication 25 MG: at 08:16

## 2017-06-08 RX ADMIN — NICOTINE 1 PATCH: 21 PATCH, EXTENDED RELEASE TRANSDERMAL at 08:13

## 2017-06-08 RX ADMIN — GABAPENTIN 300 MG: 300 CAPSULE ORAL at 08:16

## 2017-06-08 RX ADMIN — DEXTRAN 70, AND HYPROMELLOSE 2910 1 DROP: 1; 3 SOLUTION/ DROPS OPHTHALMIC at 08:15

## 2017-06-08 ASSESSMENT — ACTIVITIES OF DAILY LIVING (ADL)
GROOMING: INDEPENDENT
ORAL_HYGIENE: INDEPENDENT
DRESS: SCRUBS (BEHAVIORAL HEALTH)

## 2017-06-08 NOTE — PLAN OF CARE
Problem: Goal Outcome Summary  Goal: Goal Outcome Summary  Occupational Therapy Discharge Summary     Reason for therapy discharge:    All goals and outcomes met, no further needs identified.     Progress towards therapy goal(s). See goals on Care Plan in [facility] electronic health record for goal details.  Goals met     Therapy recommendation(s):    No further therapy is recommended.   Treatment included discussion of mindfulness and mindful yoga. Provided literature on mindfulness and yoga. Practiced mindful yoga and discussed deep breathing and educated on modifications of poses. Patient notes that 'this is just what he needed.' Discussed with patient regarding DC and if he does have any questions to feel free to ask OT staff.

## 2017-06-08 NOTE — PLAN OF CARE
Face to face end of shift report received from Leslie ECKERT. Rounding completed. Patient observed in bed.     Dottie Mann  6/7/2017  11:30 PM

## 2017-06-08 NOTE — PLAN OF CARE
Problem: Discharge Planning  Goal: Discharge Planning (Adult, OB, Behavioral, Peds)  Pt was wondering where he is on the list for [facility]- writer called and was told he is 13th on the list but if his  calls to request it- pt can be put on all the lists. Writer called pt's  Flaco Sharma [phone] and left a message requesting that he call [facility] and request pt be put on all the [facility] lists.     Social work team: Provide updates including:    Legal status (need to sign in, petition for commitment, etc.): Pt is currently under a MICD commitment    Collateral history from family, , etc.: Per  Flaco Sharma    Progress toward ideal aftercare plan: Significant progress    Follow up medical appointments made and added to AVS: No    Obstacles to Discharge: Substance use/abuse  [facility] called to inquire about pt's Heppititis C status- writer called back and confirmed pt has a diagnosis of Hep. C

## 2017-06-08 NOTE — PLAN OF CARE
Face to face end of shift report received from Dottie ECKERT. Rounding completed. Patient observed in bed.     Liana Samuels  6/8/2017  7:41 AM

## 2017-06-08 NOTE — PLAN OF CARE
Face to face end of shift report received from Liana RN Rounding completed. Patient observed.     Marium Gomes  6/8/2017  1600

## 2017-06-08 NOTE — PROGRESS NOTES
Follow up.  Current weight is 193# with ibwr of 155-189#.  Weight is up 15# since admission 5/21/17, up 2# in the past week.  Regular diet with double portions is ordered.  Meal intake is 100% most as recorded.  Stable weight is goal.  If gain continue, d/c double portions.  Appears weight gain has slowed.  RD will continue to follow weights.

## 2017-06-08 NOTE — PLAN OF CARE
Problem: Goal Outcome Summary  Goal: Goal Outcome Summary  Outcome: Therapy, progress toward functional goals as expected  Pt denies SI HI hallucinations depression anxiety and pain at this time.  He complains of his nose being dry and does have a nose bleed.  He asks for nasal spray.  Pt has questions or concerns at this time.  He is encouraged to participate in unit programming. Reminded pt to ask for the Alpha stim if needed .    1125:  Haldol 10 mg administered per his request for increase anxiety/agitaion .  He also requests that his REmeron be increased.     1230:  Pt reports that the Haldol helped with his agitation.     Problem: Anxiety (Adult)  Goal: Reduction/Resolution  Patient will verbalize a decrease in anxiety by d/c.   Patient will try one coping skill before using a PRN.   Outcome: Therapy, progress toward functional goals as expected  Pt denies Anxiety at this time. He uses head phones to help calm him.    Problem: Additional Goals: Nursing Focus  Goal: 1. Patient Goal: Nursing Focus  Patient has a history of swallowing objects prior to hospitalization.   Patient will not self harm by swallowing objects while inpatient.   Outcome: Therapy, progress toward functional goals as expected  No thoughts of self harm at this time.

## 2017-06-08 NOTE — PLAN OF CARE
Problem: Goal Outcome Summary  Goal: Goal Outcome Summary  Outcome: Improving  Pt eating and sleeping well  States headache relieved by tylenol  Pleasant and cooperative    Problem: Anxiety (Adult)  Goal: Reduction/Resolution  Patient will verbalize a decrease in anxiety by d/c.   Patient will try one coping skill before using a PRN.   Outcome: Improving  Pt has had a headache that he labeled as a 7 out of 10   Has attended the afternoon group  Also seems to enjoy listening to headphones while pacing the giles  2034  Tylenol 650 mg given for headache    Problem: Additional Goals: Nursing Focus  Goal: 1. Patient Goal: Nursing Focus  Patient has a history of swallowing objects prior to hospitalization.   Patient will not self harm by swallowing objects while inpatient.   Outcome: Improving  Pt has not swallowed anything inappropriate

## 2017-06-09 PROCEDURE — 12400011

## 2017-06-09 PROCEDURE — 25000132 ZZH RX MED GY IP 250 OP 250 PS 637: Performed by: NURSE PRACTITIONER

## 2017-06-09 RX ADMIN — SALINE NASAL SPRAY 1 SPRAY: 1.5 SOLUTION NASAL at 08:35

## 2017-06-09 RX ADMIN — NICOTINE 1 PATCH: 21 PATCH, EXTENDED RELEASE TRANSDERMAL at 08:33

## 2017-06-09 RX ADMIN — ACETAMINOPHEN 650 MG: 325 TABLET, FILM COATED ORAL at 17:55

## 2017-06-09 RX ADMIN — Medication 25 MG: at 20:12

## 2017-06-09 RX ADMIN — HALOPERIDOL 5 MG: 5 TABLET ORAL at 20:12

## 2017-06-09 RX ADMIN — DEXTRAN 70, AND HYPROMELLOSE 2910 1 DROP: 1; 3 SOLUTION/ DROPS OPHTHALMIC at 14:19

## 2017-06-09 RX ADMIN — SALINE NASAL SPRAY 1 SPRAY: 1.5 SOLUTION NASAL at 20:12

## 2017-06-09 RX ADMIN — SALINE NASAL SPRAY 1 SPRAY: 1.5 SOLUTION NASAL at 14:19

## 2017-06-09 RX ADMIN — DEXTRAN 70, AND HYPROMELLOSE 2910 1 DROP: 1; 3 SOLUTION/ DROPS OPHTHALMIC at 08:34

## 2017-06-09 RX ADMIN — MIRTAZAPINE 30 MG: 30 TABLET, FILM COATED ORAL at 20:12

## 2017-06-09 RX ADMIN — GABAPENTIN 300 MG: 300 CAPSULE ORAL at 14:19

## 2017-06-09 RX ADMIN — Medication 25 MG: at 08:34

## 2017-06-09 RX ADMIN — GABAPENTIN 300 MG: 300 CAPSULE ORAL at 08:34

## 2017-06-09 RX ADMIN — GABAPENTIN 300 MG: 300 CAPSULE ORAL at 20:12

## 2017-06-09 RX ADMIN — BUPROPION HYDROCHLORIDE 150 MG: 150 TABLET, FILM COATED, EXTENDED RELEASE ORAL at 08:34

## 2017-06-09 ASSESSMENT — ACTIVITIES OF DAILY LIVING (ADL)
GROOMING: INDEPENDENT
ORAL_HYGIENE: INDEPENDENT
ORAL_HYGIENE: INDEPENDENT
GROOMING: INDEPENDENT
DRESS: SCRUBS (BEHAVIORAL HEALTH);INDEPENDENT
DRESS: SCRUBS (BEHAVIORAL HEALTH);INDEPENDENT

## 2017-06-09 NOTE — PLAN OF CARE
Face to face end of shift report received from Amarjit TOBIN RN. Rounding completed. Patient observed in the [facility] area.     Isha Reddy  6/9/2017  7:58 AM

## 2017-06-09 NOTE — PLAN OF CARE
Problem: Goal Outcome Summary  Goal: Goal Outcome Summary  2330--in bed with eyes closed and breathing regular. 0521--Still in bed with eyes closed and breathing regular.

## 2017-06-09 NOTE — PROGRESS NOTES
"[facility]  Psychiatric Progress Note      Impression:   Janusz Escalera is a 46 year old male who presented via [facility] ED with reports of 2 suicide attempts the day of admission.     Patient was admitted on 5/21/17 and still meets hospital inpatient criteria.    Janusz tells me that he is feeling \"pretty good\" today. He does inquire as to whether his Remeron at bedtime can be increased. Will increase this to 30 mg tonight. He states that his anxiety seems to be well controlled on his current medications. He reports no urges to ingest objects. He also feels that his mood has been steadily improving. Wellbutrin was started a few days ago and he does seem to be tolerating this well. He states that he is hopeful that he will be able to \"move on\" from the hospital soon and is looking forward to getting into CD treatment. His behavior has been controlled and cooperative. He denies any SI, SIB, or hallucinations.     Educated regarding medication indications, possible s/e, risks, benefits and contraindications. Verbalized understanding.        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          Medications:   I have reviewed this patient's current medications  Prescription Medications as of 6/8/2017             HALOPERIDOL PO Take 5 mg by mouth At Bedtime    Mirtazapine (REMERON PO) Take 30 mg by mouth At Bedtime    TRAZODONE HCL PO Take 100 mg by mouth At Bedtime      Facility Administered Medications as of 6/8/2017             sodium " "chloride (OCEAN) 0.65 % nasal spray 1 spray Spray 1 spray in nostril every hour as needed for congestion    mirtazapine (REMERON) tablet 30 mg Take 1 tablet (30 mg) by mouth At Bedtime    buPROPion (WELLBUTRIN XL) 24 hr tablet 150 mg Take 1 tablet (150 mg) by mouth daily    gabapentin (NEURONTIN) capsule 300 mg Take 1 capsule (300 mg) by mouth 3 times daily    naltrexone (DEPADE;REVIA) tablet 25 mg Take 1 half-tab (25 mg) by mouth 2 times daily    traZODone (DESYREL) tablet 100 mg Take 1 tablet (100 mg) by mouth nightly as needed for sleep    hydrOXYzine (ATARAX) tablet 25-50 mg Take 1-2 tablets (25-50 mg) by mouth every 4 hours as needed for anxiety    acetaminophen (TYLENOL) tablet 650 mg Take 2 tablets (650 mg) by mouth every 4 hours as needed for mild pain    alum & mag hydroxide-simethicone (MYLANTA ES/MAALOX  ES) suspension 30 mL Take 30 mLs by mouth every 4 hours as needed for indigestion    magnesium hydroxide (MILK OF MAGNESIA) suspension 30 mL Take 30 mLs by mouth nightly as needed for constipation    bisacodyl (DULCOLAX) Suppository 10 mg Place 1 suppository (10 mg) rectally daily as needed for constipation    OLANZapine (zyPREXA) tablet 10 mg Take 1 tablet (10 mg) by mouth every 2 hours as needed for agitation (associated with psychosis or rufus)    Linked Group 1:  \"Or\" Linked Group Details     OLANZapine (zyPREXA) injection 10 mg Inject 10 mg into the muscle every 2 hours as needed for agitation (associated with psychosis or rufus)    Linked Group 1:  \"Or\" Linked Group Details     nicotine polacrilex (NICORETTE) gum 2-4 mg Place 1-2 each (2-4 mg) inside cheek every hour as needed for other (nicotine withdrawal symptoms)    hypromellose-dextran (ARTIFICAL TEARS) ophthalmic solution 1 drop Place 1 drop into both eyes 3 times daily    eye wash (BSS PLUS) ophthalmic solution 15 mL Place 15 mLs into both eyes 2 times daily as needed (contact storage)    haloperidol (HALDOL) tablet 5 mg Take 1 tablet (5 mg) " "by mouth At Bedtime    haloperidol (HALDOL) tablet 5 mg Take 1 tablet (5 mg) by mouth 3 times daily as needed for agitation    nicotine Patch in Place Place onto the skin every 8 hours    nicotine patch REMOVAL Place onto the skin daily    nicotine (NICODERM CQ) 21 MG/24HR 24 hr patch 1 patch Place 1 patch onto the skin daily    cloNIDine (CATAPRES) tablet 0.1 mg Take 1 tablet (0.1 mg) by mouth 2 times daily as needed (anxiety / methamphetamine withdrawal symptoms)    bacitracin ointment Apply topically 2 times daily as needed for wound care    mirtazapine (REMERON) tablet 15 mg (Discontinued) Take 1 tablet (15 mg) by mouth At Bedtime        10 point ROS reviewed - positive for nasal congestion       Allergies:     Allergies   Allergen Reactions     Cashews [Nuts] Swelling     Tongue, throat swelling            Psychiatric Examination:   /70  Pulse 78  Temp 98.6  F (37  C) (Tympanic)  Resp 16  Ht 1.803 m (5' 11\")  Wt 87.5 kg (193 lb)  SpO2 97%  BMI 26.92 kg/m2  Weight is 193 lbs 0 oz  Body mass index is 26.92 kg/(m^2).    Appearance:  Awake, alert, sitting up in the lounge, casually groomed  Attitude:  cooperative, pleasant  Eye Contact:  good  Mood:  \"pretty good\" reports improvement in depression and anxiety  Affect:  Euthymic, appropriate  Speech:  clear, coherent  Psychomotor Behavior:  no evidence of tardive dyskinesia, dystonia, or tics  Thought Process:  logical, linear and goal oriented  Associations:  no loose associations  Thought Content:  no evidence of suicidal ideation or homicidal ideation and no evidence of psychotic thought  Insight:  good  Judgment:  intact, though does have periods of impulsivity  Oriented to:  time, person, and place  Attention Span and Concentration:  intact  Recent and Remote Memory:  intact  Fund of Knowledge: appropriate  Muscle Strength and Tone: normal  Gait and Station: Normal           Labs:     Results for orders placed or performed during the hospital " encounter of 05/21/17 (from the past 48 hour(s))   Glucose by meter   Result Value Ref Range    Glucose 94 70 - 99 mg/dL              Plan:   Increase Remeron to 30 mg at bedtime  [place] nasal spray PRN congestion    Updates on the treatment plan:    Symptoms that must improve before discharge: depression, irritability, anxiety    Med changes targeting these symptoms: Wellbutrin XL 150mg daily started - will increase as tolerated. Remeron increased tonight    Aftercare needs identified (Day Tx, CD Tx, IRTS, etc.): Locked MICD programming/Care Program    Expected discharge date: When placement is available for [facility]. Secondary to repeated relapses, elopement and failure to follow through with programming, the risk of discharging to a lower level of care is too high and will most certainly result in another relapse and rehospitalization if not further damage to person.

## 2017-06-09 NOTE — PLAN OF CARE
Face to face end of shift report received from TOMEKA Causey. Rounding completed. Patient observed.     Ethan Cordero  6/9/2017  12:04 AM

## 2017-06-09 NOTE — PLAN OF CARE
Problem: Goal Outcome Summary  Goal: Goal Outcome Summary  Outcome: Therapy, progress toward functional goals is gradual  Pt has been in bed most of the day today. He is pleasant in conversation but does present with a generally flat affect. He denies any mental health symptoms or complaints.     Problem: Anxiety (Adult)  Goal: Reduction/Resolution  Patient will verbalize a decrease in anxiety by d/c.   Patient will try one coping skill before using a PRN.   Outcome: Therapy, progress toward functional goals is gradual  Pt offered no c/o anxiety.     Problem: Additional Goals: Nursing Focus  Goal: 1. Patient Goal: Nursing Focus  Patient has a history of swallowing objects prior to hospitalization.   Patient will not self harm by swallowing objects while inpatient.   Outcome: Improving  Pt denies any SI or thoughts of SIB.

## 2017-06-09 NOTE — PLAN OF CARE
Face to face end of shift report received from Isha GONZALEZ RN. Rounding completed. Patient observed in his room.     Amee Dillard  6/9/2017  3:44 PM

## 2017-06-10 PROCEDURE — 25000132 ZZH RX MED GY IP 250 OP 250 PS 637: Performed by: NURSE PRACTITIONER

## 2017-06-10 PROCEDURE — 99232 SBSQ HOSP IP/OBS MODERATE 35: CPT | Performed by: NURSE PRACTITIONER

## 2017-06-10 PROCEDURE — 12400011

## 2017-06-10 RX ADMIN — WATER 15 ML: 99.05 SOLUTION OPHTHALMIC at 13:28

## 2017-06-10 RX ADMIN — HALOPERIDOL 5 MG: 5 TABLET ORAL at 18:12

## 2017-06-10 RX ADMIN — DEXTRAN 70, AND HYPROMELLOSE 2910 1 DROP: 1; 3 SOLUTION/ DROPS OPHTHALMIC at 13:32

## 2017-06-10 RX ADMIN — SALINE NASAL SPRAY 1 SPRAY: 1.5 SOLUTION NASAL at 20:28

## 2017-06-10 RX ADMIN — GABAPENTIN 300 MG: 300 CAPSULE ORAL at 13:24

## 2017-06-10 RX ADMIN — HALOPERIDOL 5 MG: 5 TABLET ORAL at 20:30

## 2017-06-10 RX ADMIN — ACETAMINOPHEN 650 MG: 325 TABLET, FILM COATED ORAL at 19:16

## 2017-06-10 RX ADMIN — GABAPENTIN 300 MG: 300 CAPSULE ORAL at 08:30

## 2017-06-10 RX ADMIN — MIRTAZAPINE 30 MG: 30 TABLET, FILM COATED ORAL at 20:25

## 2017-06-10 RX ADMIN — DEXTRAN 70, AND HYPROMELLOSE 2910 1 DROP: 1; 3 SOLUTION/ DROPS OPHTHALMIC at 20:28

## 2017-06-10 RX ADMIN — Medication 25 MG: at 08:30

## 2017-06-10 RX ADMIN — NICOTINE 1 PATCH: 21 PATCH, EXTENDED RELEASE TRANSDERMAL at 08:29

## 2017-06-10 RX ADMIN — GABAPENTIN 300 MG: 300 CAPSULE ORAL at 20:25

## 2017-06-10 RX ADMIN — Medication 25 MG: at 20:25

## 2017-06-10 RX ADMIN — NICOTINE POLACRILEX 4 MG: 2 GUM, CHEWING ORAL at 13:24

## 2017-06-10 RX ADMIN — BUPROPION HYDROCHLORIDE 150 MG: 150 TABLET, FILM COATED, EXTENDED RELEASE ORAL at 08:30

## 2017-06-10 RX ADMIN — ACETAMINOPHEN 650 MG: 325 TABLET, FILM COATED ORAL at 08:34

## 2017-06-10 ASSESSMENT — ACTIVITIES OF DAILY LIVING (ADL)
GROOMING: INDEPENDENT
DRESS: SCRUBS (BEHAVIORAL HEALTH);INDEPENDENT
ORAL_HYGIENE: INDEPENDENT
DRESS: SCRUBS (BEHAVIORAL HEALTH);INDEPENDENT
GROOMING: INDEPENDENT
ORAL_HYGIENE: INDEPENDENT

## 2017-06-10 NOTE — PLAN OF CARE
Face to face end of shift report received from ERLINDA Mann NP. Rounding completed. Patient observed, resting in bed with eyes closed.     Ronan Alarcon  6/10/2017  7:50 AM

## 2017-06-10 NOTE — PLAN OF CARE
Face to face end of shift report received from Amee ECKERT. Rounding completed. Patient observed in bed.     Dottie Mnan  6/9/2017  11:36 PM

## 2017-06-10 NOTE — PLAN OF CARE
Face to face end of shift report received from Ronan ANTHONY RN. Rounding completed. Patient observed in the lounge.     Amee Dillard  6/10/2017  3:44 PM

## 2017-06-10 NOTE — PLAN OF CARE
Problem: Goal Outcome Summary  Goal: Goal Outcome Summary  Pt has spent the shift isolating to his room, resting in bed. Did not attend groups this shift. Pleasant and cooperative on assessment. Affect remains flat and blunted, however. Denied anxiety, depression, and/or SI. Denied pain. Did report a headache and posterior neck pain, rated 5/10. PRN APAP 650 mg PO given at approximately 0830 with good effect.     Problem: Anxiety (Adult)  Goal: Reduction/Resolution  Patient will verbalize a decrease in anxiety by d/c.   Patient will try one coping skill before using a PRN.   Outcome: No Change  Pt denied anxiety on assessment. Has not requested any PRN medications this shift.    Problem: Additional Goals: Nursing Focus  Goal: 1. Patient Goal: Nursing Focus  Patient has a history of swallowing objects prior to hospitalization.   Patient will not self harm by swallowing objects while inpatient.   Outcome: No Change  Pt has remained free from self-harm and/or injury this shift.

## 2017-06-10 NOTE — PLAN OF CARE
"Problem: Goal Outcome Summary  Goal: Goal Outcome Summary  Outcome: No Change  Patient has been isolative and withdrawn and has been resting in bed the majority of the shift. Patient c/o interrupted sleep last night. He stated \"I got up 4-5 times in the night to pee. I haven't been drinking more liquids than usual.\" Patient denies dysuria. He had a phone screening with [facility] ([phone]) located in [place], [place] at 1540. He denied anxiety, depression, hallucinations, homicidal ideation, and suicidal ideation. He c/o a headache and was given PRN Tylenol at 1755. Relief reported.     Problem: Anxiety (Adult)  Goal: Reduction/Resolution  Patient will verbalize a decrease in anxiety by d/c.   Patient will try one coping skill before using a PRN.   Outcome: Improving  Patient denies anxiety.     Problem: Additional Goals: Nursing Focus  Goal: 1. Patient Goal: Nursing Focus  Patient has a history of swallowing objects prior to hospitalization.   Patient will not self harm by swallowing objects while inpatient.   Outcome: Improving  Patient denied thoughts of self harm.       "

## 2017-06-11 PROCEDURE — 12400011

## 2017-06-11 PROCEDURE — 25000132 ZZH RX MED GY IP 250 OP 250 PS 637: Performed by: NURSE PRACTITIONER

## 2017-06-11 RX ADMIN — GABAPENTIN 300 MG: 300 CAPSULE ORAL at 08:39

## 2017-06-11 RX ADMIN — HALOPERIDOL 5 MG: 5 TABLET ORAL at 20:45

## 2017-06-11 RX ADMIN — Medication 25 MG: at 08:39

## 2017-06-11 RX ADMIN — SALINE NASAL SPRAY 1 SPRAY: 1.5 SOLUTION NASAL at 08:44

## 2017-06-11 RX ADMIN — Medication 25 MG: at 20:46

## 2017-06-11 RX ADMIN — DEXTRAN 70, AND HYPROMELLOSE 2910 1 DROP: 1; 3 SOLUTION/ DROPS OPHTHALMIC at 14:29

## 2017-06-11 RX ADMIN — GABAPENTIN 300 MG: 300 CAPSULE ORAL at 14:29

## 2017-06-11 RX ADMIN — BUPROPION HYDROCHLORIDE 150 MG: 150 TABLET, FILM COATED, EXTENDED RELEASE ORAL at 08:39

## 2017-06-11 RX ADMIN — HALOPERIDOL 5 MG: 5 TABLET ORAL at 19:07

## 2017-06-11 RX ADMIN — GABAPENTIN 300 MG: 300 CAPSULE ORAL at 20:45

## 2017-06-11 RX ADMIN — NICOTINE 1 PATCH: 21 PATCH, EXTENDED RELEASE TRANSDERMAL at 08:37

## 2017-06-11 RX ADMIN — MIRTAZAPINE 30 MG: 30 TABLET, FILM COATED ORAL at 20:46

## 2017-06-11 ASSESSMENT — ACTIVITIES OF DAILY LIVING (ADL)
GROOMING: INDEPENDENT
GROOMING: INDEPENDENT
DRESS: SCRUBS (BEHAVIORAL HEALTH);INDEPENDENT
ORAL_HYGIENE: INDEPENDENT
ORAL_HYGIENE: INDEPENDENT
DRESS: SCRUBS (BEHAVIORAL HEALTH);INDEPENDENT

## 2017-06-11 NOTE — PLAN OF CARE
"Problem: Goal Outcome Summary  Goal: Goal Outcome Summary  Pt has been up and about on the unit intermittently throughout the shift. Pleasant and cooperative with writer. Reports his mood as \"really pretty good.\" Denied depression, anxiety, and/or SI. Also denied pain today. Denied having any thoughts and/or urges to swallow/ingest foreign object(s). Tells this writer \"I haven't had any urges recently--I just told the nurse that I was thinking about when other people swallow batteries, what happens?\"    Problem: Anxiety (Adult)  Goal: Reduction/Resolution  Patient will verbalize a decrease in anxiety by d/c.   Patient will try one coping skill before using a PRN.   Outcome: No Change  Pt denied anxiety on assessment this shift. Pt has not requested any PRN medication. Pt has been observed in the lounge with headphones on this afternoon.    Problem: Additional Goals: Nursing Focus  Goal: 1. Patient Goal: Nursing Focus  Patient has a history of swallowing objects prior to hospitalization.   Patient will not self harm by swallowing objects while inpatient.   Outcome: No Change  Pt has remained free from self-harm and/or injury this shift. Pt denied having any thoughts and/or urges to swallow/ingest foreign objects.      "

## 2017-06-11 NOTE — PLAN OF CARE
Face to face end of shift report received from Ronan ANTHONY RN. Rounding completed. Patient observed in the lounge.     Amee Dillard  6/11/2017  3:35 PM

## 2017-06-11 NOTE — PLAN OF CARE
Face to face end of shift report received from ERLINDA Mann RN. Rounding completed. Patient observed, up and about on the unit.     Ronan Alarcon  6/11/2017  7:52 AM

## 2017-06-11 NOTE — PLAN OF CARE
Face to face end of shift report received from Amee ECKERT. Rounding completed. Patient observed in bed.     Dottie Mann  6/10/2017  11:38 PM

## 2017-06-11 NOTE — PLAN OF CARE
"Problem: Goal Outcome Summary  Goal: Goal Outcome Summary  Outcome: Improving  Patient has been up on the unit and socializing with peers. He requested and was given 5 mg of PO Haldol at 1812 for agitation and anxiety. Patient appeared tense after a conversation with a peer. He denied depression, suicidal ideation, homicidal ideation, and hallucinations. He c/o a headache and was given PRN Tylenol at 1916. I think that the headaches are from not being hydrated enough. Pt stated \"I don't think that I'm drinking enough water, but I don't like the taste of the [place] water.\" Pt also reported having thoughts of \"what would happen if someone swallowed a battery\" while he was resting in bed last night. Pt denied wanting to swallow anything to engage in self harm. He stated \"it was just a thought. I'm keeping myself busy up here. I used to swallow things in the past to help with my anxiety or when I was feeling suicidal.\"     Problem: Anxiety (Adult)  Goal: Reduction/Resolution  Patient will verbalize a decrease in anxiety by d/c.   Patient will try one coping skill before using a PRN.   Outcome: No Change  Pt was given PRN Haldol for c/o agitation and anxiety.     Problem: Additional Goals: Nursing Focus  Goal: 1. Patient Goal: Nursing Focus  Patient has a history of swallowing objects prior to hospitalization.   Patient will not self harm by swallowing objects while inpatient.   Outcome: Improving  Pt remained free from self harm.       "

## 2017-06-11 NOTE — PROGRESS NOTES
[facility]  Psychiatric Progress Note      Impression:   Janusz Escalera is a 46 year old male who presented via [facility] ED with reports of 2 suicide attempts the day of admission.     Patient was admitted on 5/21/17 and still meets hospital inpatient criteria.    Janusz denies any significant issues with depression or anxiety. He denies any suicidal ideation. He has been attending groups and is social with staff and peers. He tells me that he has been sleeping well. Remeron had been increased per his request to see if this would improve sleep. He denies any notable side effects from medication. States that he is waiting to hear where he will be discharging to and is hopeful that it will be sometime this coming week.    Educated regarding medication indications, possible s/e, risks, benefits and contraindications. Verbalized understanding.        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          Medications:   I have reviewed this patient's current medications  Prescription Medications as of 6/11/2017             HALOPERIDOL PO Take 5 mg by mouth At Bedtime    Mirtazapine (REMERON PO) Take 30 mg by mouth At Bedtime    TRAZODONE HCL PO Take 100 mg by mouth At Bedtime      Facility Administered Medications as of 6/11/2017             sodium chloride (OCEAN) 0.65 % nasal spray 1 spray Spray 1 spray in nostril every hour as needed for congestion    mirtazapine (REMERON) tablet 30 mg Take 1 tablet (30 mg) by mouth At Bedtime     "buPROPion (WELLBUTRIN XL) 24 hr tablet 150 mg Take 1 tablet (150 mg) by mouth daily    gabapentin (NEURONTIN) capsule 300 mg Take 1 capsule (300 mg) by mouth 3 times daily    naltrexone (DEPADE;REVIA) tablet 25 mg Take 1 half-tab (25 mg) by mouth 2 times daily    traZODone (DESYREL) tablet 100 mg Take 1 tablet (100 mg) by mouth nightly as needed for sleep    hydrOXYzine (ATARAX) tablet 25-50 mg Take 1-2 tablets (25-50 mg) by mouth every 4 hours as needed for anxiety    acetaminophen (TYLENOL) tablet 650 mg Take 2 tablets (650 mg) by mouth every 4 hours as needed for mild pain    alum & mag hydroxide-simethicone (MYLANTA ES/MAALOX  ES) suspension 30 mL Take 30 mLs by mouth every 4 hours as needed for indigestion    magnesium hydroxide (MILK OF MAGNESIA) suspension 30 mL Take 30 mLs by mouth nightly as needed for constipation    bisacodyl (DULCOLAX) Suppository 10 mg Place 1 suppository (10 mg) rectally daily as needed for constipation    OLANZapine (zyPREXA) tablet 10 mg Take 1 tablet (10 mg) by mouth every 2 hours as needed for agitation (associated with psychosis or rufus)    Linked Group 1:  \"Or\" Linked Group Details     OLANZapine (zyPREXA) injection 10 mg Inject 10 mg into the muscle every 2 hours as needed for agitation (associated with psychosis or rufus)    Linked Group 1:  \"Or\" Linked Group Details     nicotine polacrilex (NICORETTE) gum 2-4 mg Place 1-2 each (2-4 mg) inside cheek every hour as needed for other (nicotine withdrawal symptoms)    hypromellose-dextran (ARTIFICAL TEARS) ophthalmic solution 1 drop Place 1 drop into both eyes 3 times daily    eye wash (BSS PLUS) ophthalmic solution 15 mL Place 15 mLs into both eyes 2 times daily as needed (contact storage)    haloperidol (HALDOL) tablet 5 mg Take 1 tablet (5 mg) by mouth At Bedtime    haloperidol (HALDOL) tablet 5 mg Take 1 tablet (5 mg) by mouth 3 times daily as needed for agitation    nicotine Patch in Place Place onto the skin every 8 hours    " "nicotine patch REMOVAL Place onto the skin daily    nicotine (NICODERM CQ) 21 MG/24HR 24 hr patch 1 patch Place 1 patch onto the skin daily    cloNIDine (CATAPRES) tablet 0.1 mg Take 1 tablet (0.1 mg) by mouth 2 times daily as needed (anxiety / methamphetamine withdrawal symptoms)    bacitracin ointment Apply topically 2 times daily as needed for wound care        10 point ROS reviewed - negative other than per HPI       Allergies:     Allergies   Allergen Reactions     Cashews [Nuts] Swelling     Tongue, throat swelling            Psychiatric Examination:   /79  Pulse 82  Temp 99  F (37.2  C) (Tympanic)  Resp 16  Ht 1.803 m (5' 11\")  Wt 89.5 kg (197 lb 5 oz)  SpO2 96%  BMI 27.52 kg/m2  Weight is 197 lbs 4.99 oz  Body mass index is 27.52 kg/(m^2).    Appearance:  Awake, alert, dressed in hospital scrubs, casually groomed  Attitude: cooperative, pleasant  Eye Contact:  Makes good eye contact  Mood:  Denies significant depression or anxiety  Affect:  appropriate  Speech:  clear, coherent  Psychomotor Behavior:  no evidence of tardive dyskinesia, dystonia, or tics  Thought Process:  logical, linear and goal oriented  Associations:  no loose associations  Thought Content:  no evidence of suicidal ideation or homicidal ideation and no evidence of psychotic thought  Insight:  good  Judgment:  intact, though does have periods of impulsivity  Oriented to:  time, person, and place  Attention Span and Concentration:  intact  Recent and Remote Memory:  intact  Fund of Knowledge: appropriate  Muscle Strength and Tone: normal  Gait and Station: Normal           Labs:     No results found for this or any previous visit (from the past 48 hour(s)).           Plan:   Continue medications        Updates on the treatment plan:    Symptoms that must improve before discharge: depression, irritability, anxiety    Med changes targeting these symptoms: no medication changes today, continue same meds    Aftercare needs " identified (Day Tx, CD Tx, IRTS, etc.): Locked MICD programming/Care Program    Expected discharge date: When placement is available at [facility]. Secondary to repeated relapses, elopement and failure to follow through with programming, the risk of discharging to a lower level of care is too high and will most certainly result in another relapse and rehospitalization if not further damage to person.

## 2017-06-12 LAB — SP GR UR STRIP: 1.01 (ref 1–1.03)

## 2017-06-12 PROCEDURE — 25000132 ZZH RX MED GY IP 250 OP 250 PS 637: Performed by: NURSE PRACTITIONER

## 2017-06-12 PROCEDURE — 12400011

## 2017-06-12 PROCEDURE — 81003 URINALYSIS AUTO W/O SCOPE: CPT | Performed by: NURSE PRACTITIONER

## 2017-06-12 RX ORDER — BENZTROPINE MESYLATE 1 MG/1
1 TABLET ORAL AT BEDTIME
Status: DISCONTINUED | OUTPATIENT
Start: 2017-06-12 | End: 2017-06-15 | Stop reason: HOSPADM

## 2017-06-12 RX ADMIN — NICOTINE 1 PATCH: 21 PATCH, EXTENDED RELEASE TRANSDERMAL at 08:13

## 2017-06-12 RX ADMIN — BENZTROPINE MESYLATE 1 MG: 1 TABLET ORAL at 20:36

## 2017-06-12 RX ADMIN — Medication 25 MG: at 20:36

## 2017-06-12 RX ADMIN — DEXTRAN 70, AND HYPROMELLOSE 2910 1 DROP: 1; 3 SOLUTION/ DROPS OPHTHALMIC at 08:14

## 2017-06-12 RX ADMIN — MIRTAZAPINE 30 MG: 30 TABLET, FILM COATED ORAL at 20:36

## 2017-06-12 RX ADMIN — ACETAMINOPHEN 650 MG: 325 TABLET, FILM COATED ORAL at 14:00

## 2017-06-12 RX ADMIN — NICOTINE POLACRILEX 4 MG: 2 GUM, CHEWING ORAL at 13:30

## 2017-06-12 RX ADMIN — Medication 25 MG: at 08:14

## 2017-06-12 RX ADMIN — GABAPENTIN 300 MG: 300 CAPSULE ORAL at 08:14

## 2017-06-12 RX ADMIN — GABAPENTIN 300 MG: 300 CAPSULE ORAL at 14:00

## 2017-06-12 RX ADMIN — GABAPENTIN 300 MG: 300 CAPSULE ORAL at 20:36

## 2017-06-12 RX ADMIN — HALOPERIDOL 5 MG: 5 TABLET ORAL at 20:36

## 2017-06-12 RX ADMIN — DEXTRAN 70, AND HYPROMELLOSE 2910 1 DROP: 1; 3 SOLUTION/ DROPS OPHTHALMIC at 14:00

## 2017-06-12 RX ADMIN — BUPROPION HYDROCHLORIDE 150 MG: 150 TABLET, FILM COATED, EXTENDED RELEASE ORAL at 08:14

## 2017-06-12 ASSESSMENT — ACTIVITIES OF DAILY LIVING (ADL)
ORAL_HYGIENE: INDEPENDENT
DRESS: SCRUBS (BEHAVIORAL HEALTH);INDEPENDENT
LAUNDRY: UNABLE TO COMPLETE
DRESS: INDEPENDENT;SCRUBS (BEHAVIORAL HEALTH)
ORAL_HYGIENE: INDEPENDENT
GROOMING: INDEPENDENT
GROOMING: INDEPENDENT

## 2017-06-12 NOTE — PLAN OF CARE
Face to face end of shift report received from TOMEKA Sinclair. Rounding completed. Patient observed in bed.    Jaqueline Underwood  6/12/2017  7:51 AM

## 2017-06-12 NOTE — PLAN OF CARE
Problem: Discharge Planning  Goal: Discharge Planning (Adult, OB, Behavioral, Peds)  Writer called [facility]- pt is now on all the [facility] lists- they requested updated notes and MAR.

## 2017-06-12 NOTE — PLAN OF CARE
Face to face end of shift report received from Amee ECKERT. Rounding completed. Patient observed in bed.     Dottie Mann  6/11/2017  11:22 PM

## 2017-06-12 NOTE — PLAN OF CARE
Problem: General Plan of Care (Inpatient Behavioral)  Goal: Team Discussion  Team Plan:   BEHAVIORAL TEAM DISCUSSION     Participants: Erica Robbins NP, Shakira Phan NP, Keysha De Leon NP, Ronel Winters OT, Imani More OT, Leslie Salinas, [facility], Soila Montejo DIscharge planner, Leyda Batres RN, Sara Johnston RN, Aislinn Malik RN.   Progress: No urges to eat inedible objects, attending groups, taking meds, cooperative  Continued Stay Criteria/Rationale: high risk for relapse, unsafe to discharge to a lower level of care  Medical/Physical: None  Precautions:   Behavioral Orders   Procedures     Code 1 - Restrict to Unit     Routine Programming       As clinically indicated     Plan: Care or [facility] whichever has availability first.  Rationale for change in precautions or plan: none at this time.

## 2017-06-12 NOTE — PLAN OF CARE
"Problem: Goal Outcome Summary  Goal: Goal Outcome Summary  Outcome: No Change  Problem: Goal Outcome Summary  Goal: Goal Outcome Summary  Pt has been withdrawn and has been observed pacing back and forth in the hallway with his headphones on. He was restless and tense and stated that he is feeling \"anxious and bored\". He informed this writer that he has been biting his tongue,  the sides of his inner cheek, and clenching his teeth. Pt was given 5 mg of PRN Haldol at 1907. Pt also informed this writer that he made a phone call to an unknown woman today - he stated \"I found a piece of paper with a name and number on it in a coloring book and I figured that I'd call it.\" Pt informed that writer that he talked to the woman for 45 minutes and that she said \"maybe you getting my number is fate.\" Discussed the importance of maintaining appropriate boundaries. He was medication compliant. Denied depression, homicidal ideation, and suicidal ideation.     Problem: Anxiety (Adult)  Goal: Reduction/Resolution  Patient will verbalize a decrease in anxiety by d/c.   Patient will try one coping skill before using a PRN.   Outcome: No Change  Pt endorsed anxiety and agitation and received a PRN.     Problem: Additional Goals: Nursing Focus  Goal: 1. Patient Goal: Nursing Focus  Patient has a history of swallowing objects prior to hospitalization.   Patient will not self harm by swallowing objects while inpatient.   Outcome: Improving  Pt remained free from self harm. He denied the urge to ingest foreign objects.       "

## 2017-06-12 NOTE — PLAN OF CARE
"Problem: Goal Outcome Summary  Goal: Goal Outcome Summary  Outcome: Improving  Patient was cooperative with assessment this am. He denies all criteria. He has not had any SIB or swallowing of any objects this shift. He does not attend groups. He has been medication compliant. He comes out for meals and does socialize with peers. He appears withdrawn and did spend most of this shift in his room in between meals. He states, \"I am just bored and sick of being here\". He is anxiously awaiting d/c to the [facility]. Offers no c/o t/o the shift.     Problem: General Plan of Care (Inpatient Behavioral)  Goal: Team Discussion  Team Plan:   BEHAVIORAL TEAM DISCUSSION     Participants:  Progress:  Continued Stay Criteria/Rationale:  Medical/Physical:  Precautions:   Behavioral Orders   Procedures     Code 1 - Restrict to Unit     Routine Programming       As clinically indicated     Plan:   Rationale for change in precautions or plan:       Goal: Plan of Care Review (Adult,OB,Behavioral)  The patient and/or their representative will communicate an understanding of their plan of care.   Outcome: Improving  Patient verbalizes an understanding of his plan of care.     Problem: Anxiety (Adult)  Goal: Reduction/Resolution  Patient will verbalize a decrease in anxiety by d/c.   Patient will try one coping skill before using a PRN.   Outcome: Improving  Patient denies anxiety this shift. Patient has been utilizing coping skills.    Problem: Additional Goals: Nursing Focus  Goal: 1. Patient Goal: Nursing Focus  Patient has a history of swallowing objects prior to hospitalization.   Patient will not self harm by swallowing objects while inpatient.   Outcome: Improving  Patient has not swallowed any objects this shift or done any self harm.      "

## 2017-06-12 NOTE — PLAN OF CARE
Problem: Discharge Planning  Goal: Discharge Planning (Adult, OB, Behavioral, Peds)  Outcome: No Change  Notes and MAR sent to [facility]PA as requested.

## 2017-06-13 LAB
ALBUMIN SERPL-MCNC: 3.3 G/DL (ref 3.4–5)
ALP SERPL-CCNC: 105 U/L (ref 40–150)
ALT SERPL W P-5'-P-CCNC: 236 U/L (ref 0–70)
ANION GAP SERPL CALCULATED.3IONS-SCNC: 6 MMOL/L (ref 3–14)
AST SERPL W P-5'-P-CCNC: 108 U/L (ref 0–45)
BILIRUB SERPL-MCNC: 0.3 MG/DL (ref 0.2–1.3)
BUN SERPL-MCNC: 16 MG/DL (ref 7–30)
CALCIUM SERPL-MCNC: 8.4 MG/DL (ref 8.5–10.1)
CHLORIDE SERPL-SCNC: 107 MMOL/L (ref 94–109)
CO2 SERPL-SCNC: 27 MMOL/L (ref 20–32)
CREAT SERPL-MCNC: 0.74 MG/DL (ref 0.66–1.25)
GFR SERPL CREATININE-BSD FRML MDRD: ABNORMAL ML/MIN/1.7M2
GLUCOSE SERPL-MCNC: 79 MG/DL (ref 70–99)
POTASSIUM SERPL-SCNC: 4.1 MMOL/L (ref 3.4–5.3)
PROT SERPL-MCNC: 7.4 G/DL (ref 6.8–8.8)
SODIUM SERPL-SCNC: 140 MMOL/L (ref 133–144)

## 2017-06-13 PROCEDURE — 25000132 ZZH RX MED GY IP 250 OP 250 PS 637: Performed by: NURSE PRACTITIONER

## 2017-06-13 PROCEDURE — 36415 COLL VENOUS BLD VENIPUNCTURE: CPT | Performed by: NURSE PRACTITIONER

## 2017-06-13 PROCEDURE — 80053 COMPREHEN METABOLIC PANEL: CPT | Performed by: NURSE PRACTITIONER

## 2017-06-13 PROCEDURE — 12400011

## 2017-06-13 RX ADMIN — GABAPENTIN 300 MG: 300 CAPSULE ORAL at 13:32

## 2017-06-13 RX ADMIN — ACETAMINOPHEN 650 MG: 325 TABLET, FILM COATED ORAL at 15:50

## 2017-06-13 RX ADMIN — BENZTROPINE MESYLATE 1 MG: 1 TABLET ORAL at 20:41

## 2017-06-13 RX ADMIN — NICOTINE 1 PATCH: 21 PATCH, EXTENDED RELEASE TRANSDERMAL at 08:29

## 2017-06-13 RX ADMIN — Medication 25 MG: at 20:41

## 2017-06-13 RX ADMIN — GABAPENTIN 300 MG: 300 CAPSULE ORAL at 08:30

## 2017-06-13 RX ADMIN — GABAPENTIN 300 MG: 300 CAPSULE ORAL at 20:41

## 2017-06-13 RX ADMIN — MIRTAZAPINE 30 MG: 30 TABLET, FILM COATED ORAL at 20:42

## 2017-06-13 RX ADMIN — HALOPERIDOL 5 MG: 5 TABLET ORAL at 20:42

## 2017-06-13 RX ADMIN — BUPROPION HYDROCHLORIDE 150 MG: 150 TABLET, FILM COATED, EXTENDED RELEASE ORAL at 08:30

## 2017-06-13 RX ADMIN — Medication 25 MG: at 08:30

## 2017-06-13 ASSESSMENT — ACTIVITIES OF DAILY LIVING (ADL)
LAUNDRY: UNABLE TO COMPLETE
ORAL_HYGIENE: INDEPENDENT
GROOMING: INDEPENDENT
DRESS: SCRUBS (BEHAVIORAL HEALTH)

## 2017-06-13 NOTE — PROGRESS NOTES
Follow up.  Current weight is 197# with ibwr of 155-189#.  Weight is up 19# since admission.     Pt is receiving a Regular diet with double portions.  Meal intake is 100% as recorded.      Recommend d/c double portions r/t weight gain and weight now greater than ideal range.

## 2017-06-13 NOTE — PLAN OF CARE
Face to face end of shift report received from TOMEKA Staton. Rounding completed. Patient observed resting in bed.     JÚNIOR IBANEZ  6/13/2017  7:34 AM

## 2017-06-13 NOTE — PLAN OF CARE
Problem: Goal Outcome Summary  Goal: Goal Outcome Summary  Patient appears blunt and slightly flat. Pleasant and cooperative during nurse assessment. Denies depression, having thoughts or urges to hurt self or others. Denies anxiety, states feels good and ready to be discharge to treatment and that is sleeping to past time. Patient decline elaborating in feelings and thoughts. Patient did not participate in groups and staying in room most of the time. Lab results obtained, ALT and AST elevated and calcium and albumin low. Provider notified during treatment team and sticky note.     Problem: General Plan of Care (Inpatient Behavioral)  Goal: Team Discussion  Team Plan:   BEHAVIORAL TEAM DISCUSSION     Participants:  Progress:  Continued Stay Criteria/Rationale:   Medical/Physical:  Precautions:   Behavioral Orders   Procedures     Code 1 - Restrict to Unit     Routine Programming       As clinically indicated     Plan:   Rationale for change in precautions or plan:        Problem: Anxiety (Adult)  Goal: Reduction/Resolution  Patient will verbalize a decrease in anxiety by d/c.   Patient will try one coping skill before using a PRN.   Outcome: Therapy, progress toward functional goals as expected  Patient verbalized improvement of anxiety this shift.     Problem: Additional Goals: Nursing Focus  Goal: 1. Patient Goal: Nursing Focus  Patient has a history of swallowing objects prior to hospitalization.   Patient will not self harm by swallowing objects while inpatient.   Outcome: Therapy, progress toward functional goals as expected  No self harm noted or reported this shift. Denies having urges to swallow objects this shift.

## 2017-06-13 NOTE — PLAN OF CARE
Pt has been in bed with eyes closed and regular respirations 7 hours. 15 minute and PRN checks all night. No complaints offered. Will continue to monitor. Lab draw (CMP) administered via lab @ [facility] per order.       Brionna Correa  6/13/2017  5:25 AM

## 2017-06-13 NOTE — PLAN OF CARE
Problem: Discharge Planning  Goal: Discharge Planning (Adult, OB, Behavioral, Peds)    Late Entry from 6/12/17:  Writer called [facility] and was informed pt is now on all the [facility] lists.  Informed pt on this information.  Pt stated he was ready to go and hoped he would this week.

## 2017-06-13 NOTE — PLAN OF CARE
Problem: Goal Outcome Summary  Goal: Goal Outcome Summary  Pt is pleasant and cooperative with writer. He admits to some low anxiety but denies the rest of the criteria.  Pt did attend group this shift.  He has been pacing the hallways this evening.  Pt cooperative with all medications this shift.     Problem: Anxiety (Adult)  Goal: Reduction/Resolution  Patient will verbalize a decrease in anxiety by d/c.   Patient will try one coping skill before using a PRN.   Pt admits to some low anxiety this shift but dose not need a PRN at this time. States will notify nurse if he dose.     Problem: Additional Goals: Nursing Focus  Goal: 1. Patient Goal: Nursing Focus  Patient has a history of swallowing objects prior to hospitalization.   Patient will not self harm by swallowing objects while inpatient.   Pt did not report swallowing any objects and writer did not see pt swallow any objects this shift. Will continue to monitor.

## 2017-06-13 NOTE — PLAN OF CARE
Face to face end of shift report received from TOMEKA Hernandez. Rounding completed. Patient observed in [facility].     Rolly Kaiser  6/13/2017  3:52 PM

## 2017-06-13 NOTE — PLAN OF CARE
Problem: General Plan of Care (Inpatient Behavioral)  Goal: Team Discussion  Team Plan:   BEHAVIORAL TEAM DISCUSSION     Participants: Imani More OTR, Leyda Vilchis RN, Paige Gaytan LSW, Leslie Salinas [facility], Hien Montejo DC planner, Keysha De Leon NP, Shakira Phan NP, Erica Polanco NP, Sara Johnston RN supervisor, Krystyna DUENAS  Progress: stabilizing on medications, attending groups  Continued Stay Criteria/Rationale: Waiting placement at [facility] due to possibility of re hospitalization if discharged to [facility] care facility  Medical/Physical: look at labs, increased urinary frequency  Precautions:   Behavioral Orders   Procedures     Code 1 - Restrict to Unit     Routine Programming       As clinically indicated     Plan: DC to [facility]  Rationale for change in precautions or plan: None

## 2017-06-14 PROCEDURE — 93005 ELECTROCARDIOGRAM TRACING: CPT

## 2017-06-14 PROCEDURE — 99239 HOSP IP/OBS DSCHRG MGMT >30: CPT | Performed by: NURSE PRACTITIONER

## 2017-06-14 PROCEDURE — 25000132 ZZH RX MED GY IP 250 OP 250 PS 637: Performed by: NURSE PRACTITIONER

## 2017-06-14 PROCEDURE — 12400011

## 2017-06-14 PROCEDURE — 93010 ELECTROCARDIOGRAM REPORT: CPT | Performed by: INTERNAL MEDICINE

## 2017-06-14 RX ORDER — GABAPENTIN 300 MG/1
600 CAPSULE ORAL 3 TIMES DAILY
Qty: 90 CAPSULE | Status: ON HOLD | COMMUNITY
Start: 2017-06-14 | End: 2021-05-09

## 2017-06-14 RX ORDER — GABAPENTIN 300 MG/1
600 CAPSULE ORAL 3 TIMES DAILY
Status: DISCONTINUED | OUTPATIENT
Start: 2017-06-14 | End: 2017-06-15 | Stop reason: HOSPADM

## 2017-06-14 RX ORDER — HALOPERIDOL 5 MG/1
5 TABLET ORAL 3 TIMES DAILY PRN
Status: ON HOLD | COMMUNITY
Start: 2017-06-14 | End: 2021-05-09

## 2017-06-14 RX ORDER — BUPROPION HYDROCHLORIDE 150 MG/1
150 TABLET ORAL DAILY
Qty: 30 TABLET | Status: ON HOLD | COMMUNITY
Start: 2017-06-14 | End: 2021-05-09

## 2017-06-14 RX ORDER — TRAZODONE HYDROCHLORIDE 50 MG/1
100 TABLET, FILM COATED ORAL AT BEDTIME
Qty: 90 TABLET | Status: ON HOLD | COMMUNITY
Start: 2017-06-14 | End: 2021-05-09

## 2017-06-14 RX ORDER — BENZTROPINE MESYLATE 1 MG/1
1 TABLET ORAL AT BEDTIME
Status: ON HOLD | COMMUNITY
Start: 2017-06-14 | End: 2021-05-09

## 2017-06-14 RX ORDER — NALTREXONE HYDROCHLORIDE 50 MG/1
50 TABLET, FILM COATED ORAL DAILY
Status: ON HOLD | COMMUNITY
Start: 2017-06-14 | End: 2021-05-13

## 2017-06-14 RX ORDER — MIRTAZAPINE 30 MG/1
30 TABLET, FILM COATED ORAL AT BEDTIME
Qty: 30 TABLET | Status: ON HOLD | COMMUNITY
Start: 2017-06-14 | End: 2021-05-09

## 2017-06-14 RX ADMIN — BENZTROPINE MESYLATE 1 MG: 1 TABLET ORAL at 20:33

## 2017-06-14 RX ADMIN — DEXTRAN 70, AND HYPROMELLOSE 2910 1 DROP: 1; 3 SOLUTION/ DROPS OPHTHALMIC at 20:33

## 2017-06-14 RX ADMIN — GABAPENTIN 600 MG: 300 CAPSULE ORAL at 20:33

## 2017-06-14 RX ADMIN — GABAPENTIN 300 MG: 300 CAPSULE ORAL at 08:26

## 2017-06-14 RX ADMIN — MIRTAZAPINE 30 MG: 30 TABLET, FILM COATED ORAL at 20:33

## 2017-06-14 RX ADMIN — GABAPENTIN 600 MG: 300 CAPSULE ORAL at 13:45

## 2017-06-14 RX ADMIN — Medication 25 MG: at 08:26

## 2017-06-14 RX ADMIN — NICOTINE POLACRILEX 4 MG: 2 GUM, CHEWING ORAL at 16:26

## 2017-06-14 RX ADMIN — BUPROPION HYDROCHLORIDE 150 MG: 150 TABLET, FILM COATED, EXTENDED RELEASE ORAL at 08:26

## 2017-06-14 RX ADMIN — NICOTINE 1 PATCH: 21 PATCH, EXTENDED RELEASE TRANSDERMAL at 08:25

## 2017-06-14 RX ADMIN — HALOPERIDOL 5 MG: 5 TABLET ORAL at 20:33

## 2017-06-14 NOTE — PLAN OF CARE
Face to face end of shift report received from TOMEKA Staton. Rounding completed. Patient observed in room.     Rolly Kaiser  6/14/2017  7:24 AM

## 2017-06-14 NOTE — DISCHARGE SUMMARY
"Psychiatric Discharge Summary    Janusz Escalera MRN# [id number]   Age: 46 year old YOB: 1970     Date of Admission:  5/21/2017  Date of Discharge:  6/15/2017  Admitting Physician:  Erik Perez MD  Discharge Physician:  Tigre Frederick NP          Event Leading to Hospitalization and Hospital Stay   Janusz Escalera is a 46 year old male who presented via [facility] ED with reports of 2 suicide attempts the day of admission. Apparently attempted to hang himself both times while in the woods. Notes indicate that he blacked out after the first attempt and woke with a bloody nose, freed himself and called his sister to bring to the ED. Second attempt details are unclear. Recent Meth use. History of SIB by swallowing foreign objects. CT of neck and xray of abdomen completed with no abnormalities. Sister reported that he will often voluntarily admit self to a facility and then leave. He was placed on a hold.           Admitted to unit on a 72 hour hold. Utox positive for amphetamines, methamphetamine and THC. Provided a safe environment and therapeutic milieu. Resumed Haldol at bed, Trazodone at bed, and Remeron at bed but at lower dosing. This was eventually increased again. Trazodone switched to PRN when he complained of feeling \"hungover.\" Started on Naltrexone after he consumed inedible objects (colored pencils) and reported a frequent urge to eat inedible objects, resulting initially in relief but then consuming him with guilt. Liver enzymes monitored r/t hepatitis C diagnosis and they did elevate some, but not within 5 times the normal amount. This was discussed with Janusz and he decided that he would prefer to cut the dose in half and continue monitoring of his enzymes. He was also started on Wellbutrin XL for ADHD symptoms as he has previously done well on this, and Gabapentin for anxiety. Secondary to history of leaving CD treatment, civil commitment was filed and granted. Placement was " arranged.     At time of discharge, there is no evidence that patient is in immediate danger of self or others.        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    FINDINGS:  Cardiac silhouette and pulmonary vasculature are within  normal limits. The lungs are clear.  Bowel gas pattern is  unremarkable. There is no evidence of radiodense foreign body.    IMPRESSION:  1.  CLEAR LUNGS.  NO ACUTE ABNORMALITY IN THE ABDOMEN/PELVIS.    2.  NO DISTINCT EVIDENCE OF RADIODENSE FOREIGN BODY.  Exam Date: May 23, 2017 03:58:17 PM  Author: LAURA YUN  This report is final and signed     Glucose by meter   Result Value Ref Range    Glucose 94 70 - 99 mg/dL   Specific gravity urine   Result Value Ref Range    Specific Gravity Urine 1.009 1.003 - 1.035   Comprehensive metabolic panel   Result Value Ref Range    Sodium 140 133 - 144 mmol/L    Potassium 4.1 3.4 - 5.3 mmol/L    Chloride 107 94 - 109 mmol/L    Carbon Dioxide 27 20 - 32 mmol/L    Anion Gap 6 3 - 14 mmol/L    Glucose 79 70 - 99 mg/dL    Urea Nitrogen 16 7 - 30 mg/dL    Creatinine 0.74 0.66 - 1.25 mg/dL    GFR Estimate >90  Non  GFR Calc   >60 mL/min/1.7m2    GFR Estimate If Black >90   GFR Calc   >60 mL/min/1.7m2    Calcium 8.4 (L) 8.5 - 10.1 mg/dL    Bilirubin Total 0.3 0.2 - 1.3 mg/dL    Albumin 3.3 (L) 3.4 - 5.0 g/dL    Protein Total 7.4 6.8 - 8.8 g/dL    Alkaline Phosphatase 105 40 - 150 U/L     (H) 0 - 70 U/L     (H) 0 - 45 U/L             Discharge Medications:     Current Discharge Medication List      START taking these medications    Details   gabapentin (NEURONTIN) 300 MG capsule Take 2 capsules (600 mg) by mouth 3 times daily  Qty: 90 capsule      buPROPion (WELLBUTRIN XL) 150 MG 24 hr tablet Take 1 tablet (150 mg) by mouth daily  Qty: 30 tablet      naltrexone (DEPADE;REVIA) 50 MG tablet Take 1/2 tab by mouth daily.      benztropine (COGENTIN) 1 MG tablet Take 1 tablet (1 mg) by mouth At Bedtime      nicotine polacrilex (NICORETTE) 2 MG gum Place 1-2 each (2-4 mg) inside cheek every hour as needed for other (nicotine withdrawal symptoms)  Qty: 30 tablet         CONTINUE these medications which have CHANGED    Details   mirtazapine (REMERON) 30 MG tablet Take 1 tablet (30 mg) by mouth At Bedtime  Qty: 30 tablet      traZODone (DESYREL) 50 MG tablet Take 2 tablets (100 mg) by mouth At Bedtime  Qty: 90 tablet      haloperidol (HALDOL) 5 MG tablet Take 1 tablet (5 mg) by mouth 3 times daily as needed for agitation                Psychiatric Examination:   Appearance:  awake, alert and adequately groomed  Attitude:  cooperative  Eye Contact:  good  Mood:  good  Affect:  mood congruent  Speech:  clear, coherent  Psychomotor Behavior:  no evidence of tardive dyskinesia, dystonia, or tics  Thought Process:  logical, linear and goal oriented  Associations:  no loose associations  Thought Content:  no evidence of suicidal ideation or homicidal ideation and no evidence of psychotic thought  Insight:  good  Judgment:  intact  Oriented to:  time, person, and place  Attention Span and Concentration:  intact  Recent and Remote Memory:  intact  Fund of Knowledge: appropriate  Muscle Strength and Tone: normal  Gait and Station: Normal  Perception: No perceptual disorder       Discharge Plan:   Discharge to [facility]. [facility] arranged transportation.     Psychiatry Follow-up:      [facility]  Case  Manager - Flaco [phone]  [address] [place] [id number]  Phone [phone]     Fax- [phone]      Attestation:  The patient has been seen and evaluated by me,  Tigre Frederick NP         Discharge Services Provided:    35 minutes spent on discharge services, including:  Final examination of patient.  Review and discussion of Hospital stay.  Instructions for continued outpatient care/goals.  Preparation of discharge records.  Preparation of medications refills and new prescriptions.  Preparation of Applicable referral forms.

## 2017-06-14 NOTE — PLAN OF CARE
Problem: Goal Outcome Summary  Goal: Goal Outcome Summary  He is up on the unit. He denies feeling depressed or suicidal. He has some anxiety related to his discharge tomorrow. He is maintaining appropriate boundaries with staff and peers. He is happy about his upcoming discharge.     1850: Flaco with [place] [employer] called and said that transportation to [facility] is scheduled with [facility] secure transport and they will be here at 0800. An early breakfast and bag lunch was ordered for the patient. Patient is aware of this information.     Problem: Anxiety (Adult)  Goal: Reduction/Resolution  Patient will verbalize a decrease in anxiety by d/c.   Patient will try one coping skill before using a PRN.   He continues with some anxiety. He is anticipating discharge tomorrow.    Problem: Additional Goals: Nursing Focus  Goal: 1. Patient Goal: Nursing Focus  Patient has a history of swallowing objects prior to hospitalization.   Patient will not self harm by swallowing objects while inpatient.   He has not swallowed any foreign objects

## 2017-06-14 NOTE — PLAN OF CARE
Problem: Discharge Planning  Goal: Discharge Planning (Adult, OB, Behavioral, Peds)  Writer called [facility] and was told that pt was referred to the [facility] who have an open bed.  They are reviewing his case and they will call us if he is accepted.     [facility] accepted pt for tomorrow- they are contacting the pt's  Flaco Sharma to arrange transport.     Writer left a message for Flaco requesting he call with time pt is being picked up so we can have pt ready to go.

## 2017-06-14 NOTE — PLAN OF CARE
Problem: Goal Outcome Summary  Goal: Goal Outcome Summary  Outcome: No Change  Pt has been in bed with eyes closed and regular respirations X 7 hours. 15 minute and PRN checks all night. Nicotine patch in place to right shoulder. No complaints offered. Will continue to monitor.         Brionna Correa  6/14/2017  5:05 AM

## 2017-06-14 NOTE — PLAN OF CARE
Face to face end of shift report received from Rolly OBANDO RN. Rounding completed. Patient observed in bed resting.     Brionna Correa  6/14/2017  12:00 AM

## 2017-06-14 NOTE — PLAN OF CARE
Problem: Goal Outcome Summary  Goal: Goal Outcome Summary  He is up on the unit for breakfast. He denies feeling depressed, anxious, suicidal or having any hallucinations or racing thoughts. He is hopeful about discharging soon to [facility]. He is attending groups this morning and has a bright animated affect. Nurse to nurse report given to nurse at [facility].    Problem: Anxiety (Adult)  Goal: Reduction/Resolution  Patient will verbalize a decrease in anxiety by d/c.   Patient will try one coping skill before using a PRN.   He states that his anxiety is under control at this time.    Problem: Additional Goals: Nursing Focus  Goal: 1. Patient Goal: Nursing Focus  Patient has a history of swallowing objects prior to hospitalization.   Patient will not self harm by swallowing objects while inpatient.   He has not swallowed any foreign objects today.

## 2017-06-14 NOTE — PLAN OF CARE
"Problem: Goal Outcome Summary  Goal: Goal Outcome Summary  He is up on the unit and is social with others. He denies feeling depressed or suicidal. He has some anxiety but is feeling \"good\". He attends some groups but mostly spends time in his room and in the lounge area. He does play cribbage with another patient. [facility] updated and will review information and send to [facility].     Problem: Anxiety (Adult)  Goal: Reduction/Resolution  Patient will verbalize a decrease in anxiety by d/c.   Patient will try one coping skill before using a PRN.   His anxiety is \"good\". He is not attending most groups but is attending right now. He has been up on the unit and playing cribbage with another patient.     Problem: Additional Goals: Nursing Focus  Goal: 1. Patient Goal: Nursing Focus  Patient has a history of swallowing objects prior to hospitalization.   Patient will not self harm by swallowing objects while inpatient.   He contracts for safety. He has not swallowed any foreign objects.      "

## 2017-06-14 NOTE — PLAN OF CARE
Problem: General Plan of Care (Inpatient Behavioral)  Goal: Team Discussion  Team Plan:   Outcome: No Change  BEHAVIORAL TEAM DISCUSSION     Participants: Imani More, Krystyna Giles, Aislinn Malik, Erica Robbins, Ronel Winters, Tigre Frederick, Leslie Solorio, Hien Montejo, Paige Gaytan, Rolly Kaiser  Progress: Attending programs as participating in milieu.  Continued Stay Criteria/Rationale: Unsafe to discharge due to readmittance rate.  Medical/Physical: Reorder labs to see ALT/AST levels.  Precautions:   Behavioral Orders   Procedures     Code 1 - Restrict to Unit     Routine Programming       As clinically indicated     Plan: To place in a higher level of care where is will be safe.  Rationale for change in precautions or plan: none.

## 2017-06-14 NOTE — PLAN OF CARE
Face to face end of shift report complete. Rounding completed. This nurse is continuing care for this patient. Patient observed in group.    Rolly Kaiser  6/14/2017  3:24 PM

## 2017-06-15 VITALS
HEART RATE: 79 BPM | OXYGEN SATURATION: 98 % | HEIGHT: 71 IN | DIASTOLIC BLOOD PRESSURE: 74 MMHG | WEIGHT: 197.31 LBS | SYSTOLIC BLOOD PRESSURE: 131 MMHG | BODY MASS INDEX: 27.62 KG/M2 | TEMPERATURE: 98.8 F | RESPIRATION RATE: 18 BRPM

## 2017-06-15 PROCEDURE — 25000132 ZZH RX MED GY IP 250 OP 250 PS 637: Performed by: NURSE PRACTITIONER

## 2017-06-15 RX ADMIN — Medication 25 MG: at 08:02

## 2017-06-15 RX ADMIN — BUPROPION HYDROCHLORIDE 150 MG: 150 TABLET, FILM COATED, EXTENDED RELEASE ORAL at 08:01

## 2017-06-15 RX ADMIN — NICOTINE 1 PATCH: 21 PATCH, EXTENDED RELEASE TRANSDERMAL at 08:02

## 2017-06-15 RX ADMIN — ACETAMINOPHEN 650 MG: 325 TABLET, FILM COATED ORAL at 08:02

## 2017-06-15 RX ADMIN — GABAPENTIN 600 MG: 300 CAPSULE ORAL at 08:01

## 2017-06-15 NOTE — PLAN OF CARE
Problem: Discharge Planning  Goal: Discharge Planning (Adult, OB, Behavioral, Peds)  Outcome: Adequate for Discharge Date Met:  06/15/17  Discharge instructions, including; demographic sheet, psychiatric evaluation, discharge summary, and AVS were faxed to these next level of care providers [facility] case management

## 2017-06-15 NOTE — PLAN OF CARE
Patient in bed all shift with eyes closed and regular respirations observed. 15 minute and PRN checks performed all night. Patient woke up at 0530 and showered as he is getting ready for d/c. AVS reviewed with patient and patient verbalized his understanding. No c/o t/o the night. He denies SI this am. He states that he has his sister and her family and his ex spouse for a support system. He will utilize walking, music, deep breathing and journaling for his coping skills. He denies having access to firearms. He is requesting that he gets his am medications before d/c and writer assured him that he will. He is in good spirits and is looking forward to the next phase of his journey. He was very thankful for staff here and how he was treated during his stay.      Jaqueline Underwood RN  6/15/17  6:09 AM

## 2017-06-15 NOTE — PLAN OF CARE
Discharge Note    Patient Discharged to [facility] on 6/15/2017 8:09 AM via Health plan transportation ([facility]) accompanied by discharge planner and  from [facility].     Patient informed of discharge instructions in AVS. patient verbalizes understanding and denies having any questions pertaining to AVS. Patient stable at time of discharge. Patient denies SI, HI, and thoughts of self harm at time of discharge. All personal belongings returned to patient. Discharge prescriptions sent to [facility] via electronic communication. Psych evaluation, history and physical, AVS, and discharge summary faxed to next level of care- by .     Mary Rao  6/15/2017  8:10 AM

## 2017-06-15 NOTE — PLAN OF CARE
Face to face end of shift report received from Jaqueline SIMS RN. Rounding completed. Patient observed.     Mary Rao  6/15/2017  7:38 AM

## 2017-06-15 NOTE — PLAN OF CARE
Problem: Discharge Planning  Goal: Discharge Planning (Adult, OB, Behavioral, Peds)  Pt is discharging at the recommendation of the treatment team. Pt is discharging to [facility] transported by [facility]. Pt denies having any thoughts of hurting themself or anyone else. Pt denies anxiety or depression.  Discharge instructions, including; demographic sheet, psychiatric evaluation, discharge summary, and AVS were faxed to these next level of care providers.

## 2018-08-30 ENCOUNTER — TRANSFERRED RECORDS (OUTPATIENT)
Dept: HEALTH INFORMATION MANAGEMENT | Facility: CLINIC | Age: 48
End: 2018-08-30

## 2018-09-28 ENCOUNTER — TRANSFERRED RECORDS (OUTPATIENT)
Dept: HEALTH INFORMATION MANAGEMENT | Facility: CLINIC | Age: 48
End: 2018-09-28

## 2018-10-09 ENCOUNTER — TRANSFERRED RECORDS (OUTPATIENT)
Dept: HEALTH INFORMATION MANAGEMENT | Facility: CLINIC | Age: 48
End: 2018-10-09

## 2018-12-12 ENCOUNTER — TRANSFERRED RECORDS (OUTPATIENT)
Dept: HEALTH INFORMATION MANAGEMENT | Facility: CLINIC | Age: 48
End: 2018-12-12

## 2020-03-09 ENCOUNTER — TRANSFERRED RECORDS (OUTPATIENT)
Dept: HEALTH INFORMATION MANAGEMENT | Facility: CLINIC | Age: 50
End: 2020-03-09

## 2020-07-22 ENCOUNTER — TRANSFERRED RECORDS (OUTPATIENT)
Dept: HEALTH INFORMATION MANAGEMENT | Facility: CLINIC | Age: 50
End: 2020-07-22

## 2020-07-29 ENCOUNTER — TRANSFERRED RECORDS (OUTPATIENT)
Dept: HEALTH INFORMATION MANAGEMENT | Facility: CLINIC | Age: 50
End: 2020-07-29

## 2020-09-03 ENCOUNTER — TELEPHONE (OUTPATIENT)
Dept: BEHAVIORAL HEALTH | Facility: CLINIC | Age: 50
End: 2020-09-03

## 2020-09-03 NOTE — TELEPHONE ENCOUNTER
"S: Jane ([phone]) gave clinical saying pt was bib himself 8/28/20 to [facility] er due to swallowing 15 wires (notebook binder pieces and paper clips).  B: he had surgery (an EGD) and is on a medical unit. 2 Mucosal abrasions of esophagus were noted. Hx of swallowing razors and pens. Dxs: schizoaffective d/o, bipolar d/o, borderline personality d/o and hx of chem use. He recently went to [facility] for detox and due to SI. Hx of many SA's by laying on train tracks, od'ing and trying to hang himself. Hx of psych admit at [facility] in July and many others prior to then.  He denies he swallowed the notebook binder pieces as a SA per Jane, though faxed clinical says he walked from [place] to [place], finding wires on the way, and says he was suicidal and wanted to \"walk until I drop.\" . He informed staff 9/1 when he had the desire to ingest another object. He said he was off his meds for 2 wks prior to admit.Utox pos for marij. He admits to marij, mushrooms and meth use a week before admit. Pt is homeless. Covid test neg. No chronic med prob's except hx of hypertension and Hep C. Hx of bowel perforation. 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 Hx of etoh abuse and report he fell while intoxicated in May causing a C1 fx. No longer needs to wear the C-collar. Abnormal labs.   A: med cleared, eating, drinking, no IV's, walking indep, using bathroom indep, wants help, denies SI, coop, vol  R: left msg for Tigre at 9:58 am        mary  Per Tigre, pt would need a 1:1 staff and she said she discussed this with management who said they currently do not have staff to do this. Author informed intake supervisor. Purvi Gibson at 10:36 am for possible [facility] admit. mary Gibson said she will review the chart and will call back. mary  Per supervisor in intake, he spoke with [facility] who felt pt needs a 1:1 staff and said [facility] currently does not have the staff to provide this. Author " called and informed Jane. mary

## 2021-05-09 ENCOUNTER — TELEPHONE (OUTPATIENT)
Dept: BEHAVIORAL HEALTH | Facility: CLINIC | Age: 51
End: 2021-05-09

## 2021-05-09 ENCOUNTER — HOSPITAL ENCOUNTER (EMERGENCY)
Facility: CLINIC | Age: 51
Discharge: PSYCHIATRIC OR SUBSTANCE USE FACILITY | End: 2021-05-09
Attending: FAMILY MEDICINE | Admitting: FAMILY MEDICINE
Payer: COMMERCIAL

## 2021-05-09 ENCOUNTER — HOSPITAL ENCOUNTER (INPATIENT)
Facility: CLINIC | Age: 51
LOS: 5 days | Discharge: HOME | End: 2021-05-14
Attending: PSYCHIATRY & NEUROLOGY | Admitting: PSYCHIATRY & NEUROLOGY
Payer: COMMERCIAL

## 2021-05-09 VITALS
RESPIRATION RATE: 31 BRPM | OXYGEN SATURATION: 96 % | HEART RATE: 81 BPM | WEIGHT: 188 LBS | DIASTOLIC BLOOD PRESSURE: 71 MMHG | SYSTOLIC BLOOD PRESSURE: 116 MMHG | BODY MASS INDEX: 26.22 KG/M2

## 2021-05-09 DIAGNOSIS — R45.851 SUICIDAL IDEATION: ICD-10-CM

## 2021-05-09 DIAGNOSIS — E55.9 VITAMIN D DEFICIENCY: ICD-10-CM

## 2021-05-09 DIAGNOSIS — R74.8 ELEVATED CK: ICD-10-CM

## 2021-05-09 DIAGNOSIS — I10 ESSENTIAL HYPERTENSION, BENIGN: ICD-10-CM

## 2021-05-09 DIAGNOSIS — F10.930 ALCOHOL WITHDRAWAL, UNCOMPLICATED (H): ICD-10-CM

## 2021-05-09 DIAGNOSIS — F19.10 POLYSUBSTANCE ABUSE (H): ICD-10-CM

## 2021-05-09 DIAGNOSIS — F19.10 POLYSUBSTANCE ABUSE (H): Primary | ICD-10-CM

## 2021-05-09 LAB
ALBUMIN SERPL-MCNC: 4 G/DL (ref 3.4–5)
ALBUMIN UR-MCNC: 30 MG/DL
ALP SERPL-CCNC: 107 U/L (ref 40–150)
ALT SERPL W P-5'-P-CCNC: 36 U/L (ref 0–70)
AMPHETAMINES UR QL: ABNORMAL NG/ML
ANION GAP SERPL CALCULATED.3IONS-SCNC: 13 MMOL/L (ref 3–14)
APPEARANCE UR: CLEAR
APTT PPP: 27 SEC (ref 22–37)
AST SERPL W P-5'-P-CCNC: 73 U/L (ref 0–45)
BACTERIA #/AREA URNS HPF: ABNORMAL /HPF
BARBITURATES UR QL SCN: NOT DETECTED NG/ML
BASOPHILS # BLD AUTO: 0.1 10E9/L (ref 0–0.2)
BASOPHILS NFR BLD AUTO: 0.5 %
BENZODIAZ UR QL SCN: ABNORMAL NG/ML
BILIRUB SERPL-MCNC: 1.8 MG/DL (ref 0.2–1.3)
BILIRUB UR QL STRIP: NEGATIVE
BUN SERPL-MCNC: 23 MG/DL (ref 7–30)
BUPRENORPHINE UR QL: NOT DETECTED NG/ML
CALCIUM SERPL-MCNC: 9.1 MG/DL (ref 8.5–10.1)
CANNABINOIDS UR QL: ABNORMAL NG/ML
CHLORIDE SERPL-SCNC: 103 MMOL/L (ref 94–109)
CK SERPL-CCNC: 1054 U/L (ref 30–300)
CK SERPL-CCNC: 1062 U/L (ref 30–300)
CK SERPL-CCNC: 1383 U/L (ref 30–300)
CO2 SERPL-SCNC: 23 MMOL/L (ref 20–32)
COCAINE UR QL SCN: NOT DETECTED NG/ML
COLOR UR AUTO: YELLOW
CREAT SERPL-MCNC: 0.81 MG/DL (ref 0.66–1.25)
D-METHAMPHET UR QL: ABNORMAL NG/ML
DIFFERENTIAL METHOD BLD: ABNORMAL
EOSINOPHIL # BLD AUTO: 0.1 10E9/L (ref 0–0.7)
EOSINOPHIL NFR BLD AUTO: 0.5 %
ERYTHROCYTE [DISTWIDTH] IN BLOOD BY AUTOMATED COUNT: 12.8 % (ref 10–15)
ETHANOL SERPL-MCNC: <0.01 G/DL
GFR SERPL CREATININE-BSD FRML MDRD: >90 ML/MIN/{1.73_M2}
GLUCOSE SERPL-MCNC: 66 MG/DL (ref 70–99)
GLUCOSE UR STRIP-MCNC: NEGATIVE MG/DL
HCT VFR BLD AUTO: 38.8 % (ref 40–53)
HGB BLD-MCNC: 13.3 G/DL (ref 13.3–17.7)
HGB UR QL STRIP: ABNORMAL
HYALINE CASTS #/AREA URNS LPF: 1 /LPF (ref 0–2)
IMM GRANULOCYTES # BLD: 0 10E9/L (ref 0–0.4)
IMM GRANULOCYTES NFR BLD: 0.2 %
INR PPP: 1.04 (ref 0.86–1.14)
KETONES UR STRIP-MCNC: 80 MG/DL
LABORATORY COMMENT REPORT: NORMAL
LEUKOCYTE ESTERASE UR QL STRIP: NEGATIVE
LYMPHOCYTES # BLD AUTO: 1.1 10E9/L (ref 0.8–5.3)
LYMPHOCYTES NFR BLD AUTO: 10.9 %
MAGNESIUM SERPL-MCNC: 2.1 MG/DL (ref 1.6–2.3)
MCH RBC QN AUTO: 30.6 PG (ref 26.5–33)
MCHC RBC AUTO-ENTMCNC: 34.3 G/DL (ref 31.5–36.5)
MCV RBC AUTO: 89 FL (ref 78–100)
METHADONE UR QL SCN: NOT DETECTED NG/ML
MONOCYTES # BLD AUTO: 0.5 10E9/L (ref 0–1.3)
MONOCYTES NFR BLD AUTO: 5 %
MUCOUS THREADS #/AREA URNS LPF: PRESENT /LPF
NEUTROPHILS # BLD AUTO: 8 10E9/L (ref 1.6–8.3)
NEUTROPHILS NFR BLD AUTO: 82.9 %
NITRATE UR QL: NEGATIVE
NRBC # BLD AUTO: 0 10*3/UL
NRBC BLD AUTO-RTO: 0 /100
OPIATES UR QL SCN: NOT DETECTED NG/ML
OXYCODONE UR QL SCN: NOT DETECTED NG/ML
PCP UR QL SCN: NOT DETECTED NG/ML
PH UR STRIP: 5 PH (ref 5–7)
PLATELET # BLD AUTO: 215 10E9/L (ref 150–450)
POTASSIUM SERPL-SCNC: 3.8 MMOL/L (ref 3.4–5.3)
PROPOXYPH UR QL: NOT DETECTED NG/ML
PROT SERPL-MCNC: 8.3 G/DL (ref 6.8–8.8)
RBC # BLD AUTO: 4.34 10E12/L (ref 4.4–5.9)
RBC #/AREA URNS AUTO: <1 /HPF (ref 0–2)
SARS-COV-2 RNA RESP QL NAA+PROBE: NEGATIVE
SODIUM SERPL-SCNC: 139 MMOL/L (ref 133–144)
SOURCE: ABNORMAL
SP GR UR STRIP: 1.03 (ref 1–1.03)
SPECIMEN SOURCE: NORMAL
TRICYCLICS UR QL SCN: NOT DETECTED NG/ML
UROBILINOGEN UR STRIP-MCNC: 0 MG/DL (ref 0–2)
WBC # BLD AUTO: 9.6 10E9/L (ref 4–11)
WBC #/AREA URNS AUTO: 3 /HPF (ref 0–5)

## 2021-05-09 PROCEDURE — 258N000003 HC RX IP 258 OP 636: Performed by: FAMILY MEDICINE

## 2021-05-09 PROCEDURE — 83735 ASSAY OF MAGNESIUM: CPT | Performed by: FAMILY MEDICINE

## 2021-05-09 PROCEDURE — 124N000002 HC R&B MH UMMC

## 2021-05-09 PROCEDURE — 96361 HYDRATE IV INFUSION ADD-ON: CPT | Performed by: FAMILY MEDICINE

## 2021-05-09 PROCEDURE — 90791 PSYCH DIAGNOSTIC EVALUATION: CPT

## 2021-05-09 PROCEDURE — 82077 ASSAY SPEC XCP UR&BREATH IA: CPT | Performed by: FAMILY MEDICINE

## 2021-05-09 PROCEDURE — 99285 EMERGENCY DEPT VISIT HI MDM: CPT | Performed by: FAMILY MEDICINE

## 2021-05-09 PROCEDURE — 85730 THROMBOPLASTIN TIME PARTIAL: CPT | Performed by: FAMILY MEDICINE

## 2021-05-09 PROCEDURE — 81001 URINALYSIS AUTO W/SCOPE: CPT | Performed by: FAMILY MEDICINE

## 2021-05-09 PROCEDURE — C9803 HOPD COVID-19 SPEC COLLECT: HCPCS | Performed by: FAMILY MEDICINE

## 2021-05-09 PROCEDURE — 85610 PROTHROMBIN TIME: CPT | Performed by: FAMILY MEDICINE

## 2021-05-09 PROCEDURE — 87635 SARS-COV-2 COVID-19 AMP PRB: CPT | Performed by: FAMILY MEDICINE

## 2021-05-09 PROCEDURE — 96374 THER/PROPH/DIAG INJ IV PUSH: CPT | Performed by: FAMILY MEDICINE

## 2021-05-09 PROCEDURE — 80053 COMPREHEN METABOLIC PANEL: CPT | Performed by: FAMILY MEDICINE

## 2021-05-09 PROCEDURE — 99285 EMERGENCY DEPT VISIT HI MDM: CPT | Mod: 25 | Performed by: FAMILY MEDICINE

## 2021-05-09 PROCEDURE — 85025 COMPLETE CBC W/AUTO DIFF WBC: CPT | Performed by: FAMILY MEDICINE

## 2021-05-09 PROCEDURE — 250N000011 HC RX IP 250 OP 636: Performed by: FAMILY MEDICINE

## 2021-05-09 PROCEDURE — 80306 DRUG TEST PRSMV INSTRMNT: CPT | Performed by: FAMILY MEDICINE

## 2021-05-09 PROCEDURE — 36415 COLL VENOUS BLD VENIPUNCTURE: CPT | Performed by: FAMILY MEDICINE

## 2021-05-09 PROCEDURE — 96376 TX/PRO/DX INJ SAME DRUG ADON: CPT | Performed by: FAMILY MEDICINE

## 2021-05-09 PROCEDURE — 82550 ASSAY OF CK (CPK): CPT | Performed by: FAMILY MEDICINE

## 2021-05-09 RX ORDER — LANOLIN ALCOHOL/MO/W.PET/CERES
100 CREAM (GRAM) TOPICAL DAILY
Status: DISCONTINUED | OUTPATIENT
Start: 2021-05-10 | End: 2021-05-14 | Stop reason: HOSPADM

## 2021-05-09 RX ORDER — ACETAMINOPHEN 325 MG/1
650 TABLET ORAL EVERY 4 HOURS PRN
Status: DISCONTINUED | OUTPATIENT
Start: 2021-05-09 | End: 2021-05-14 | Stop reason: HOSPADM

## 2021-05-09 RX ORDER — FOLIC ACID 1 MG/1
1 TABLET ORAL DAILY
Status: DISCONTINUED | OUTPATIENT
Start: 2021-05-10 | End: 2021-05-14 | Stop reason: HOSPADM

## 2021-05-09 RX ORDER — SODIUM CHLORIDE 9 MG/ML
INJECTION, SOLUTION INTRAVENOUS CONTINUOUS
Status: DISCONTINUED | OUTPATIENT
Start: 2021-05-09 | End: 2021-05-09 | Stop reason: HOSPADM

## 2021-05-09 RX ORDER — OLANZAPINE 10 MG/1
10 TABLET ORAL 3 TIMES DAILY PRN
Status: DISCONTINUED | OUTPATIENT
Start: 2021-05-09 | End: 2021-05-14 | Stop reason: HOSPADM

## 2021-05-09 RX ORDER — MULTIPLE VITAMINS W/ MINERALS TAB 9MG-400MCG
1 TAB ORAL DAILY
Status: DISCONTINUED | OUTPATIENT
Start: 2021-05-10 | End: 2021-05-14 | Stop reason: HOSPADM

## 2021-05-09 RX ORDER — DIAZEPAM 5 MG
5-20 TABLET ORAL EVERY 30 MIN PRN
Status: DISCONTINUED | OUTPATIENT
Start: 2021-05-09 | End: 2021-05-11

## 2021-05-09 RX ORDER — TRAZODONE HYDROCHLORIDE 50 MG/1
50 TABLET, FILM COATED ORAL
Status: DISCONTINUED | OUTPATIENT
Start: 2021-05-09 | End: 2021-05-14 | Stop reason: HOSPADM

## 2021-05-09 RX ORDER — LORAZEPAM 2 MG/ML
1 INJECTION INTRAMUSCULAR
Status: DISCONTINUED | OUTPATIENT
Start: 2021-05-09 | End: 2021-05-09 | Stop reason: HOSPADM

## 2021-05-09 RX ORDER — AMOXICILLIN 250 MG
1 CAPSULE ORAL 2 TIMES DAILY PRN
Status: DISCONTINUED | OUTPATIENT
Start: 2021-05-09 | End: 2021-05-14 | Stop reason: HOSPADM

## 2021-05-09 RX ORDER — HYDROXYZINE HYDROCHLORIDE 25 MG/1
25 TABLET, FILM COATED ORAL EVERY 4 HOURS PRN
Status: DISCONTINUED | OUTPATIENT
Start: 2021-05-09 | End: 2021-05-14 | Stop reason: HOSPADM

## 2021-05-09 RX ORDER — ATENOLOL 50 MG/1
50 TABLET ORAL DAILY PRN
Status: DISCONTINUED | OUTPATIENT
Start: 2021-05-09 | End: 2021-05-14 | Stop reason: HOSPADM

## 2021-05-09 RX ORDER — OLANZAPINE 10 MG/2ML
10 INJECTION, POWDER, FOR SOLUTION INTRAMUSCULAR 3 TIMES DAILY PRN
Status: DISCONTINUED | OUTPATIENT
Start: 2021-05-09 | End: 2021-05-14 | Stop reason: HOSPADM

## 2021-05-09 RX ORDER — NALTREXONE HYDROCHLORIDE 50 MG/1
50 TABLET, FILM COATED ORAL DAILY
Status: DISCONTINUED | OUTPATIENT
Start: 2021-05-10 | End: 2021-05-14 | Stop reason: HOSPADM

## 2021-05-09 RX ORDER — MAGNESIUM HYDROXIDE/ALUMINUM HYDROXICE/SIMETHICONE 120; 1200; 1200 MG/30ML; MG/30ML; MG/30ML
30 SUSPENSION ORAL EVERY 4 HOURS PRN
Status: DISCONTINUED | OUTPATIENT
Start: 2021-05-09 | End: 2021-05-14 | Stop reason: HOSPADM

## 2021-05-09 RX ORDER — ARIPIPRAZOLE 2 MG/1
1 TABLET ORAL DAILY
Status: ON HOLD | COMMUNITY
End: 2021-05-13

## 2021-05-09 RX ADMIN — SODIUM CHLORIDE 1000 ML: 9 INJECTION, SOLUTION INTRAVENOUS at 14:19

## 2021-05-09 RX ADMIN — SODIUM CHLORIDE 1000 ML: 9 INJECTION, SOLUTION INTRAVENOUS at 16:18

## 2021-05-09 RX ADMIN — SODIUM CHLORIDE 1000 ML: 9 INJECTION, SOLUTION INTRAVENOUS at 14:53

## 2021-05-09 RX ADMIN — LORAZEPAM 1 MG: 2 INJECTION INTRAMUSCULAR; INTRAVENOUS at 18:23

## 2021-05-09 RX ADMIN — LORAZEPAM 1 MG: 2 INJECTION INTRAMUSCULAR; INTRAVENOUS at 13:12

## 2021-05-09 ASSESSMENT — ACTIVITIES OF DAILY LIVING (ADL)
WEAR_GLASSES_OR_BLIND: YES
HYGIENE/GROOMING: INDEPENDENT
WALKING_OR_CLIMBING_STAIRS_DIFFICULTY: NO
DIFFICULTY_EATING/SWALLOWING: NO
HEARING_DIFFICULTY_OR_DEAF: NO
ORAL_HYGIENE: INDEPENDENT
DRESSING/BATHING_DIFFICULTY: NO
CONCENTRATING,_REMEMBERING_OR_MAKING_DECISIONS_DIFFICULTY: NO
TOILETING_ISSUES: NO
DOING_ERRANDS_INDEPENDENTLY_DIFFICULTY: NO
DRESS: INDEPENDENT
FALL_HISTORY_WITHIN_LAST_SIX_MONTHS: NO
PATIENT_/_FAMILY_COMMUNICATION_STYLE: SPOKEN LANGUAGE (ENGLISH OR BILINGUAL)
LAUNDRY: UNABLE TO COMPLETE
DIFFICULTY_COMMUNICATING: NO

## 2021-05-09 ASSESSMENT — MIFFLIN-ST. JEOR: SCORE: 1763.04

## 2021-05-09 NOTE — ED PROVIDER NOTES
Signout received at change of shift from Dr. Walt Herndon.  See his note for patient presenting details and work-up.  Patient had been medically cleared, but due to the elevation in his CK levels, there was some hesitancy accepted him at the psychiatric facility for inpatient treatment.  After assessments and thorough evaluation in the emergency room, where the patient was stable and had CK level trending down, plan was to draw a third CK level at 1800 and if trending down the patient would be officially medically cleared and be able to be transferred.    50-year-old male in no acute distress, is sitting calmly and cooperatively in a dark room, breathing easily, eating and drinking.  A repeat CK level was drawn and continues to trend down.  The patient is officially medically cleared.  He will be transported to [facility] for inpatient psychiatric admission.  Report was called and the patient was transported via EMS in no acute distress.     Kortney Melgoza,   05/09/21 5828

## 2021-05-09 NOTE — ED PROVIDER NOTES
History     Chief Complaint   Patient presents with     Suicidal     HPI  Januzs Escalera is a 50 year old male who presents with concerns of suicidal ideation with plan and alcohol and bath salt use.  Patient's had a long history of suicide attempts before including hanging's and overdoses in the past.  Patient is supposed to be on Abilify and other medications that he has not been taking for the last 2 weeks.  Patient states that he got sober and clean over the winter but over the last month or so he started using again.  In the last couple of days he started having thoughts of wanting to kill himself.  He [place] to the river yesterday and thought about jumping in but then got scared because he did not think anyone would find him.  He was backed down by the river again today thinking about jumping in or possibly hanging himself.  The police found him and took him to his nephew's house to bring him here to the emergency department.  Patient admits to last using bath salts 2 days ago they did want to use again yesterday because he hallucinates sometimes on those.  He has a history of alcohol use and abuse and felt like he was kind of withdrawing the last couple days.  He could not get a hold of regular alcohol so he drank Listerine.  Patient does not feel safe being alone right now.  Patient denies any current chest pain or shortness of breath or belly pain.    Allergies:  Allergies   Allergen Reactions     Bee Venom Anaphylaxis     Pt reported- has Epi Pen  Pt reported- has Epi Pen  Pt reported- has Epi Pen  Pt reported- has Epi Pen       Nuts Swelling     Tongue, throat swelling     Pistachio Nut Extract Skin Test Other (See Comments)     Tongue Swelling         Problem List:    Patient Active Problem List    Diagnosis Date Noted     Suicide attempt (H) 05/21/2017     Priority: Medium     Methamphetamine abuse (H) 12/23/2016     Priority: Medium     Depressive disorder 04/29/2016     Priority: Medium      Foreign body (FB) in soft tissue 04/23/2016     Priority: Medium     Suicidal behavior 06/17/2014     Priority: Medium     Swallowed foreign body 06/04/2014     Priority: Medium     Esophageal foreign body 06/01/2014     Priority: Medium     Foreign body ingestion 05/31/2014     Priority: Medium     Foreign body in alimentary tract 05/26/2014     Priority: Medium     S/P laparotomy 05/26/2014     Priority: Medium     Leucocytosis 05/26/2014     Priority: Medium     DVT prophylaxis 05/26/2014     Priority: Medium     Delirium 05/26/2014     Priority: Medium     Chronic hepatitis C (H) 05/25/2014     Priority: Medium     Tobacco use disorder 05/25/2014     Priority: Medium     Heroin use 05/25/2014     Priority: Medium     Marijuana use 05/25/2014     Priority: Medium        Past Medical History:    Past Medical History:   Diagnosis Date     Depressive disorder      Foreign body alimentary tract 05/2014     Hepatitis C      History of alcohol abuse      IV drug user      Nasal bones, closed fracture 1992     Personal history of tobacco use, presenting hazards to health      Polysubstance dependence (H)      Treatment        Past Surgical History:    Past Surgical History:   Procedure Laterality Date     ENDOSCOPIC UPPER GASTROINTESTINAL, REMOVE SUTURE  6/4/2014    Procedure: ENDOSCOPIC UPPER GASTROINTESTINAL, REMOVE SUTURE;  Surgeon: Rashard Ross MD;  Location: [facility] OR     ESOPHAGOSCOPY, GASTROSCOPY, DUODENOSCOPY (EGD), COMBINED  5/25/2014    Procedure: COMBINED ESOPHAGOSCOPY, GASTROSCOPY, DUODENOSCOPY (EGD);  Surgeon: Jourdan Tom MD;  Location:  OR     ESOPHAGOSCOPY, GASTROSCOPY, DUODENOSCOPY (EGD), COMBINED N/A 4/23/2016    Procedure: COMBINED ESOPHAGOSCOPY, GASTROSCOPY, DUODENOSCOPY (EGD);  Surgeon: Calixto Conklin MD;  Location: [facility] OR     LAPAROTOMY EXPLORATORY  5/25/2014    Procedure: LAPAROTOMY EXPLORATORY;  Surgeon: Jourdan oTm MD;  Location: [facility] OR       Family History:     Family History   Problem Relation Age of Onset     Family History Negative No family hx of         No pertinent family medical history       Social History:  Marital Status:  Single [1]  Social History     Tobacco Use     Smoking status: Current Every Day Smoker     Packs/day: 0.50     Types: Cigarettes     Smokeless tobacco: Former User   Substance Use Topics     Alcohol use: Yes     Comment:  Occasional     Drug use: Yes     Types: Methamphetamines, IV     Comment: heroin, synthetics. Acid  Benadryl 30 at a time.        Medications:    benztropine (COGENTIN) 1 MG tablet  buPROPion (WELLBUTRIN XL) 150 MG 24 hr tablet  gabapentin (NEURONTIN) 300 MG capsule  haloperidol (HALDOL) 5 MG tablet  mirtazapine (REMERON) 30 MG tablet  naltrexone (DEPADE;REVIA) 50 MG tablet  nicotine polacrilex (NICORETTE) 2 MG gum  traZODone (DESYREL) 50 MG tablet          Review of Systems   All other systems reviewed and are negative.      Physical Exam          Physical Exam  Vitals signs and nursing note reviewed.   Constitutional:       General: He is not in acute distress.     Appearance: He is well-developed. He is not diaphoretic.   HENT:      Head: Normocephalic and atraumatic.      Nose: Nose normal.      Mouth/Throat:      Pharynx: No oropharyngeal exudate.   Eyes:      General: No scleral icterus.     Conjunctiva/sclera: Conjunctivae normal.      Pupils: Pupils are equal, round, and reactive to light.   Neck:      Musculoskeletal: Normal range of motion.   Cardiovascular:      Rate and Rhythm: Normal rate and regular rhythm.      Heart sounds: Normal heart sounds. No murmur. No friction rub.   Pulmonary:      Effort: Pulmonary effort is normal. No respiratory distress.      Breath sounds: Normal breath sounds. No wheezing or rales.   Abdominal:      General: Bowel sounds are normal. There is no distension.      Palpations: Abdomen is soft. There is no mass.      Tenderness: There is no abdominal tenderness. There is no guarding  or rebound.   Musculoskeletal: Normal range of motion.         General: No tenderness.   Skin:     General: Skin is warm.      Capillary Refill: Capillary refill takes less than 2 seconds.      Findings: No rash.   Neurological:      General: No focal deficit present.      Mental Status: He is alert and oriented to person, place, and time.   Psychiatric:         Mood and Affect: Mood is anxious.         Speech: Speech is rapid and pressured.         Behavior: Behavior is cooperative.         Thought Content: Thought content includes suicidal ideation. Thought content includes suicidal plan.         Judgment: Judgment is impulsive.         ED Course        Procedures    Results for orders placed or performed during the hospital encounter of 05/09/21 (from the past 24 hour(s))   CBC with platelets differential   Result Value Ref Range    WBC 9.6 4.0 - 11.0 10e9/L    RBC Count 4.34 (L) 4.4 - 5.9 10e12/L    Hemoglobin 13.3 13.3 - 17.7 g/dL    Hematocrit 38.8 (L) 40.0 - 53.0 %    MCV 89 78 - 100 fl    MCH 30.6 26.5 - 33.0 pg    MCHC 34.3 31.5 - 36.5 g/dL    RDW 12.8 10.0 - 15.0 %    Platelet Count 215 150 - 450 10e9/L    Diff Method Automated Method     % Neutrophils 82.9 %    % Lymphocytes 10.9 %    % Monocytes 5.0 %    % Eosinophils 0.5 %    % Basophils 0.5 %    % Immature Granulocytes 0.2 %    Nucleated RBCs 0 0 /100    Absolute Neutrophil 8.0 1.6 - 8.3 10e9/L    Absolute Lymphocytes 1.1 0.8 - 5.3 10e9/L    Absolute Monocytes 0.5 0.0 - 1.3 10e9/L    Absolute Eosinophils 0.1 0.0 - 0.7 10e9/L    Absolute Basophils 0.1 0.0 - 0.2 10e9/L    Abs Immature Granulocytes 0.0 0 - 0.4 10e9/L    Absolute Nucleated RBC 0.0    Comprehensive metabolic panel   Result Value Ref Range    Sodium 139 133 - 144 mmol/L    Potassium 3.8 3.4 - 5.3 mmol/L    Chloride 103 94 - 109 mmol/L    Carbon Dioxide 23 20 - 32 mmol/L    Anion Gap 13 3 - 14 mmol/L    Glucose 66 (L) 70 - 99 mg/dL    Urea Nitrogen 23 7 - 30 mg/dL    Creatinine 0.81 0.66 -  1.25 mg/dL    GFR Estimate >90 >60 mL/min/[1.73_m2]    GFR Estimate If Black >90 >60 mL/min/[1.73_m2]    Calcium 9.1 8.5 - 10.1 mg/dL    Bilirubin Total 1.8 (H) 0.2 - 1.3 mg/dL    Albumin 4.0 3.4 - 5.0 g/dL    Protein Total 8.3 6.8 - 8.8 g/dL    Alkaline Phosphatase 107 40 - 150 U/L    ALT 36 0 - 70 U/L    AST 73 (H) 0 - 45 U/L   INR   Result Value Ref Range    INR 1.04 0.86 - 1.14   PTT   Result Value Ref Range    PTT 27 22 - 37 sec   CK total   Result Value Ref Range    CK Total 1,383 (HH) 30 - 300 U/L   Alcohol ethyl   Result Value Ref Range    Ethanol g/dL <0.01 <0.01 g/dL   Magnesium   Result Value Ref Range    Magnesium 2.1 1.6 - 2.3 mg/dL   Asymptomatic SARS-CoV-2 COVID-19 Virus (Coronavirus) by PCR    Specimen: Nasopharyngeal   Result Value Ref Range    SARS-CoV-2 Virus Specimen Source Nasopharyngeal     SARS-CoV-2 PCR Result NEGATIVE     SARS-CoV-2 PCR Comment (Note)    UA reflex to Microscopic   Result Value Ref Range    Color Urine Yellow     Appearance Urine Clear     Glucose Urine Negative NEG^Negative mg/dL    Bilirubin Urine Negative NEG^Negative    Ketones Urine 80 (A) NEG^Negative mg/dL    Specific Gravity Urine 1.026 1.003 - 1.035    Blood Urine Small (A) NEG^Negative    pH Urine 5.0 5.0 - 7.0 pH    Protein Albumin Urine 30 (A) NEG^Negative mg/dL    Urobilinogen mg/dL 0.0 0.0 - 2.0 mg/dL    Nitrite Urine Negative NEG^Negative    Leukocyte Esterase Urine Negative NEG^Negative    Source Unspecified Urine     RBC Urine <1 0 - 2 /HPF    WBC Urine 3 0 - 5 /HPF    Bacteria Urine Few (A) NEG^Negative /HPF    Mucous Urine Present (A) NEG^Negative /LPF    Hyaline Casts 1 0 - 2 /LPF   Urine Drugs of Abuse Screen Panel 13   Result Value Ref Range    Cannabinoids (76-[id number]-9-THC) Detected, Abnormal Result (A) NDET^Not Detected ng/mL    Phencyclidine (Phencyclidine) Not Detected NDET^Not Detected ng/mL    Cocaine (Benzoylecgonine) Not Detected NDET^Not Detected ng/mL    Methamphetamine  (d-Methamphetamine) Detected, Abnormal Result (A) NDET^Not Detected ng/mL    Opiates (Morphine) Not Detected NDET^Not Detected ng/mL    Amphetamine (d-Amphetamine) Detected, Abnormal Result (A) NDET^Not Detected ng/mL    Benzodiazepines (Nordiazepam) Detected, Abnormal Result (A) NDET^Not Detected ng/mL    Tricyclic Antidepressants (Desipramine) Not Detected NDET^Not Detected ng/mL    Methadone (Methadone) Not Detected NDET^Not Detected ng/mL    Barbiturates (Butalbital) Not Detected NDET^Not Detected ng/mL    Oxycodone (Oxycodone) Not Detected NDET^Not Detected ng/mL    Propoxyphene (Norpropoxyphene) Not Detected NDET^Not Detected ng/mL    Buprenorphine (Buprenorphine) Not Detected NDET^Not Detected ng/mL   CK total   Result Value Ref Range    CK Total 1,062 (HH) 30 - 300 U/L       Medications   LORazepam (ATIVAN) injection 1 mg (1 mg Intravenous Given 5/9/21 1312)   0.9% sodium chloride BOLUS (0 mLs Intravenous Stopped 5/9/21 1452)     Followed by   0.9% sodium chloride BOLUS (0 mLs Intravenous Stopped 5/9/21 1544)     Followed by   sodium chloride 0.9% infusion (has no administration in time range)   sodium chloride 0.9% infusion (1,000 mLs Intravenous New Bag 5/9/21 1618)     This is a 50-year-old male who presents with suicidal ideation with a plan and recent use of bath salts and alcohol use.  Patient [place] the [place] thinking about hanging himself with a please brought him to family who then brought him to the emergency department for evaluation.  Upon arrival vitals were stable.  Patient feels somewhat anxious and is wondering if he was may be withdrawing from alcohol but otherwise no complaints.  Denies any muscle pain or joint pain.  Denies any chest pain or shortness of breath.  Denies any nausea or vomiting.  Labs are obtained and patient's CK level was slightly elevated.  I drew this because of his recent use of bath salts and want to make sure he was not in full-blown rhabdomyolysis.  With  patient only have readings in the 1000s, this is not considered rhabdomyolysis as you need readings in the tens of thousands for this.  I think this slight elevation could be some withdrawal symptoms and some dehydration.  Patient was given 2 L of fluids and patient has ate and drink here and has had no vomiting.  Repeat labs shows that this is starting to trend down.  At this point we will check another one here in 3 hours and if it is still trending down, and patient can still continue to eat and drink normally, patient would be safe for inpatient mental health evaluation.  Patient also had a slightly low glucose level which goes along with his decreased p.o. intake.  Patient has eaten here without any problems.  Patient's bilirubin level was slightly elevated and liver tests were slightly up also, this goes along with his history of alcohol abuse and use, there does not appear to be any significant abnormalities with this.  Patient was seen by the DEC and they feel that the patient is not safe to go home as he is actively suicidal with the plan and would recommend inpatient evaluation which I agree with.  Once the repeat CK level comes back and is trending down, patient can be transferred at that time.    Assessments & Plan (with Medical Decision Making)  Suicidal ideation, drug abuse     I have reviewed the nursing notes.    I have reviewed the findings, diagnosis, plan and need for follow up with the patient.              5/9/2021   [facility] EMERGENCY DEPT     Walt Herndon MD  05/09/21 8296

## 2021-05-09 NOTE — TELEPHONE ENCOUNTER
R: Patient cleared and ready for behavioral bed placement: Yes     Pt has a hx of swallowing sharp objects. - Maricel paged and called back at 2:55pm.  Pt to go to unit 30/Vishnu.    Pt placed in queue and unit charge called.  Charge in report.  Left [facility] to call intake.  [facility] ED Updated.

## 2021-05-09 NOTE — ED TRIAGE NOTES
"Patient states he was standing [place] \"[place] river\" and had thoughts of jumping in and drowning himself. \"otherwise I will hang myself\". He was discharged from [facility] Friday. After leaving detox on Friday he drank Vodka and snorted bath salts. Yesterday he drank Listerine.   "

## 2021-05-09 NOTE — ED NOTES
DEC assessment complete and nephew returned to room 4 with patient's backpack. Patient had reported earlier that his bag in the car had knives in scissors in it. Nephew reports that he removed any weapons. Security called to check bag. Patient stood bedside to void into urinal. UA sent to lab. Water given. Waiting for bed placement.

## 2021-05-09 NOTE — ED NOTES
Patient is sitting up in bed and eating lunch. He reports tremors are gone, but slight tremor noted when hands extended. Waiting for placement.

## 2021-05-09 NOTE — TELEPHONE ENCOUNTER
S: Linden FAULKNER called at 1:22pm with 50y/M in [facility] ED seeking Detox and MH.    B:  Pt presents reporting he has drank daily, but 2 days when he was in detox and relapsed 1 month ago.  Pt has a hx of DT's, but no seizures.  Pt also reports injesting Bath Salt and Meth yesterday.   Pt is endorsing SI and went to [employer] to jump into the river.  Nephew called police, police brought pt back to nephews home and nephew brought pt to the ED.  Pt has a hx of Substance abuse and mood disorder.   Pt has had 15 CD treatments; hospitalized 6 times - last year at [facility];  Has had multiple suicide attempts with most recent in 2017; and 2 MICD Commitments - 1 in 2007 and another in 2017.    Pt reports he has been off all his meds this last month while on a drinking binder.  Pt has been cooperative in the ED.     A:  Vol    R: Patient cleared and ready for behavioral bed placement: Yes   ER  has medically cleared pt for IP MICD.    Provider paged @ 1:31pm to present for LINDA/Bret for MICD  Provider called back at 2:18pm and accepted  Pt placed in unit queue at 2:25pm and unit called with disposition.  ED updated with placement and accepting

## 2021-05-10 LAB
CHOLEST SERPL-MCNC: 176 MG/DL
CK SERPL-CCNC: 665 U/L (ref 30–300)
DEPRECATED CALCIDIOL+CALCIFEROL SERPL-MC: 25 UG/L (ref 20–75)
HDLC SERPL-MCNC: 59 MG/DL
LDLC SERPL CALC-MCNC: 98 MG/DL
NONHDLC SERPL-MCNC: 117 MG/DL
TRIGL SERPL-MCNC: 97 MG/DL
TSH SERPL DL<=0.005 MIU/L-ACNC: 3.19 MU/L (ref 0.4–4)

## 2021-05-10 PROCEDURE — 250N000013 HC RX MED GY IP 250 OP 250 PS 637: Performed by: PSYCHIATRY & NEUROLOGY

## 2021-05-10 PROCEDURE — 84443 ASSAY THYROID STIM HORMONE: CPT | Performed by: PSYCHIATRY & NEUROLOGY

## 2021-05-10 PROCEDURE — 82306 VITAMIN D 25 HYDROXY: CPT | Performed by: PSYCHIATRY & NEUROLOGY

## 2021-05-10 PROCEDURE — 82550 ASSAY OF CK (CPK): CPT | Performed by: PSYCHIATRY & NEUROLOGY

## 2021-05-10 PROCEDURE — 36415 COLL VENOUS BLD VENIPUNCTURE: CPT | Performed by: PSYCHIATRY & NEUROLOGY

## 2021-05-10 PROCEDURE — 80061 LIPID PANEL: CPT | Performed by: PSYCHIATRY & NEUROLOGY

## 2021-05-10 PROCEDURE — 99223 1ST HOSP IP/OBS HIGH 75: CPT | Mod: AI | Performed by: PSYCHIATRY & NEUROLOGY

## 2021-05-10 PROCEDURE — 124N000002 HC R&B MH UMMC

## 2021-05-10 RX ORDER — FAMOTIDINE 20 MG/1
20 TABLET, FILM COATED ORAL DAILY PRN
Status: ON HOLD | COMMUNITY
End: 2021-05-13

## 2021-05-10 RX ORDER — FAMOTIDINE 20 MG/1
20 TABLET, FILM COATED ORAL DAILY PRN
Status: DISCONTINUED | OUTPATIENT
Start: 2021-05-10 | End: 2021-05-14 | Stop reason: HOSPADM

## 2021-05-10 RX ORDER — VITAMIN B COMPLEX
25 TABLET ORAL DAILY
Status: DISCONTINUED | OUTPATIENT
Start: 2021-05-10 | End: 2021-05-14 | Stop reason: HOSPADM

## 2021-05-10 RX ORDER — VITAMIN B COMPLEX
1 TABLET ORAL DAILY
Status: ON HOLD | COMMUNITY
End: 2021-05-13

## 2021-05-10 RX ORDER — LISINOPRIL 20 MG/1
20 TABLET ORAL DAILY
Status: DISCONTINUED | OUTPATIENT
Start: 2021-05-10 | End: 2021-05-14 | Stop reason: HOSPADM

## 2021-05-10 RX ORDER — LISINOPRIL 40 MG/1
20 TABLET ORAL DAILY
Status: ON HOLD | COMMUNITY
End: 2021-05-13

## 2021-05-10 RX ORDER — FOLIC ACID 1 MG/1
1 TABLET ORAL DAILY
Status: ON HOLD | COMMUNITY
End: 2021-05-13

## 2021-05-10 RX ADMIN — THIAMINE HCL TAB 100 MG 100 MG: 100 TAB at 09:16

## 2021-05-10 RX ADMIN — NALTREXONE HYDROCHLORIDE 50 MG: 50 TABLET, FILM COATED ORAL at 15:49

## 2021-05-10 RX ADMIN — MULTIPLE VITAMINS W/ MINERALS TAB 1 TABLET: TAB at 09:16

## 2021-05-10 RX ADMIN — LISINOPRIL 20 MG: 20 TABLET ORAL at 20:30

## 2021-05-10 RX ADMIN — NICOTINE POLACRILEX 8 MG: 4 GUM, CHEWING BUCCAL at 11:15

## 2021-05-10 RX ADMIN — NICOTINE POLACRILEX 8 MG: 4 GUM, CHEWING BUCCAL at 18:16

## 2021-05-10 RX ADMIN — Medication 1 MG: at 09:35

## 2021-05-10 RX ADMIN — NICOTINE POLACRILEX 4 MG: 4 GUM, CHEWING BUCCAL at 09:35

## 2021-05-10 RX ADMIN — NICOTINE POLACRILEX 8 MG: 4 GUM, CHEWING BUCCAL at 15:50

## 2021-05-10 RX ADMIN — FOLIC ACID 1 MG: 1 TABLET ORAL at 09:16

## 2021-05-10 RX ADMIN — Medication 25 MCG: at 15:48

## 2021-05-10 ASSESSMENT — ACTIVITIES OF DAILY LIVING (ADL)
DRESS: INDEPENDENT
ORAL_HYGIENE: INDEPENDENT
HYGIENE/GROOMING: INDEPENDENT
LAUNDRY: UNABLE TO COMPLETE

## 2021-05-10 NOTE — PHARMACY-ADMISSION MEDICATION HISTORY
Admission Medication History Completed by Pharmacy    See [facility] Admission Navigator for allergy information, preferred outpatient pharmacy, prior to admission medications and immunization status.     Medication History Sources:     Surescripts (fill history), CareEverywhere, and patient interview (via telephone)    Changes made to PTA medication list (reason):    Added: per fill history, confirmed with patient report  o Famotidine PRN  o Folic acid  o Lisinopril  o Vitamin D3    Deleted: None    Changed: per fill history, confirmed with patient report  o Naltrexone 25 mg tablet --> 50 mg tablet daily    Additional Information:    None    Prior to Admission medications    Medication Sig Last Dose Taking? Auth Provider   ARIPiprazole (ABILIFY) 2 MG tablet Take 1 mg by mouth daily Past Month at Unknown time Yes Reported, Patient   famotidine (PEPCID) 20 MG tablet Take 20 mg by mouth daily as needed (heartburn) Past Month at Unknown time Yes Unknown, Entered By History   folic acid (FOLVITE) 1 MG tablet Take 1 mg by mouth daily Past Month at Unknown time Yes Unknown, Entered By History   lisinopril (ZESTRIL) 40 MG tablet Take 20 mg by mouth daily Past Month at Unknown time Yes Unknown, Entered By History   naltrexone (DEPADE;REVIA) 50 MG tablet Take 50 mg by mouth daily  Past Month at Unknown time Yes Tigre Frederick NP   nicotine polacrilex (NICORETTE) 2 MG gum Place 1-2 each (2-4 mg) inside cheek every hour as needed for other (nicotine withdrawal symptoms) Past Month at Unknown time Yes Tigre Frederick NP   Vitamin D3 (CHOLECALCIFEROL) 25 mcg (1000 units) tablet Take 1 tablet by mouth daily Past Month at Unknown time Yes Unknown, Entered By History       Date completed: 05/10/21    Medication history completed by:    Nicollette McMann, PharmD  [facility] Building: [place] *[id number]

## 2021-05-10 NOTE — PLAN OF CARE
"Patient states that he had thoughts of killing himself with a gun. Patient agrees that he had an argument with family member and then argued with a friend, then had suicidal thoughts. Patient states that he has no access to gun.\"The state will never give me enough money to have a gun. Patient states that he occasionally drinks alcohol, but would smoke pot every day to help him sleep if he had the money.\" States that he either sleeps a lot or has poor sleep. States that he lives in his mom's basement right now.States that he thinks that going to an ITRS might be the best plan for him. Has been at [facility] in the past.Denies hearing voices. His answer to question if he is suicidal now,states, \"I don't even know what that means.\" Has been hospitalized at [facility]. Has had sober living and DUI in the past.Patient is cooperative, rambles at times,accepting direction.    "

## 2021-05-10 NOTE — PLAN OF CARE
Work Completed:  Chart review  Team meeting  Interview with pt, completed IPSA, PPC, pt signed JUSTIN for his sister Abby Michaels (phone: [phone]).   Pt is agreeable to complete CD assessment and expressed interest in attending residential tx.  Writer returned call to CD assesser Alcon Wilks (phone: [phone]) who will plan to call pt in the morning.    Discharge plan or goal: Residential MICD treatment with appropriate supports in place.                Barriers to discharge: Symptom stabilization, medication management.

## 2021-05-10 NOTE — PROGRESS NOTES
05/09/21 6546   Patient Belongings   Did you bring any home meds/supplements to the hospital?  No   Patient Belongings locker;sent to security per site process   Patient Belongings Put in Hospital Secure Location (Security or Locker, etc.) other (see comments)   Belongings Search Yes   Clothing Search Yes   Second Staff Tommy PIÑA     Locker #10:  Black leather wallet with chain ([place] ID), smartphone (cracked screen and back missing)    Hospital bag: black Nike shoes, red socks, blue jeans, black leather studded belt, underwear-2, grey tshirt, green flannel, black sweater, sunglasses, yellow lighter    Green bag: grey undershirt-5, blue jeans, underwear-5, grey tshirt-2, socks-3 pair, black tshirt-2, black undershirt, light blue collared shirt    Security Envelope#[id number]:  [id number])    ..A               Admission:  I am responsible for any personal items that are not sent to the safe or pharmacy.  [facility] is not responsible for loss, theft or damage of any property in my possession.    Signature:  _________________________________ Date: _______  Time: _____                                              Staff Signature:  ____________________________ Date: ________  Time: _____      2nd Staff person, if patient is unable/unwilling to sign:    Signature: ________________________________ Date: ________  Time: _____     Discharge:  [facility] has returned all of my personal belongings:    Signature: _________________________________ Date: ________  Time: _____                                          Staff Signature:  ____________________________ Date: ________  Time: _____

## 2021-05-10 NOTE — PLAN OF CARE
Patients MSSA scores were 5,3 today. He states that he has no withdrawal symptoms.Mood is calm. He is cooperative. Good appetite. Attended one group. Denies voices.

## 2021-05-10 NOTE — PLAN OF CARE
"The patient and/or their representative will achieve their patient-specific goals related to the plan of care.    The patient-specific goals include:       \"Reasons you are in the hospital;\" The patient identifies the following reasons for current hospitalization:   I've been drinking for the last 10 days, sober for 4 months before  Thinking I could do what I want, chemical dependency affecting housing    \"Goals for Discharge\" The patient identifies the following goals for discharge:  CD treatment or program, build up confidence or foundation  Stay sober  Sustain myself      Problem: Behavioral Health Plan of Care  Goal: Patient-Specific Goal (Individualization)  Flowsheets (Taken 5/10/2021 4654)  Patient Vulnerabilities:   history of unsuccessful treatment   housing insecurity   legal concerns   occupational insecurity   substance abuse/addiction  Patient Personal Strengths:   expressive of needs   family/social support   motivated for treatment   resilient   spiritual/Mormon support  Note:        "
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
## 2021-05-10 NOTE — CONSULTS
"This counselor talked with this patient's [address] , Dee, on 5/10/2021 at 1:38 pm.  Dee stated she had not yet met with the patient so she was uncertain if he were stable enough to paticipate in a substance use disorder assessment yet, since it had been noted in the patient's EMR yesterday on 5/9/2021, he was \"tangential during admission interview\".  Dee stated she planned to meet with the patient this afternoon and after meeting with the patient she would call this counselor back at [phone] to report if the patient was stable enough to participate in a substance use disorder assessment and if he was interested in entering a residential substance use disorder treatment program upon his discharge from the hospital.  This counselor will await a return call from this patient's [address] , Dee.  "

## 2021-05-10 NOTE — PLAN OF CARE
BEHAVIORAL TEAM DISCUSSION    Participants: Joyce Mares DO, Yari Gamez RN, [facility]  Progress: Minimal    Anticipated length of stay: 1-2 weeks  Continued Stay Criteria/Rationale: Pt requires symptom stabilization and medication management. Recent SI with plan.   Medical/Physical: No acute issues  Precautions:   Behavioral Orders   Procedures     Code 1 - Restrict to Unit     Elopement precautions     Routine Programming     As clinically indicated     Self Injury Precaution     Single Room     Status 15     Every 15 minutes.     Suicide precautions     Patients on Suicide Precautions should have a Combination Diet ordered that includes a Diet selection(s) AND a Behavioral Tray selection for Safe Tray - with utensils, or Safe Tray - NO utensils       Withdrawal precautions     Plan: Residential MICD treatment with appropriate supports in place.  Rationale for change in precautions or plan: Initial plan.

## 2021-05-10 NOTE — PLAN OF CARE
"Initial Psychosocial Assessment    I have reviewed the chart, met with the patient, and developed Care Plan.  Information for assessment was obtained from:     Pt chart and 1:1 interview    Presenting Problem:  Pt is a 50 year old male who presented to [facility] ED with SI with plan and alcohol and bath salt use. He has an extensive hx of suicide attempts including hangings and overdoses. Pt has not been taking his prescription medications for the last 2 weeks. He was sober for some time but started using again. His SI has increased and he does not feel safe being alone.    History of Mental Health and Chemical Dependency:  He has a hx of depression, substance-induced psychosis and polysubstance abuse. He has recent use of substances including bath salts, methamphetamine, cannabis, and alcohol.   Pt started using cannabis since age 15, currently using daily. Pt first used methamphetamine at age 20, used frequently up to daily at times. Pt had past use of opiates/heroin with dx of Hepatitis C in 2014.     Family Description (Constellation, Family Psychiatric History):  Hx of completed suicides in family.  Great aunt has hx of schizophrenia.    Significant Life Events (Illness, Abuse, Trauma, Death):  Childhood trauma per pt's chart.  Pt reports no recent issues.    Living Situation:  Pt is homeless, he was staying with his sister in the [facility]. He has attended residential treatment programs across [place].     Educational Background:  Pt reports he completed 1 year of college.    Occupational History:  Pt is pursuing work with [employer], 11 week program.    Financial Status:  Pt reports \"not many finances\".    Legal Issues:  Pt has a felony, he is not on probation.     Ethnic/Cultural Issues:  Pt reports he is Native.    Spiritual Orientation:  Pt says he goes to [employer] and follows Native spiritual practice.     Service History:  None    Social Functioning (organization, interests):  Pt " seems to be decent historian, sharing information about previous experiences with treatment and able to identify challenges in the community with regard to staying sober. Pt expressed interest in pursuing CD treatment and having stable employment. His sister is main social support.     Current Treatment Providers are:  No current outpatient providers    Social Service Assessment/Plan:  Patient will have psychiatric assessment and medication management by the psychiatrist. Medications will be reviewed and adjusted per DO as indicated. The treatment team will continue to assess and stabilize the patient's mental health symptoms with the use of medications and therapeutic programming. Hospital staff will provide a safe environment and a therapeutic milieu. Staff will continue to assess patient as needed. Patient will participate in unit groups and activities. Patient will receive individual and group support on the unit.      CTC will do individual inpatient treatment planning and after care planning. CTC will discuss options for increasing community supports with the patient. CTC will coordinate with outpatient providers and will place referrals to ensure appropriate follow up care is in place.
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
## 2021-05-10 NOTE — PLAN OF CARE
Patient appeared to be asleep for 7 hours during safety checks this shift. MSSA score was a 2. No complaints or concerns voiced by patient or noted by staff. Will continue to monitor and update if there are changes.

## 2021-05-11 PROCEDURE — G0177 OPPS/PHP; TRAIN & EDUC SERV: HCPCS

## 2021-05-11 PROCEDURE — 250N000013 HC RX MED GY IP 250 OP 250 PS 637: Performed by: PSYCHIATRY & NEUROLOGY

## 2021-05-11 PROCEDURE — 99232 SBSQ HOSP IP/OBS MODERATE 35: CPT | Performed by: PSYCHIATRY & NEUROLOGY

## 2021-05-11 PROCEDURE — H0001 ALCOHOL AND/OR DRUG ASSESS: HCPCS

## 2021-05-11 PROCEDURE — 124N000002 HC R&B MH UMMC

## 2021-05-11 RX ADMIN — NALTREXONE HYDROCHLORIDE 50 MG: 50 TABLET, FILM COATED ORAL at 08:35

## 2021-05-11 RX ADMIN — FOLIC ACID 1 MG: 1 TABLET ORAL at 08:35

## 2021-05-11 RX ADMIN — Medication 1 MG: at 08:35

## 2021-05-11 RX ADMIN — NICOTINE POLACRILEX 8 MG: 4 GUM, CHEWING BUCCAL at 20:40

## 2021-05-11 RX ADMIN — Medication 25 MCG: at 08:35

## 2021-05-11 RX ADMIN — LISINOPRIL 20 MG: 20 TABLET ORAL at 08:34

## 2021-05-11 RX ADMIN — NICOTINE POLACRILEX 4 MG: 4 GUM, CHEWING BUCCAL at 14:36

## 2021-05-11 RX ADMIN — NICOTINE POLACRILEX 8 MG: 4 GUM, CHEWING BUCCAL at 08:35

## 2021-05-11 RX ADMIN — MULTIPLE VITAMINS W/ MINERALS TAB 1 TABLET: TAB at 08:35

## 2021-05-11 RX ADMIN — TRAZODONE HYDROCHLORIDE 50 MG: 50 TABLET ORAL at 20:40

## 2021-05-11 RX ADMIN — NICOTINE POLACRILEX 8 MG: 4 GUM, CHEWING BUCCAL at 19:03

## 2021-05-11 RX ADMIN — THIAMINE HCL TAB 100 MG 100 MG: 100 TAB at 08:35

## 2021-05-11 ASSESSMENT — ACTIVITIES OF DAILY LIVING (ADL)
HYGIENE/GROOMING: INDEPENDENT
DRESS: INDEPENDENT
ORAL_HYGIENE: INDEPENDENT

## 2021-05-11 ASSESSMENT — MIFFLIN-ST. JEOR: SCORE: 1748.07

## 2021-05-11 NOTE — CONSULTS
5/11/2021    CD consult completed.   Pt wants referrals to the following below. Pt discussed maybe doing his work program. If he cannot get into the work program he will go to treatment. We discussed how he has been to multiple treatments, that maybe having structure such as a job might be helpful for him to stay sober. We decided on sending out referrals as he then has a back up plan. I said if he discharged he can have the numbers to his referrals to follow up and my number if he may need further referrals.     Recommendations:   1. Abstain from all non-prescribed mood-altering substances  2. Take all medications as prescribed  3. Enter and complete a MICD residential or [facility] with lodging program  4. Follow all recommendations upon discharge from treatment. Recommendations may include, but are not limited to: extended treatment, outpatient treatment and/or sober housing.  5. Follow all recommendations of your medical providers    Referrals:  Tadeo Cuellar  [address] [address]  Phone Number: [phone]  Fax Number: [phone]  Admissions Phone Number: [phone]    [place] [address]   Phone: [phone]   Fax: [phone]     [facility]  Phone: [phone]  Fax: [phone]  [facility] [address] [place], [place] [id number] [place] [address]  Phone: [phone]  Fax: [phone]   [address]  [address] [place] [id number]    BARBARA consult completed by: LIBORIO Waldron.  Phone Number: [phone]  E-mail Address: [email or web address]  [address] [place] [id number]
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
## 2021-05-11 NOTE — PLAN OF CARE
Work Completed:  Chart review  Team meeting  Writer spoke with pt who says he's hoping to pursue [employer] work program starting on 5/23, he stated they would provide housing and food stipend and pt feels confident the structure of that program would be supportive for his sobriety and overall MH. Pt hopes to discharge 5/13 to meet with person from this program on 5/14, he plans to use debit card to pay for motel until the [employer] program begins. Pt says he spoke with his sister who encourages him to pursue treatment and pt said his sister won't let him live with her between discharge from hospital and acceptance to [employer], she would be more open to him staying with her while awaiting CD tx bed. Pt is agreeable to outpatient therapist and attending AA group. He says he's noticing improvement in how he's feeling since coming to hospital.   Pt completed CD assessment.     Discharge plan or goal: CD treatment with appropriate services in place.                Barriers to discharge: Symptom stabilization, medication management.

## 2021-05-11 NOTE — PLAN OF CARE
Janusz had a good shift. Calm, pleasant cooperative. He feels better, even having been here only a few days being back on his medications.     He is hopeful to get his CD assessment done today and to get to treatment as soon as he can. He stated he would actually prefer a locked treatment center because he knows himself, and knows that he is impulsive. He stated either that or he would go down to see his mom in [place]. He was also working on some other potential placement options, such as one that involves doing some labor like concrete work- but whatever happens he stated that he is going to keep a positive attitude.     He currently denies SI.

## 2021-05-11 NOTE — PLAN OF CARE
"  Problem: Behavior Regulation Impairment (Excessive Substance Use)  Goal: Improved Behavioral Control (Excessive Substance Use)  Outcome: Improving     Problem: Suicidal Behavior  Goal: Suicidal Behavior is Absent or Managed  Outcome: Improving    Janusz has been present in the television lounge for half of the shift, the other half he has been resting in his bed. He denied any SI/SIB/AH/VH. He reported \"Want to wash my clothes before going to treatment.\" The wash machine on this unit is currently not working. Janusz has been pleasant, calm and cooperative. He did not participate in evening programing. Nursing to continue to support current plan of care.     "

## 2021-05-11 NOTE — PROGRESS NOTES
[facility], [place]   Psychiatric Progress Note  Hospital Day: 2        Interim History:   The patient's care was discussed with the treatment team during the daily team meeting and/or staff's chart notes were reviewed.  Staff report patient has been visible in the milieu at times, taking medication as prescribed, denying AVH, SI, HI; MSSA 2, no acute events overnight.     Upon interview, the patient reports his mood continues to improve, denies any further SI, denies having self harm thoughts. Denies AVH. Denies HI. Tolerating medications. He was able to connect with a reservation liaison for the [facility] program which would start May 23rd and is interested in pursuing this as he believes it would give him employment, structure and purpose and that would help with his mental health and help him to remain sober. Discussed concerns that this plan would not be supportive enough around his sobriety, especially since the lodging [place] during program [place]. He is able to identify several sober communities in the area and would plan to go to AA meetings and work with sponsors. He states his sister wants him to go to CD treatment but does not know if that would change the direction of his life for the better as he would have nothing to do after CD treatment. He is open to completing assessment and considering options. He would plan to discharge Thursday to get his debit card and then have meeting with the liaison on Friday for in person interview for program. He would plan to stay in hotel until program starts, discussed possibly looking into crisis bed to help have a sober and supportive environment to bridge him until this program would start or if not able to start program could work on follow up with CD tx referrals while at crisis facility. He is open to this recommendation. Will discuss further with [facility] to see if this is possible. No additional concerns.          Medications:        ARIPiprazole  1 mg Oral Daily     folic acid  1 mg Oral Daily     lisinopril  20 mg Oral Daily     multivitamin w/minerals  1 tablet Oral Daily     naltrexone  50 mg Oral Daily     thiamine  100 mg Oral Daily     Vitamin D3  25 mcg Oral Daily          Allergies:     Allergies   Allergen Reactions     Bee Venom Anaphylaxis     Pt reported- has Epi Pen  Pt reported- has Epi Pen  Pt reported- has Epi Pen  Pt reported- has Epi Pen       Nuts Swelling     Tongue, throat swelling     Pistachio Nut Extract Skin Test Other (See Comments)     Tongue Swelling            Labs:     Recent Results (from the past 48 hour(s))   CBC with platelets differential    Collection Time: 05/09/21 12:05 PM   Result Value Ref Range    WBC 9.6 4.0 - 11.0 10e9/L    RBC Count 4.34 (L) 4.4 - 5.9 10e12/L    Hemoglobin 13.3 13.3 - 17.7 g/dL    Hematocrit 38.8 (L) 40.0 - 53.0 %    MCV 89 78 - 100 fl    MCH 30.6 26.5 - 33.0 pg    MCHC 34.3 31.5 - 36.5 g/dL    RDW 12.8 10.0 - 15.0 %    Platelet Count 215 150 - 450 10e9/L    Diff Method Automated Method     % Neutrophils 82.9 %    % Lymphocytes 10.9 %    % Monocytes 5.0 %    % Eosinophils 0.5 %    % Basophils 0.5 %    % Immature Granulocytes 0.2 %    Nucleated RBCs 0 0 /100    Absolute Neutrophil 8.0 1.6 - 8.3 10e9/L    Absolute Lymphocytes 1.1 0.8 - 5.3 10e9/L    Absolute Monocytes 0.5 0.0 - 1.3 10e9/L    Absolute Eosinophils 0.1 0.0 - 0.7 10e9/L    Absolute Basophils 0.1 0.0 - 0.2 10e9/L    Abs Immature Granulocytes 0.0 0 - 0.4 10e9/L    Absolute Nucleated RBC 0.0    Comprehensive metabolic panel    Collection Time: 05/09/21 12:05 PM   Result Value Ref Range    Sodium 139 133 - 144 mmol/L    Potassium 3.8 3.4 - 5.3 mmol/L    Chloride 103 94 - 109 mmol/L    Carbon Dioxide 23 20 - 32 mmol/L    Anion Gap 13 3 - 14 mmol/L    Glucose 66 (L) 70 - 99 mg/dL    Urea Nitrogen 23 7 - 30 mg/dL    Creatinine 0.81 0.66 - 1.25 mg/dL    GFR Estimate >90 >60 mL/min/[1.73_m2]    GFR Estimate If Black >90 >60  mL/min/[1.73_m2]    Calcium 9.1 8.5 - 10.1 mg/dL    Bilirubin Total 1.8 (H) 0.2 - 1.3 mg/dL    Albumin 4.0 3.4 - 5.0 g/dL    Protein Total 8.3 6.8 - 8.8 g/dL    Alkaline Phosphatase 107 40 - 150 U/L    ALT 36 0 - 70 U/L    AST 73 (H) 0 - 45 U/L   INR    Collection Time: 05/09/21 12:05 PM   Result Value Ref Range    INR 1.04 0.86 - 1.14   PTT    Collection Time: 05/09/21 12:05 PM   Result Value Ref Range    PTT 27 22 - 37 sec   CK total    Collection Time: 05/09/21 12:05 PM   Result Value Ref Range    CK Total 1,383 (HH) 30 - 300 U/L   Alcohol ethyl    Collection Time: 05/09/21 12:05 PM   Result Value Ref Range    Ethanol g/dL <0.01 <0.01 g/dL   Magnesium    Collection Time: 05/09/21 12:05 PM   Result Value Ref Range    Magnesium 2.1 1.6 - 2.3 mg/dL   Asymptomatic SARS-CoV-2 COVID-19 Virus (Coronavirus) by PCR    Collection Time: 05/09/21 12:05 PM    Specimen: Nasopharyngeal   Result Value Ref Range    SARS-CoV-2 Virus Specimen Source Nasopharyngeal     SARS-CoV-2 PCR Result NEGATIVE     SARS-CoV-2 PCR Comment (Note)    UA reflex to Microscopic    Collection Time: 05/09/21  1:20 PM   Result Value Ref Range    Color Urine Yellow     Appearance Urine Clear     Glucose Urine Negative NEG^Negative mg/dL    Bilirubin Urine Negative NEG^Negative    Ketones Urine 80 (A) NEG^Negative mg/dL    Specific Gravity Urine 1.026 1.003 - 1.035    Blood Urine Small (A) NEG^Negative    pH Urine 5.0 5.0 - 7.0 pH    Protein Albumin Urine 30 (A) NEG^Negative mg/dL    Urobilinogen mg/dL 0.0 0.0 - 2.0 mg/dL    Nitrite Urine Negative NEG^Negative    Leukocyte Esterase Urine Negative NEG^Negative    Source Unspecified Urine     RBC Urine <1 0 - 2 /HPF    WBC Urine 3 0 - 5 /HPF    Bacteria Urine Few (A) NEG^Negative /HPF    Mucous Urine Present (A) NEG^Negative /LPF    Hyaline Casts 1 0 - 2 /LPF   Urine Drugs of Abuse Screen Panel 13    Collection Time: 05/09/21  1:20 PM   Result Value Ref Range    Cannabinoids (77-ekm-5-carboxy-9-THC)  "Detected, Abnormal Result (A) NDET^Not Detected ng/mL    Phencyclidine (Phencyclidine) Not Detected NDET^Not Detected ng/mL    Cocaine (Benzoylecgonine) Not Detected NDET^Not Detected ng/mL    Methamphetamine (d-Methamphetamine) Detected, Abnormal Result (A) NDET^Not Detected ng/mL    Opiates (Morphine) Not Detected NDET^Not Detected ng/mL    Amphetamine (d-Amphetamine) Detected, Abnormal Result (A) NDET^Not Detected ng/mL    Benzodiazepines (Nordiazepam) Detected, Abnormal Result (A) NDET^Not Detected ng/mL    Tricyclic Antidepressants (Desipramine) Not Detected NDET^Not Detected ng/mL    Methadone (Methadone) Not Detected NDET^Not Detected ng/mL    Barbiturates (Butalbital) Not Detected NDET^Not Detected ng/mL    Oxycodone (Oxycodone) Not Detected NDET^Not Detected ng/mL    Propoxyphene (Norpropoxyphene) Not Detected NDET^Not Detected ng/mL    Buprenorphine (Buprenorphine) Not Detected NDET^Not Detected ng/mL   CK total    Collection Time: 05/09/21  3:30 PM   Result Value Ref Range    CK Total 1,062 (HH) 30 - 300 U/L   CK total    Collection Time: 05/09/21  6:01 PM   Result Value Ref Range    CK Total 1,054 (HH) 30 - 300 U/L   TSH with free T4 reflex and/or T3 as indicated    Collection Time: 05/10/21  7:40 AM   Result Value Ref Range    TSH 3.19 0.40 - 4.00 mU/L   Lipid panel    Collection Time: 05/10/21  7:40 AM   Result Value Ref Range    Cholesterol 176 <200 mg/dL    Triglycerides 97 <150 mg/dL    HDL Cholesterol 59 >39 mg/dL    LDL Cholesterol Calculated 98 <100 mg/dL    Non HDL Cholesterol 117 <130 mg/dL   Vitamin D    Collection Time: 05/10/21  7:40 AM   Result Value Ref Range    Vitamin D Deficiency screening 25 20 - 75 ug/L   CK total    Collection Time: 05/10/21  7:40 AM   Result Value Ref Range    CK Total 665 (H) 30 - 300 U/L          Psychiatric Examination:     /80 (BP Location: Left arm)   Pulse 57   Temp 97.6  F (36.4  C) (Oral)   Resp 16   Ht 1.803 m (5' 11\")   Wt 87.1 kg (192 lb)   " SpO2 98%   BMI 26.78 kg/m    Weight is 192 lbs 0 oz  Body mass index is 26.78 kg/m .    Orthostatic Vitals       Most Recent      Sitting Orthostatic /91 05/10 1234    Sitting Orthostatic Pulse (bpm) 59 05/10 1234    Standing Orthostatic /88 05/10 0750    Standing Orthostatic Pulse (bpm) 74 05/10 0750        Appearance: awake, alert, adequately groomed and dressed in hospital scrubs  Attitude:  cooperative  Eye Contact:  good  Mood:  better  Affect:  appropriate and in normal range and mood congruent  Speech:  clear, coherent and normal prosody  Language: fluent and intact in English  Psychomotor, Gait, Musculoskeletal:  no evidence of tardive dyskinesia, dystonia, or tics  Thought Process:  logical, linear and goal oriented  Associations:  no loose associations  Thought Content:  no evidence of suicidal ideation or homicidal ideation and no evidence of psychotic thought  Insight:  partial  Judgement:  fair  Oriented to:  time, person, and place  Attention Span and Concentration:  intact  Recent and Remote Memory:  intact  Fund of Knowledge:  appropriate    Clinical Global Impressions  First:  Considering your total clinical experience with this particular patient population, how severe are the patient's symptoms at this time?: 7 (05/10/21 1908)  Compared to the patient's condition at the START of treatment, this patient's condition is: 4 (05/10/21 1908)  Most recent:  Considering your total clinical experience with this particular patient population, how severe are the patient's symptoms at this time?: 7 (05/10/21 1908)  Compared to the patient's condition at the START of treatment, this patient's condition is: 4 (05/10/21 1908)           Precautions:     Behavioral Orders   Procedures     Code 1 - Restrict to Unit     Elopement precautions     Routine Programming     As clinically indicated     Self Injury Precaution     Single Room     Status 15     Every 15 minutes.     Suicide precautions      Patients on Suicide Precautions should have a Combination Diet ordered that includes a Diet selection(s) AND a Behavioral Tray selection for Safe Tray - with utensils, or Safe Tray - NO utensils       Withdrawal precautions          Diagnoses:   Suicidal ideation   Unspecified depression   Borderline Personality Disorder  Alcohol use disorder, severe  Alcohol withdrawal, severe  Methamphetamine use disorder, severe  Cannabis use disorder, severe  Polysubstance abuse including IVDU resulting in Hep C  History of SIB by swallowing sharp objects  HTN  Vit D defiency   Elevated AST, likely alcohol induced hepatitis         Assessment & Plan:   Assessment and hospital summary:  This patient is a 50 year old male with history of depression, substance induced psychosis and polysubstance abuse who presented to [facility] ED with SI in context of being off of medications for over a week and continuing to use substances including bath salts, methamphetamine, cannabis and alcohol. Pt was noted to be disorganized, tangential in the ED, this improved by time of admission interview on 30N. He was more organized, logical, denying further SI. Denies any urges to engage in SIB by swallowing objects. Displayed insight into the impact substance use has on his mood, he is agreeable to CD evaluation and [facility] He is agreeable to signing in as a voluntary patient. Reports stability in his mood over the past year with use of Abilify and would like to continue with this medication. He denies having any acute physical health concerns.      Inpatient psychiatric hospitalization is warranted at this time for safety, stabilization, and possible adjustment in medications.    Psychiatric treatment/inteventions:  Medications:   Medications:   -continue PTA aripiprazole 1mg daily for mood, SI and psychosis   -discontinue MSSA with diazepam as pt has not scored to require PRN diazepam for >24 hours  -continue  multivitamin, folic acid, thiamine for alcohol use  -continue PTA naltrexone 50mg daily for polysubstance use  -continue PTA Vit D3 25mcg daily      Laboratory/Imaging: lipid panel WNL, TSH WNL and Vit D 25; will recheck hepatic panel and CK 5/12     Patient will be treated in therapeutic milieu with appropriate individual and group therapies as described.     Medical treatment/interventions:  Medical concerns:   1) EtOH withdrawal with pt reporting hx of complicated withdrawal including DTs  -MSSA discontinued as above, pt does not appear to be in withdrawal   -withdrawal precautions     2) Hypertension   -resume PTA lisinopril 20mg daily   -monitor VS, consult IM if indicated     3) Vit D defiency  -continue PTA Vit D as above  -Vit D level     4) Elevated CK, likely 2/2 substance use  -CK trending down as above, will recheck 5/12  -encourage hydration   -continue to monitor and alert IM of any acute concerns      5) Elevated AST with hx of Hep C  -likely alcohol induced hepatitis  -will trend and check 5/12  -if continues to be elevated may consider holding naltrexone      This note was created by undersigned using a Dragon dictation system. All typing errors or contextual distortion are unintentional and software inherent.     Disposition Plan   Reason for ongoing admission: poses an imminent risk to self and requires detoxification from substance that poses a risk of bodily harm during withdrawal period  Discharge location: Chemical dependency treatment facility  Discharge Medications: not ordered  Follow-up Appointments: not scheduled  Legal Status: voluntary  Entered by: Joyce Mares on 5/11/2021 at 7:41 AM

## 2021-05-11 NOTE — PLAN OF CARE
Pt appears to have slept 7 hours this shift, MSSA score of 2, VSS.    Pt continues on suicide, SIB, elopement and withdrawal precautions.     No concerns noted or reported at this time, will continue to monitor and support plan of care.

## 2021-05-11 NOTE — PROGRESS NOTES
05/11/21 1200   General Information   Date Initially Attended OT 05/11/21   Clinical Impression   Affect Appropriate to situation;Flat   Orientation Oriented to person, place and time;Needs further assessment   Appearance and ADLs General cleanliness observed in most areas   Attention to Internal Stimuli No observed signs   Interaction Skills Initiates appropriately with staff;Initiates appropriately with peers   Ability to Communicate Needs Independent   Verbal Content Clear;Appropriate to topic   Ability to Maintain Boundaries Maintains appropriate physical boundaries;Maintains appropriate verbal boundaries   Participation Independently participates   Concentration Concentrates 50 minutes;Needs further assessment   Ability to Concentrate With structure   Follows and Comprehends Directions Needs further assessment   Memory Delayed and immediate recall intact;Needs further assessment   Organization Needs further assessment;Independently organizes all tasks   Decision Making Needs further assessment   Planning and Problem Solving Needs further assessment;Independently plans ahead   Ability to Apply and Learn Concepts Applies within group structure   Frustrations / Stress Tolerance Needs further assessment   Level of Insight Needs further assessment   Self Esteem Can identify positives;Needs further assessment   Social Supports Needs further assessment   General Observation/Plan   General Observations/Plan See Comments   Attended the AM OT group for 50 minutes with 6 pts. He participated in an activity focused on identifying helpful ideas for calming using the 5 senses, and practicing a grounding technique with this focus. He offered direct eye contact with each speaker throughout the session, he also offered occasional comments related to the topic or question being discussed. He elaborated on comments also on occasion. He stated the grounding technique was helpful for him to try. He also stated nature is calming  for him. Plan: Provide structure, support, and encouragement through attendance to groups. Provide opportunities  to increase self awareness regarding mental health issues and expand helpful coping strategies. Encourage active participation. Assess further/

## 2021-05-12 LAB
ALBUMIN SERPL-MCNC: 3.3 G/DL (ref 3.4–5)
ALP SERPL-CCNC: 84 U/L (ref 40–150)
ALT SERPL W P-5'-P-CCNC: 27 U/L (ref 0–70)
AST SERPL W P-5'-P-CCNC: 28 U/L (ref 0–45)
BILIRUB DIRECT SERPL-MCNC: <0.1 MG/DL (ref 0–0.2)
BILIRUB SERPL-MCNC: 0.3 MG/DL (ref 0.2–1.3)
CK SERPL-CCNC: 197 U/L (ref 30–300)
PROT SERPL-MCNC: 7.1 G/DL (ref 6.8–8.8)

## 2021-05-12 PROCEDURE — 80076 HEPATIC FUNCTION PANEL: CPT | Performed by: PSYCHIATRY & NEUROLOGY

## 2021-05-12 PROCEDURE — 250N000013 HC RX MED GY IP 250 OP 250 PS 637: Performed by: PSYCHIATRY & NEUROLOGY

## 2021-05-12 PROCEDURE — 124N000002 HC R&B MH UMMC

## 2021-05-12 PROCEDURE — 36415 COLL VENOUS BLD VENIPUNCTURE: CPT | Performed by: PSYCHIATRY & NEUROLOGY

## 2021-05-12 PROCEDURE — G0177 OPPS/PHP; TRAIN & EDUC SERV: HCPCS

## 2021-05-12 PROCEDURE — 82550 ASSAY OF CK (CPK): CPT | Performed by: PSYCHIATRY & NEUROLOGY

## 2021-05-12 RX ORDER — EPINEPHRINE 0.3 MG/.3ML
0.3 INJECTION SUBCUTANEOUS DAILY PRN
Status: DISCONTINUED | OUTPATIENT
Start: 2021-05-12 | End: 2021-05-14 | Stop reason: HOSPADM

## 2021-05-12 RX ADMIN — NICOTINE POLACRILEX 4 MG: 4 GUM, CHEWING BUCCAL at 14:29

## 2021-05-12 RX ADMIN — NALTREXONE HYDROCHLORIDE 50 MG: 50 TABLET, FILM COATED ORAL at 08:55

## 2021-05-12 RX ADMIN — NICOTINE POLACRILEX 8 MG: 4 GUM, CHEWING BUCCAL at 08:55

## 2021-05-12 RX ADMIN — NICOTINE POLACRILEX 8 MG: 4 GUM, CHEWING BUCCAL at 20:11

## 2021-05-12 RX ADMIN — TRAZODONE HYDROCHLORIDE 50 MG: 50 TABLET ORAL at 20:39

## 2021-05-12 RX ADMIN — Medication 25 MCG: at 08:55

## 2021-05-12 RX ADMIN — LISINOPRIL 20 MG: 20 TABLET ORAL at 08:55

## 2021-05-12 RX ADMIN — FOLIC ACID 1 MG: 1 TABLET ORAL at 08:55

## 2021-05-12 RX ADMIN — Medication 1 MG: at 08:55

## 2021-05-12 RX ADMIN — MULTIPLE VITAMINS W/ MINERALS TAB 1 TABLET: TAB at 08:55

## 2021-05-12 RX ADMIN — THIAMINE HCL TAB 100 MG 100 MG: 100 TAB at 08:55

## 2021-05-12 RX ADMIN — NICOTINE POLACRILEX 8 MG: 4 GUM, CHEWING BUCCAL at 17:05

## 2021-05-12 ASSESSMENT — ACTIVITIES OF DAILY LIVING (ADL)
ORAL_HYGIENE: INDEPENDENT
DRESS: STREET CLOTHES;INDEPENDENT
HYGIENE/GROOMING: HANDWASHING;SHOWER;INDEPENDENT
LAUNDRY: WITH SUPERVISION

## 2021-05-12 NOTE — PLAN OF CARE
Pt out in common areas most of the shift. Pt pleasant on approach.  Pt attended and participated in select groups. Pt denies any suicidal thoughts.  Pt is thinking about aftercare plans.  Pt considering going to CD tx or a work training program. Pt has full range of affect full.

## 2021-05-12 NOTE — PLAN OF CARE
Work Completed:  Chart review  CD  emailed ROIs for CD tx referrals, pt signed and writer added to paper chart.  Crisis home, CD referral information, therapy appointment added to AVS.    Discharge plan or goal: CD treatment with appropriate supports in place.                Barriers to discharge: Symptom stabilization, medication management.

## 2021-05-12 NOTE — PROGRESS NOTES
Behavioral Health  Note   Behavioral Health  Spirituality Group Note     Unit 30    Name: Janusz Escalera    YOB: 1970   MRN: [id number]    Age: 50 year old     Patient attended -led group, which included discussion of spirituality, coping with illness and building resilience.   Patient attended group for 1.0 hrs.   patient demonstrated an appreciation of topic's application for their personal circumstances.     [facility]  Staff    Page [phone]

## 2021-05-12 NOTE — PROGRESS NOTES
"Janusz  attended a Life Skills group this a.m. that involved sharing reflections according to question prompts. Talkative and pleasant. Told a long-winded story about an \"adventure\" he took in which he walked 68 miles to visit his son.      05/12/21 1500   Occupational Therapy   Type of Intervention structured groups   Response Initiates, socially acceptable   Hours 1       "

## 2021-05-12 NOTE — PLAN OF CARE
Problem: Behavioral Disturbance  Goal: Behavioral Disturbance  Description: Signs and symptoms of listed problems will be absent or manageable by discharge or transition of care.  5/11/2021 1932 by Yuliet Solorio RN  Outcome: Improving  Flowsheets (Taken 5/11/2021 1928)  Behavioral Disturbance Assessed: all  Behavioral Disturbance Present: none  Janusz has been present in the community areas of the unit this evening. Has been making frequent phone calls. He denies any SI/SIB/AH/VH. He has been pleasant , calm and cooperative. Nursing to continue to support current plan of care.

## 2021-05-12 NOTE — PLAN OF CARE
Pt appears to have slept 6.5 hours this shift.    Pt continues on suicide, SIB, elopement, withdrawal and single room precautions.     No concerns noted or reported at this time, will continue to monitor and support plan of care.

## 2021-05-13 PROCEDURE — 250N000013 HC RX MED GY IP 250 OP 250 PS 637: Performed by: PSYCHIATRY & NEUROLOGY

## 2021-05-13 PROCEDURE — H2032 ACTIVITY THERAPY, PER 15 MIN: HCPCS

## 2021-05-13 PROCEDURE — 124N000002 HC R&B MH UMMC

## 2021-05-13 PROCEDURE — G0177 OPPS/PHP; TRAIN & EDUC SERV: HCPCS

## 2021-05-13 PROCEDURE — 99232 SBSQ HOSP IP/OBS MODERATE 35: CPT | Performed by: PSYCHIATRY & NEUROLOGY

## 2021-05-13 RX ORDER — LISINOPRIL 20 MG/1
20 TABLET ORAL DAILY
Qty: 30 TABLET | Refills: 0 | Status: SHIPPED | OUTPATIENT
Start: 2021-05-13 | End: 2021-07-14

## 2021-05-13 RX ORDER — FOLIC ACID 1 MG/1
1 TABLET ORAL DAILY
Qty: 30 TABLET | Refills: 0 | Status: ON HOLD | OUTPATIENT
Start: 2021-05-13 | End: 2021-07-19

## 2021-05-13 RX ORDER — NALTREXONE HYDROCHLORIDE 50 MG/1
50 TABLET, FILM COATED ORAL DAILY
Qty: 30 TABLET | Refills: 0 | Status: ON HOLD | OUTPATIENT
Start: 2021-05-13 | End: 2021-07-19

## 2021-05-13 RX ORDER — VITAMIN B COMPLEX
1 TABLET ORAL DAILY
Qty: 30 TABLET | Refills: 0 | Status: ON HOLD | OUTPATIENT
Start: 2021-05-13 | End: 2021-07-14

## 2021-05-13 RX ORDER — ARIPIPRAZOLE 2 MG/1
1 TABLET ORAL DAILY
Qty: 15 TABLET | Refills: 0 | Status: SHIPPED | OUTPATIENT
Start: 2021-05-13 | End: 2021-07-13

## 2021-05-13 RX ORDER — FAMOTIDINE 20 MG/1
20 TABLET, FILM COATED ORAL DAILY PRN
Qty: 30 TABLET | Refills: 0 | Status: ON HOLD | OUTPATIENT
Start: 2021-05-13 | End: 2021-07-14

## 2021-05-13 RX ADMIN — NICOTINE POLACRILEX 8 MG: 4 GUM, CHEWING BUCCAL at 09:34

## 2021-05-13 RX ADMIN — NALTREXONE HYDROCHLORIDE 50 MG: 50 TABLET, FILM COATED ORAL at 08:53

## 2021-05-13 RX ADMIN — Medication 25 MCG: at 08:54

## 2021-05-13 RX ADMIN — TRAZODONE HYDROCHLORIDE 50 MG: 50 TABLET ORAL at 20:58

## 2021-05-13 RX ADMIN — THIAMINE HCL TAB 100 MG 100 MG: 100 TAB at 08:54

## 2021-05-13 RX ADMIN — LISINOPRIL 20 MG: 20 TABLET ORAL at 08:54

## 2021-05-13 RX ADMIN — NICOTINE POLACRILEX 8 MG: 4 GUM, CHEWING BUCCAL at 20:58

## 2021-05-13 RX ADMIN — MULTIPLE VITAMINS W/ MINERALS TAB 1 TABLET: TAB at 08:54

## 2021-05-13 RX ADMIN — NICOTINE POLACRILEX 8 MG: 4 GUM, CHEWING BUCCAL at 12:59

## 2021-05-13 RX ADMIN — Medication 1 MG: at 08:53

## 2021-05-13 RX ADMIN — FOLIC ACID 1 MG: 1 TABLET ORAL at 08:53

## 2021-05-13 ASSESSMENT — MIFFLIN-ST. JEOR: SCORE: 1754.42

## 2021-05-13 ASSESSMENT — ACTIVITIES OF DAILY LIVING (ADL)
DRESS: SCRUBS (BEHAVIORAL HEALTH);INDEPENDENT
HYGIENE/GROOMING: INDEPENDENT
DRESS: INDEPENDENT
LAUNDRY: WITH SUPERVISION
ORAL_HYGIENE: INDEPENDENT
ORAL_HYGIENE: INDEPENDENT
HYGIENE/GROOMING: HANDWASHING;SHOWER;INDEPENDENT
LAUNDRY: WITH SUPERVISION

## 2021-05-13 NOTE — PLAN OF CARE
Work Completed:  Chart review  Team meeting  Pt attended writer's group on positive self-talk.  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 He reported he's feeling anxious about the debit card he was supposed to be receiving, disturbing his sleep last night and an ongoing concern today. Pt is considering getting on a bus in the morning to  the card himself and is trying to get a hold of the person hosting the interview for the construction job to try to reschedule it for early next week.   Pt says he received a call from Tadeo sarah and tried to return it but was unable to reach someone.     Discharge plan or goal: CD treatment with appropriate appointments/supports in place.                Barriers to discharge: No safe alternative to hospitalization, high risk for relapse without concrete plan for discharge. Medication management.

## 2021-05-13 NOTE — PLAN OF CARE
"Music Therapy Group note    Total time in session: 50 minutes     Number of patients in group: 4    Scope of service: psychodynamic     Patient progress: initial encounter     Patient response/reaction to treatment intervention(s): Jaunsz was open in his sharing in group today in response to a song about wanting to help somebody who had to decide they wanted help for themselves.  He also resonates with the song \"Lose Yourself\" by Sierra about being able to accomplish your goals and going for them.  Engaged affect, appropriate, positive group participant.     Almita Castillo, MT-BC  Board-Certified Music Therapist           "

## 2021-05-13 NOTE — PLAN OF CARE
"Patient presents with full range of affect. Is pleasant and cooperative. Reports mood as \"good.\" Patient rates anxiety at #4/10. Denies depression, SI, SIB, HI. Denies auditory/visual hallucinations. No medical issues noted. Ate evening meal. Patient out in the milieu occasionally walking the hallway. Patient requested and received PRN Trazodone at . Patient to discharge tomorrow Friday 5/14.     /80   Pulse 72   Temp 98  F (36.7  C) (Oral)   Resp 14   Ht 1.803 m (5' 11\")   Wt 86.2 kg (190 lb 1.6 oz)   SpO2 98%   BMI 26.51 kg/m      "

## 2021-05-13 NOTE — PROGRESS NOTES
"   05/13/21 1500   Psycho Education   Type of Intervention structured groups   Response participates, initiates socially appropriate   Hours 1   Treatment Detail   (Positive Self Talk)     Pt was active participant, reading material aloud and sharing ways he can incorporate positive self-talk into his life. He spoke of trying to surround himself with positive influences and brainstorming how to remain present and find enjoyable moments when he is by himself. Pt related his experience to a peer's in an appropriate manner, discussing confrontation of fear as a human experience. Pt shared an affirmation/positive statement he utilizes is \"tomorrow is another day\". Pt was future-oriented and generally curious about this topic, considering how to apply these ideas when he recognizes negative self-talk such as telling himself that he's stupid.   "

## 2021-05-13 NOTE — PROGRESS NOTES
[facility], [place]   Psychiatric Progress Note  Hospital Day: 4        Interim History:   The patient's care was discussed with the treatment team during the daily team meeting and/or staff's chart notes were reviewed.  Staff report patient was visible in the milieu, making phone calls, taking medications as prescribed, appropriate in interactions, denying SI, SIB; no acute events overnight.     Upon interview, patient reports he is still trying to figure out how he can get access to his debit card/funds and is working with his cousin and nephew to address this. He is hopeful he can have this figured out later this afternoon, still considering options for discharge, completed CD assessment and had phone call with one treatment center he felt would be a good fit and they offer treatment that would include culturally important options such as sweat lodges, etc that he feels would be helpful. He still is wrestling with CD treatment vs [facility]. He plans to push his interview with the [employer] liaison to Mon or Tuesday. Discussed possibly having him discharge to crisis bed for support over weekend and then could work on obtaining his funds and make decision on CD treatment vs work program. He is in agreement with this plan. Denies SI or HI, denies AVH. Feels his mood is pretty good currently outside of his anxiety around discharge planning. Denies having any SIB urges. Is feeling well physically. No additional concerns.          Medications:       ARIPiprazole  1 mg Oral Daily     folic acid  1 mg Oral Daily     lisinopril  20 mg Oral Daily     multivitamin w/minerals  1 tablet Oral Daily     naltrexone  50 mg Oral Daily     thiamine  100 mg Oral Daily     Vitamin D3  25 mcg Oral Daily          Allergies:     Allergies   Allergen Reactions     Bee Venom Anaphylaxis     Pt reported- has Epi Pen  Pt reported- has Epi Pen  Pt reported- has Epi Pen  Pt reported-  "has Epi Pen       Nuts Swelling     Tongue, throat swelling     Pistachio Nut Extract Skin Test Other (See Comments)     Tongue Swelling            Labs:     Recent Results (from the past 48 hour(s))   CK total    Collection Time: 05/12/21  7:13 AM   Result Value Ref Range    CK Total 197 30 - 300 U/L   Hepatic panel    Collection Time: 05/12/21  7:13 AM   Result Value Ref Range    Bilirubin Direct <0.1 0.0 - 0.2 mg/dL    Bilirubin Total 0.3 0.2 - 1.3 mg/dL    Albumin 3.3 (L) 3.4 - 5.0 g/dL    Protein Total 7.1 6.8 - 8.8 g/dL    Alkaline Phosphatase 84 40 - 150 U/L    ALT 27 0 - 70 U/L    AST 28 0 - 45 U/L          Psychiatric Examination:     /78   Pulse 63   Temp 97.3  F (36.3  C) (Oral)   Resp 16   Ht 1.803 m (5' 11\")   Wt 86.2 kg (190 lb 1.6 oz)   SpO2 96%   BMI 26.51 kg/m    Weight is 190 lbs 1.6 oz  Body mass index is 26.51 kg/m .    Orthostatic Vitals       Most Recent      Sitting Orthostatic /66 05/13 0853    Sitting Orthostatic Pulse (bpm) 63 05/13 0853    Standing Orthostatic /76 05/13 0853    Standing Orthostatic Pulse (bpm) 73 05/13 0853        Appearance: awake, alert, adequately groomed and dressed in hospital scrubs  Attitude:  cooperative  Eye Contact:  good  Mood:  anxious around stressors of discharge otherwise euthymic  Affect:  appropriate and in normal range and mood congruent  Speech:  clear, coherent and normal prosody  Language: fluent and intact in English  Psychomotor, Gait, Musculoskeletal:  no evidence of tardive dyskinesia, dystonia, or tics  Thought Process:  logical, linear and goal oriented  Associations:  no loose associations  Thought Content:  no evidence of suicidal ideation or homicidal ideation and no evidence of psychotic thought  Insight:  partial  Judgement:  fair  Oriented to:  time, person, and place  Attention Span and Concentration:  intact  Recent and Remote Memory:  intact  Fund of Knowledge:  appropriate    Clinical Global " Impressions  First:  Considering your total clinical experience with this particular patient population, how severe are the patient's symptoms at this time?: 7 (05/10/21 1908)  Compared to the patient's condition at the START of treatment, this patient's condition is: 4 (05/10/21 1908)  Most recent:  Considering your total clinical experience with this particular patient population, how severe are the patient's symptoms at this time?: 7 (05/10/21 1908)  Compared to the patient's condition at the START of treatment, this patient's condition is: 4 (05/10/21 1908)           Precautions:     Behavioral Orders   Procedures     Code 1 - Restrict to Unit     Elopement precautions     Routine Programming     As clinically indicated     Self Injury Precaution     Single Room     Status 15     Every 15 minutes.     Suicide precautions     Patients on Suicide Precautions should have a Combination Diet ordered that includes a Diet selection(s) AND a Behavioral Tray selection for Safe Tray - with utensils, or Safe Tray - NO utensils       Withdrawal precautions          Diagnoses:   Suicidal ideation   Unspecified depression   Borderline Personality Disorder  Alcohol use disorder, severe  Methamphetamine use disorder, severe  Cannabis use disorder, severe  Polysubstance abuse including IVDU resulting in Hep C  History of SIB by swallowing sharp objects  HTN  Vit D defiency   Elevated AST, likely alcohol induced hepatitis, resolved         Assessment & Plan:   Assessment and hospital summary:  This patient is a 50 year old male with history of depression, substance induced psychosis and polysubstance abuse who presented to [facility] ED with SI in context of being off of medications for over a week and continuing to use substances including bath salts, methamphetamine, cannabis and alcohol. Pt was noted to be disorganized, tangential in the ED, this improved by time of admission interview on 30N. He was more organized,  logical, denying further SI. Denies any urges to engage in SIB by swallowing objects. Displayed insight into the impact substance use has on his mood, he is agreeable [facility] CD evaluation [facility] He is agreeable to signing in as a voluntary patient. Reports stability in his mood over the past year with use of Abilify and would like to continue with this medication. He denies having any acute physical health concerns.      Inpatient psychiatric hospitalization is warranted at this time for safety, stabilization, and possible adjustment in medications.    Psychiatric treatment/inteventions:  Medications:   Medications:   -continue PTA aripiprazole 1mg daily for mood, SI and psychosis   -continue multivitamin, folic acid, thiamine for alcohol use  -continue PTA naltrexone 50mg daily for polysubstance use  -continue PTA Vit D3 25mcg daily      Laboratory/Imaging:hepatic panel WNL and CK WNL     Patient will be treated in therapeutic milieu with appropriate individual and group therapies as described.     Medical treatment/interventions:  Medical concerns:   1) EtOH withdrawal with pt reporting hx of complicated withdrawal including DTs  -MSSA discontinued 5/11, pt does not appear to be in withdrawal   -withdrawal precautions     2) Hypertension   -resume PTA lisinopril 20mg daily   -monitor VS, consult IM if indicated     3) Vit D defiency  -continue PTA Vit D as above  -Vit D level was 25, WNL     4) Elevated CK, likely 2/2 substance use  -CK trending down as above, recheck on 5/12 was WNL  -encourage hydration   -continue to monitor and alert IM of any acute concerns      5) Elevated AST with hx of Hep C  -likely alcohol induced hepatitis  -trended and WNL on 5/12  -if continues to be elevated may consider holding naltrexone      This note was created by undersigned using a Dragon dictation system. All typing errors or contextual distortion are unintentional and software inherent.      Disposition Plan   Reason for ongoing admission: no safe alternative to hospitalization, pt does not have any money or access to his funds until likely 5/14, would be high risk for relapse and return of SI if discharged without plan/support in place and play to stay  Discharge location: Chemical dependency treatment facility vs community/crisis bed, likely tomorrow  Discharge Medications: ordered.  Follow-up Appointments: scheduled  Legal Status: voluntary  Entered by: Joyce Mares on 5/13/2021 at 2:52 PM

## 2021-05-13 NOTE — PLAN OF CARE
Pt appears to have slept 6.75 hours this shift.    Pt continues on suicide, SIB, elopement, withdrawal and single room precautions.     No concerns noted or reported at this time, will continue to monitor and support plan of care.

## 2021-05-13 NOTE — PLAN OF CARE
Pt was out in the common areas most of the shift.  Pt attended and participated in select groups.  Pt has full range of affect,  denies any  suicidal thoughts. Pt continues to consider his future plans. Pt is considering work program, CD tx or taking  bus to [place] to stay with his mother.  Pt is planning on discharge tomorrow.

## 2021-05-13 NOTE — PLAN OF CARE
Problem: OT General Care Plan  Goal: OT Goal 1  Description: OT Goal 1  Outcome: No Change     Pt attended the OT discussion group which involved reading a selected quote, discussing it's meaning, and relating it to personal life experience or situation.    Pt related the quote he selected about about discharge tomorrow, and not knowing whether he should pursue treatment, or getting a job.  Discussed having gone to treatment many times before, and what would potentially be different this time. Pt also brought up having an opportunity for a job through a 'felony-friendly' employer, and described ways he would find sober support though meetings and a sponsor, something he hasn't done before.

## 2021-05-14 VITALS
SYSTOLIC BLOOD PRESSURE: 125 MMHG | HEART RATE: 60 BPM | DIASTOLIC BLOOD PRESSURE: 73 MMHG | TEMPERATURE: 97.6 F | OXYGEN SATURATION: 98 % | WEIGHT: 190.1 LBS | RESPIRATION RATE: 16 BRPM | HEIGHT: 71 IN | BODY MASS INDEX: 26.61 KG/M2

## 2021-05-14 PROCEDURE — 250N000013 HC RX MED GY IP 250 OP 250 PS 637: Performed by: PSYCHIATRY & NEUROLOGY

## 2021-05-14 PROCEDURE — 99239 HOSP IP/OBS DSCHRG MGMT >30: CPT | Performed by: PSYCHIATRY & NEUROLOGY

## 2021-05-14 PROCEDURE — G0177 OPPS/PHP; TRAIN & EDUC SERV: HCPCS

## 2021-05-14 RX ADMIN — LISINOPRIL 20 MG: 20 TABLET ORAL at 09:56

## 2021-05-14 RX ADMIN — NICOTINE POLACRILEX 8 MG: 4 GUM, CHEWING BUCCAL at 10:05

## 2021-05-14 RX ADMIN — MULTIPLE VITAMINS W/ MINERALS TAB 1 TABLET: TAB at 09:56

## 2021-05-14 RX ADMIN — THIAMINE HCL TAB 100 MG 100 MG: 100 TAB at 09:55

## 2021-05-14 RX ADMIN — Medication 1 MG: at 09:56

## 2021-05-14 RX ADMIN — Medication 25 MCG: at 09:56

## 2021-05-14 RX ADMIN — FOLIC ACID 1 MG: 1 TABLET ORAL at 09:55

## 2021-05-14 RX ADMIN — NALTREXONE HYDROCHLORIDE 50 MG: 50 TABLET, FILM COATED ORAL at 09:56

## 2021-05-14 ASSESSMENT — ACTIVITIES OF DAILY LIVING (ADL)
HYGIENE/GROOMING: INDEPENDENT
DRESS: INDEPENDENT
ORAL_HYGIENE: INDEPENDENT
LAUNDRY: WITH SUPERVISION

## 2021-05-14 NOTE — PROGRESS NOTES
Patient discharging 5/14/2021 accompanied by Janusz Escalera and destination is back to her home.    Discharge paperwork reviewed and medications reviewed with Janusz Escalera who verbalizes understanding.     Patient has verbalized understanding of discharge instructions and medication administration.     Patient states that he is ready for discharge. Patient denies SI and self injurious thoughts.Denies homicidal ideation. Reports having some anxiety and not feeling depressed right now. Denies auditory and visual hallucinations. Ate meals. Med compliant. Pleasant and cooperative on the unit.     Was given bus tokens.      Copy of AVS reviewed and signed.      Medications from pharmacy received and signed for.     Security item received and signed for.    Locker items received and signed for.

## 2021-05-14 NOTE — PLAN OF CARE
Pt appears to have slept 7 hours this shift.    Pt continues on suicide, SIB, elopement, withdrawal and single room precautions.     No concerns noted or reported at this time, will continue to monitor and support plan of care.

## 2021-05-14 NOTE — DISCHARGE SUMMARY
Psychiatric Discharge Summary    Janusz Escalera MRN# [id number]   Age: 50 year old YOB: 1970     Date of Admission:  5/9/2021  Date of Discharge:  5/14/2021  2:45 PM  Admitting Physician:  Joyce Mares DO  Discharge Physician:  Joyce Mares DO         Event Leading to Hospitalization:   Janusz is a 50 year old male with history of depression, substance induced psychosis and polysubstance abuse who presented to [facility] ED with SI in context of being off of medications for over a week and continuing to use substances including bath salts, methamphetamine, cannabis and alcohol.      Per ED physician note: Janusz Escalera is a 50 year old male who presents with concerns of suicidal ideation with plan and alcohol and bath salt use.  Patient's had a long history of suicide attempts before including hanging's and overdoses in the past.  Patient is supposed to be on Abilify and other medications that he has not been taking for the last 2 weeks.  Patient states that he got sober and clean over the winter but over the last month or so he started using again.  In the last couple of days he started having thoughts of wanting to kill himself.  [place] the [place] and thought about jumping in but then got scared because he did not think anyone would find him.  He was backed down by the river again today thinking about jumping in or possibly hanging himself.  The police found him and took him to his nephew's house to bring him here to the emergency department.  Patient admits to last using bath salts 2 days ago they did want to use again yesterday because he hallucinates sometimes on those.  He has a history of alcohol use and abuse and felt like he was kind of withdrawing the last couple days.  He could not get a hold of regular alcohol so he drank Listerine.  Patient does not feel safe being alone right now.  Patient denies any current chest pain or shortness of breath or belly  "pain.     Upon interview pt reports that his mood had been relatively stable over past year, he was staying with his sister in [place] and given the remote location of where he was he found it easier to stay sober but then started to become bored and thinking about what \"was next\" and this led to his relapse on alcohol, he reports he drank once and then again a few days later and then led to a nolasco of drinking for the past 10 days. He also stopped taking medications during this time. He reports a long standing history of polysubstance use and went to detox twice during these relapses, once he left he was offered some methamphetamine and bath salts. He believes this is what led to his presentation in the ED as it \"messed with [his] mind\". He does recall having suicidal ideation but states that he was thinking of the fact it was mothers day and this caused him to think of his mom so he sought help instead. He states after sleeping last night he is feeling more clear today and denies any further suicidal ideation. He has been told he has many mental health diagnosis including bipolar disorder and schizophrenia. He does not agree with these diagnoses. He denies having psychosis symptoms when sober and states his mood is pretty euthymic when he is not using. He states he was feeling down and hopeless when on the methamphetamine but today he is not feeling depressed, he has had some sleep difficulty and changes with appetite but denies low mood or problems enjoying things. He is anxious around stressors such as his living situation and financial issues but denies panic attacks. He would like to continue with the Abilify he has been taking and is agreeable to having a CD assessment completed. He notes aside from alcohol, methamphetamine and bath salts he also uses cannabis regularly. He denies recent IVDU, does have use in the past with Hep C diagnosis. Denies having any acute medical concerns, he has not taken his HTN " medication for the past week as well. He would like to be referred to a residential program and then may plan to go to [place] to stay with his mom for some time however does note his mother uses alcohol and this may not be the most supportive environment for his sobriety.        See Admission note by Joyce Mares DO on 5/10/21 for additional details.          Diagnoses:     Suicidal ideation, improved  Unspecified depression, likely substance induced  Borderline Personality Disorder  Alcohol use disorder, severe  Methamphetamine use disorder, severe  Cannabis use disorder, severe  Polysubstance abuse including IVDU resulting in Hep C  History of SIB by swallowing sharp objects  HTN  Vit D defiency   Elevated AST, likely alcohol induced hepatitis, resolved         Labs:     Recent Results (from the past 336 hour(s))   CBC with platelets differential    Collection Time: 05/09/21 12:05 PM   Result Value Ref Range    WBC 9.6 4.0 - 11.0 10e9/L    RBC Count 4.34 (L) 4.4 - 5.9 10e12/L    Hemoglobin 13.3 13.3 - 17.7 g/dL    Hematocrit 38.8 (L) 40.0 - 53.0 %    MCV 89 78 - 100 fl    MCH 30.6 26.5 - 33.0 pg    MCHC 34.3 31.5 - 36.5 g/dL    RDW 12.8 10.0 - 15.0 %    Platelet Count 215 150 - 450 10e9/L    Diff Method Automated Method     % Neutrophils 82.9 %    % Lymphocytes 10.9 %    % Monocytes 5.0 %    % Eosinophils 0.5 %    % Basophils 0.5 %    % Immature Granulocytes 0.2 %    Nucleated RBCs 0 0 /100    Absolute Neutrophil 8.0 1.6 - 8.3 10e9/L    Absolute Lymphocytes 1.1 0.8 - 5.3 10e9/L    Absolute Monocytes 0.5 0.0 - 1.3 10e9/L    Absolute Eosinophils 0.1 0.0 - 0.7 10e9/L    Absolute Basophils 0.1 0.0 - 0.2 10e9/L    Abs Immature Granulocytes 0.0 0 - 0.4 10e9/L    Absolute Nucleated RBC 0.0    Comprehensive metabolic panel    Collection Time: 05/09/21 12:05 PM   Result Value Ref Range    Sodium 139 133 - 144 mmol/L    Potassium 3.8 3.4 - 5.3 mmol/L    Chloride 103 94 - 109 mmol/L    Carbon Dioxide 23 20 - 32  mmol/L    Anion Gap 13 3 - 14 mmol/L    Glucose 66 (L) 70 - 99 mg/dL    Urea Nitrogen 23 7 - 30 mg/dL    Creatinine 0.81 0.66 - 1.25 mg/dL    GFR Estimate >90 >60 mL/min/[1.73_m2]    GFR Estimate If Black >90 >60 mL/min/[1.73_m2]    Calcium 9.1 8.5 - 10.1 mg/dL    Bilirubin Total 1.8 (H) 0.2 - 1.3 mg/dL    Albumin 4.0 3.4 - 5.0 g/dL    Protein Total 8.3 6.8 - 8.8 g/dL    Alkaline Phosphatase 107 40 - 150 U/L    ALT 36 0 - 70 U/L    AST 73 (H) 0 - 45 U/L   INR    Collection Time: 05/09/21 12:05 PM   Result Value Ref Range    INR 1.04 0.86 - 1.14   PTT    Collection Time: 05/09/21 12:05 PM   Result Value Ref Range    PTT 27 22 - 37 sec   CK total    Collection Time: 05/09/21 12:05 PM   Result Value Ref Range    CK Total 1,383 (HH) 30 - 300 U/L   Alcohol ethyl    Collection Time: 05/09/21 12:05 PM   Result Value Ref Range    Ethanol g/dL <0.01 <0.01 g/dL   Magnesium    Collection Time: 05/09/21 12:05 PM   Result Value Ref Range    Magnesium 2.1 1.6 - 2.3 mg/dL   Asymptomatic SARS-CoV-2 COVID-19 Virus (Coronavirus) by PCR    Collection Time: 05/09/21 12:05 PM    Specimen: Nasopharyngeal   Result Value Ref Range    SARS-CoV-2 Virus Specimen Source Nasopharyngeal     SARS-CoV-2 PCR Result NEGATIVE     SARS-CoV-2 PCR Comment (Note)    UA reflex to Microscopic    Collection Time: 05/09/21  1:20 PM   Result Value Ref Range    Color Urine Yellow     Appearance Urine Clear     Glucose Urine Negative NEG^Negative mg/dL    Bilirubin Urine Negative NEG^Negative    Ketones Urine 80 (A) NEG^Negative mg/dL    Specific Gravity Urine 1.026 1.003 - 1.035    Blood Urine Small (A) NEG^Negative    pH Urine 5.0 5.0 - 7.0 pH    Protein Albumin Urine 30 (A) NEG^Negative mg/dL    Urobilinogen mg/dL 0.0 0.0 - 2.0 mg/dL    Nitrite Urine Negative NEG^Negative    Leukocyte Esterase Urine Negative NEG^Negative    Source Unspecified Urine     RBC Urine <1 0 - 2 /HPF    WBC Urine 3 0 - 5 /HPF    Bacteria Urine Few (A) NEG^Negative /HPF    Mucous  Urine Present (A) NEG^Negative /LPF    Hyaline Casts 1 0 - 2 /LPF   Urine Drugs of Abuse Screen Panel 13    Collection Time: 05/09/21  1:20 PM   Result Value Ref Range    Cannabinoids (49-wna-5-carboxy-9-THC) Detected, Abnormal Result (A) NDET^Not Detected ng/mL    Phencyclidine (Phencyclidine) Not Detected NDET^Not Detected ng/mL    Cocaine (Benzoylecgonine) Not Detected NDET^Not Detected ng/mL    Methamphetamine (d-Methamphetamine) Detected, Abnormal Result (A) NDET^Not Detected ng/mL    Opiates (Morphine) Not Detected NDET^Not Detected ng/mL    Amphetamine (d-Amphetamine) Detected, Abnormal Result (A) NDET^Not Detected ng/mL    Benzodiazepines (Nordiazepam) Detected, Abnormal Result (A) NDET^Not Detected ng/mL    Tricyclic Antidepressants (Desipramine) Not Detected NDET^Not Detected ng/mL    Methadone (Methadone) Not Detected NDET^Not Detected ng/mL    Barbiturates (Butalbital) Not Detected NDET^Not Detected ng/mL    Oxycodone (Oxycodone) Not Detected NDET^Not Detected ng/mL    Propoxyphene (Norpropoxyphene) Not Detected NDET^Not Detected ng/mL    Buprenorphine (Buprenorphine) Not Detected NDET^Not Detected ng/mL   CK total    Collection Time: 05/09/21  3:30 PM   Result Value Ref Range    CK Total 1,062 (HH) 30 - 300 U/L   CK total    Collection Time: 05/09/21  6:01 PM   Result Value Ref Range    CK Total 1,054 (HH) 30 - 300 U/L   TSH with free T4 reflex and/or T3 as indicated    Collection Time: 05/10/21  7:40 AM   Result Value Ref Range    TSH 3.19 0.40 - 4.00 mU/L   Lipid panel    Collection Time: 05/10/21  7:40 AM   Result Value Ref Range    Cholesterol 176 <200 mg/dL    Triglycerides 97 <150 mg/dL    HDL Cholesterol 59 >39 mg/dL    LDL Cholesterol Calculated 98 <100 mg/dL    Non HDL Cholesterol 117 <130 mg/dL   Vitamin D    Collection Time: 05/10/21  7:40 AM   Result Value Ref Range    Vitamin D Deficiency screening 25 20 - 75 ug/L   CK total    Collection Time: 05/10/21  7:40 AM   Result Value Ref Range     CK Total 665 (H) 30 - 300 U/L   CK total    Collection Time: 05/12/21  7:13 AM   Result Value Ref Range    CK Total 197 30 - 300 U/L   Hepatic panel    Collection Time: 05/12/21  7:13 AM   Result Value Ref Range    Bilirubin Direct <0.1 0.0 - 0.2 mg/dL    Bilirubin Total 0.3 0.2 - 1.3 mg/dL    Albumin 3.3 (L) 3.4 - 5.0 g/dL    Protein Total 7.1 6.8 - 8.8 g/dL    Alkaline Phosphatase 84 40 - 150 U/L    ALT 27 0 - 70 U/L    AST 28 0 - 45 U/L              Consults:   5/11/2021     CD consult completed.   Pt wants referrals to the following below. Pt discussed maybe doing his work program. If he cannot get into the work program he will go to treatment. We discussed how he has been to multiple treatments, that maybe having structure such as a job might be helpful for him to stay sober. We decided on sending out referrals as he then has a back up plan. I said if he discharged he can have the numbers to his referrals to follow up and my number if he may need further referrals.      Recommendations:   1. Abstain from all non-prescribed mood-altering substances  2. Take all medications as prescribed  3. Enter and complete a [facility] residential or [facility] with lodging program  4. Follow all recommendations upon discharge from treatment. Recommendations may include, but are not limited to: extended treatment, outpatient treatment and/or sober housing.  5. Follow all recommendations of your medical providers     Referrals:  Tadeo Cuellar  [address], [place] [address]  Phone Number: [phone]  Fax Number: [phone]  Admissions Phone Number: [phone]     [address] [address]   Phone: [phone]   Fax: [phone]      [facility]  Phone: [phone]  Fax: [phone]  [facility] [address] [place] [address]     [facility]  Phone: [phone]  Fax: [phone]   [address]  [address] [place] [address]     BARBARA consult completed by:  Maddie Quintanilla, [facility].         Hospital Course:   Janusz Escalera is a 50 year old male with history of depression, substance induced psychosis and polysubstance abuse who presented to [facility] ED with SI in context of being off of medications for over a week and continuing to use substances including bath salts, methamphetamine, cannabis and alcohol. Pt was noted to be disorganized, tangential in the ED, this improved by time of admission interview on 30N. He was more organized, logical, denying further SI. Denied any urges to engage in SIB by swallowing objects. Displayed insight into the impact substance use has on his mood, he was agreeable to CD evaluation and referral to [facility] residential program.. He was admitted on a 72HH and was  agreeable to signing in as a voluntary patient. Reported stability in his mood over the past year with use of Abilify when sober and would like to continue with this medication, other PTA medications continued. He denied having any acute physical health concerns. Labs were trended as above due to abnormalities in the ED including elevated CK, which improved prior to discharge. He was visible in the milieu, participated in some groups. He had some anxiety around discharge planning, he completed CD assessment and had phone call with one treatment center he felt would be a good fit and they offer treatment that would include culturally important options such as sweat lodges, etc that he feels would be helpful. He felt conflicted between going to CD treatment vs a work opportunity through [employer] program. He made arrangements to have an in person interview with the [employer] liaison to Monday or Tuesday. Discussed possibly having him discharge to crisis bed for support over weekend and then could work on obtaining his funds and make decision on CD treatment vs work program. He declined this option, reported he planned to take the train to [facility] and stay at a motel near his  family for increased support. He was offered referral to crisis bed several times with team members discussing concern for not having increased structured support upon discharge especially in context of maintaining his sobriety and the impact this often has on his mood/mental health. He reported he planned to attend [employer] and AA meetings and reach out to family and other sober supports upon discharge. He requested to discharge 5/14.     Today Janusz Escalera reports no thoughts of harming self or others. In addition, he has notable risk factors for self-harm, including age, single status, anxiety, psychosis, substance abuse and previous suicide attempts. However, risk is mitigated by commitment to family, ability to volunteer a safety plan and history of seeking help when needed. Therefore, based on all available evidence including the factors cited above, he does not appear to be at imminent risk for self-harm, does not meet criteria for a 72-hr hold, and therefore remains appropriate for ongoing outpatient level of care.     Janusz Escalera was discharged to community. At the time of discharge Janusz Escalera was determined to not be a danger to himself or others.          Discharge Medications:     Current Discharge Medication List      CONTINUE these medications which have CHANGED    Details   ARIPiprazole (ABILIFY) 2 MG tablet Take 0.5 tablets (1 mg) by mouth daily  Qty: 15 tablet, Refills: 0    Associated Diagnoses: Suicidal ideation      famotidine (PEPCID) 20 MG tablet Take 1 tablet (20 mg) by mouth daily as needed (heartburn)  Qty: 30 tablet, Refills: 0    Associated Diagnoses: Polysubstance abuse (H)      folic acid (FOLVITE) 1 MG tablet Take 1 tablet (1 mg) by mouth daily  Qty: 30 tablet, Refills: 0    Associated Diagnoses: Polysubstance abuse (H)      lisinopril (ZESTRIL) 20 MG tablet Take 1 tablet (20 mg) by mouth daily  Qty: 30 tablet, Refills: 0    Associated Diagnoses: Essential hypertension, benign       naltrexone (DEPADE/REVIA) 50 MG tablet Take 1 tablet (50 mg) by mouth daily  Qty: 30 tablet, Refills: 0    Associated Diagnoses: Polysubstance abuse (H)      nicotine (NICORETTE) 2 MG gum Place 1-2 each (2-4 mg) inside cheek every hour as needed for other (nicotine withdrawal symptoms)  Qty: 240 each, Refills: 0    Associated Diagnoses: Polysubstance abuse (H)      Vitamin D3 (CHOLECALCIFEROL) 25 mcg (1000 units) tablet Take 1 tablet (25 mcg) by mouth daily  Qty: 30 tablet, Refills: 0    Associated Diagnoses: Vitamin D deficiency                  Psychiatric Examination:   Appearance: awake, alert, adequately groomed and dressed in hospital scrubs  Attitude:  cooperative  Eye Contact:  good  Mood:  description consistent with euthymia  Affect:  appropriate and in normal range and mood congruent  Speech:  clear, coherent and normal prosody  Language: fluent and intact in English  Psychomotor, Gait, Musculoskeletal:  no evidence of tardive dyskinesia, dystonia, or tics  Thought Process:  logical, linear and goal oriented  Associations:  no loose associations  Thought Content:  no evidence of suicidal ideation or homicidal ideation and no evidence of psychotic thought  Insight:  partial  Judgement:  fair, adequate for safety   Oriented to:  time, person, and place  Attention Span and Concentration:  intact  Recent and Remote Memory:  intact  Fund of Knowledge:  appropriate         Discharge Plan:   Health Care Follow-up:   Appointment Date/Time: Wednesday, May 19 at 12pm via phone (it will likely show up as a blocked caller)    Therapist: Lui Brower    Address: [facility] [address], [place] [address]   Phone Number: [phone]       Appointment Date/Time: Thursday, May 20 at 4:40pm  via phone    (20 minute appointment)  Medication Management: Kathy Gastelum      Address: [facility]     Phone Number: [phone]     Attend all scheduled  appointments with your outpatient providers. Call at least 24 hours in advance if you need to reschedule an appointment to ensure continued access to your outpatient providers.     Resources:   Crisis Home Resources:   [facility]  [address] [place] [address]  Phone Number: [phone]     [facility], [facility]. [address] [place] [address]  Phone Number: [phone]      [facility]  [address] [place] [address]  Phone Number: [phone]     [facility]  [address] [place] [address]  Phone Number: [phone]     [facility]  [address] [place] [address]  Phone Number: [phone], [phone] (admissions)     [facility]  [address] [place] [address]  Phone Number: [phone]        Chemical Dependency Referrals Information:  Tadeo Cuellar  [address] [place], [place] [address]  Phone Number: [phone]  Fax Number: [phone]  Admissions Phone Number: [phone]     [address] [address]   Phone: [phone]   Fax: [phone]      [facility]  Phone: [phone]  Fax: [phone]  [facility] [address], [place] [address]     [facility]  Phone: [phone]  Fax: [phone]   [address]  [address] [place] [address]     BARBARA consult completed by: LIBORIO Waldron  Phone Number: [phone]  E-mail Address: [email or web address] Evaluation Department  [address] [place] [address]       Attestation:  Patient has been seen and evaluated by me, Joyce Mares DO on day of discharge. 35 minutes were spent in coordination of discharge planning.

## 2021-05-14 NOTE — PLAN OF CARE
Work Completed:  Chart review  Team meeting  Pt resolved the issue with his debit card, he is feeling more relieved now and is more focused on figuring out his discharge plan. Team has discussed finding a crisis bed opening for the weekend.   Outpatient medication management appointment scheduled. Pt signed JUSTIN for [facility].   Pt is still open to attending CD treatment but it sounds like that's his back up plan if the work training program doesn't work out.   Writer spoke with intake from [facility] CD treatment program (), they would like to speak with pt about possible admission.   Pt decided he wants to take a train to [place] to stay with his nephew until the work training interview next week. Bus tokens provided to RN at time of discharge.  AVS is complete.    Discharge plan or goal: Home with family, appropriate appointments and resources in place.                Barriers to discharge: Pt is discharging today.

## 2021-05-14 NOTE — DISCHARGE INSTRUCTIONS
Behavioral Discharge Planning and Instructions    Summary: You were admitted on 5/9/2021  due to Suicidal Ideations and Chemical Use Issues.  You were treated by Dr. Mares and discharged on 5/14/2021 from [facility] to Home with nephew.    Main Diagnosis:   Suicidal ideation   Unspecified depression   Borderline Personality Disorder  Alcohol use disorder, severe  Alcohol withdrawal, severe  Methamphetamine use disorder, severe  Cannabis use disorder, severe  Polysubstance abuse including IVDU resulting in Hep C  History of SIB by swallowing sharp objects  HTN  Vit D defiency   Elevated AST, likely alcohol induced hepatitis    Health Care Follow-up:   Appointment Date/Time: Wednesday, May 19 at 12pm via phone (it will likely show up as a blocked caller)    Therapist: Lui Brower    Address: [facility] [address], [place] [address]   Phone Number: [phone]      Appointment Date/Time: Thursday, May 20 at 4:40pm  via phone    (20 minute appointment)  Medication Management: Kathy Gastelum      Address: [facility]     Phone Number: [phone]    Attend all scheduled appointments with your outpatient providers. Call at least 24 hours in advance if you need to reschedule an appointment to ensure continued access to your outpatient providers.     Major Treatments, Procedures and Findings:  You were provided with: a psychiatric assessment and CD evaluation/assessment    Symptoms to Report: feeling more aggressive, increased confusion, losing more sleep, mood getting worse or thoughts of suicide    Early warning signs can include: increased depression or anxiety sleep disturbances increased thoughts or behaviors of suicide or self-harm  increased unusual thinking, such as paranoia or hearing voices    Safety and Wellness:  Take all medicines as directed.  Make no changes unless your doctor suggests them.      Follow treatment recommendations.   Refrain from alcohol and non-prescribed drugs.  If there is a concern for safety, call 911.    Resources:   Crisis Home Resources:   [facility]  [address] [place] [address]  Phone Number: [phone]    [facility], [address] [address]  Phone Number: [phone]     [facility]  [address] [place] [address]  Phone Number: [phone]    [facility]  [address] [place] [address]  Phone Number: [phone]    [facility]  [address] [place] [address]  Phone Number: [phone], [phone] (admissions)    [facility]  [address] [place] [address]  Phone Number: [phone]      Chemical Dependency Referrals Information:  Tadeo Cuellar  [address], [place] [address]  Phone Number: [phone]  Fax Number: [phone]  Admissions Phone Number: [phone]     [address] [address]   Phone: [phone]   Fax: [phone]      [facility]  Phone: [phone]  Fax: [phone]  [facility] [address], [place] [address]     [facility]than  Phone: [phone]  Fax: [phone]  [place] [address]     BARBARA consult completed by: LIBORIO Waldron  Phone Number: [phone]  E-mail Address: [email or web address] and Addiction [email or web address] Evaluation Department  [address] [place] [address]    General Mental Health and Crisis Resources:  Crisis Intervention: 143.302.9034 or 448-952-6748 (TTY: 409.551.5697).  Call anytime for help.  National [place] on Mental Illness (www.[place].dangelo.org): 279.786.7181 or 292-974-8334.  Alcoholics Anonymous (www.alcoholics-anonymous.org): Check your phone book for your local chapter.  Suicide Awareness Voices of  "Education (SAVE) (www.save.org): 210-317-GJNG (7283)  National Suicide Prevention Line (www.mentalhealthmn.org): 545-401-YVPV (6174)  [facility] Crisis (COPE) Response - Adult 772 330-1696  [facility] Crisis Response - Adult 184 229-9800  Crisis text line: Text \"[place]\" to [address]8037. Free, confidential, 24/7.  Crisis Intervention: 611.778.3466 or 238-462-0411. Call anytime for help.   [facility], and [facility] Mobile Crisis Response Team (CRT):  [phone] or 089-304-6300   [place]: Find AA meetings (https://aam[place].org/)  [facility] Alcoholics Anonymous: 560.508.4319 (https://aamin[place].org/)      General Medication Instructions:   See your medication sheet(s) for instructions.   Take all medicines as directed.  Make no changes unless your doctor suggests them.   Go to all your doctor visits.  Be sure to have all your required lab tests. This way, your medicines can be refilled on time.  Do not use any drugs not prescribed by your doctor.  Avoid alcohol.    Advance Directives:   Scanned document on file with [employer]? No scanned doc  Is document scanned? Pt states no documents  Honoring Choices Your Rights Handout: Informed and given  Was more information offered? Pt declined    The Treatment team has appreciated the opportunity to work with you. If you have any questions or concerns about your recent admission, you can contact the unit which can receive your call 24 hours a day, 7 days a week. They will be able to get in touch with a Provider if needed. The unit number is [phone].  "

## 2021-07-13 ENCOUNTER — HOSPITAL ENCOUNTER (EMERGENCY)
Facility: CLINIC | Age: 51
Discharge: PSYCHIATRIC OR SUBSTANCE USE FACILITY | End: 2021-07-14
Attending: FAMILY MEDICINE | Admitting: FAMILY MEDICINE
Payer: COMMERCIAL

## 2021-07-13 DIAGNOSIS — F10.929 ALCOHOLIC INTOXICATION WITH COMPLICATION (H): ICD-10-CM

## 2021-07-13 DIAGNOSIS — R45.851 SUICIDAL IDEATION: ICD-10-CM

## 2021-07-13 LAB
ALCOHOL BREATH TEST: 0.21 (ref 0–0.01)
AMPHETAMINES UR QL: NOT DETECTED
BARBITURATES UR QL SCN: NOT DETECTED
BENZODIAZ UR QL SCN: NOT DETECTED
BUPRENORPHINE UR QL: NOT DETECTED
CANNABINOIDS UR QL: DETECTED
COCAINE UR QL SCN: NOT DETECTED
D-METHAMPHET UR QL: NOT DETECTED
METHADONE UR QL SCN: NOT DETECTED
OPIATES UR QL SCN: NOT DETECTED
OXYCODONE UR QL SCN: NOT DETECTED
PCP UR QL SCN: NOT DETECTED
PROPOXYPH UR QL: NOT DETECTED
TRICYCLICS UR QL SCN: NOT DETECTED

## 2021-07-13 PROCEDURE — 82075 ASSAY OF BREATH ETHANOL: CPT | Performed by: FAMILY MEDICINE

## 2021-07-13 PROCEDURE — 82075 ASSAY OF BREATH ETHANOL: CPT | Mod: 91 | Performed by: FAMILY MEDICINE

## 2021-07-13 PROCEDURE — 96372 THER/PROPH/DIAG INJ SC/IM: CPT | Performed by: FAMILY MEDICINE

## 2021-07-13 PROCEDURE — 99285 EMERGENCY DEPT VISIT HI MDM: CPT | Performed by: FAMILY MEDICINE

## 2021-07-13 PROCEDURE — 250N000011 HC RX IP 250 OP 636: Performed by: FAMILY MEDICINE

## 2021-07-13 PROCEDURE — C9803 HOPD COVID-19 SPEC COLLECT: HCPCS | Performed by: FAMILY MEDICINE

## 2021-07-13 PROCEDURE — 99285 EMERGENCY DEPT VISIT HI MDM: CPT | Mod: 25 | Performed by: FAMILY MEDICINE

## 2021-07-13 PROCEDURE — 80306 DRUG TEST PRSMV INSTRMNT: CPT | Performed by: FAMILY MEDICINE

## 2021-07-13 PROCEDURE — 90791 PSYCH DIAGNOSTIC EVALUATION: CPT

## 2021-07-13 RX ORDER — ARIPIPRAZOLE 5 MG/1
TABLET ORAL
COMMUNITY
Start: 2021-06-16 | End: 2021-07-14

## 2021-07-13 RX ORDER — OXCARBAZEPINE 300 MG/1
TABLET, FILM COATED ORAL
COMMUNITY
Start: 2021-06-16 | End: 2021-07-14

## 2021-07-13 RX ORDER — TRAZODONE HYDROCHLORIDE 50 MG/1
50 TABLET, FILM COATED ORAL
COMMUNITY
Start: 2019-11-08 | End: 2021-07-14

## 2021-07-13 RX ORDER — DIAZEPAM 10 MG
TABLET ORAL
COMMUNITY
Start: 2021-06-25 | End: 2021-07-14

## 2021-07-13 RX ORDER — OLANZAPINE 10 MG/2ML
10 INJECTION, POWDER, FOR SOLUTION INTRAMUSCULAR ONCE
Status: COMPLETED | OUTPATIENT
Start: 2021-07-13 | End: 2021-07-13

## 2021-07-13 RX ORDER — LORAZEPAM 2 MG/ML
2 INJECTION INTRAMUSCULAR ONCE
Status: COMPLETED | OUTPATIENT
Start: 2021-07-13 | End: 2021-07-13

## 2021-07-13 RX ORDER — PROPRANOLOL HYDROCHLORIDE 80 MG/1
CAPSULE, EXTENDED RELEASE ORAL
COMMUNITY
Start: 2020-07-16 | End: 2021-07-14

## 2021-07-13 RX ORDER — LANOLIN ALCOHOL/MO/W.PET/CERES
CREAM (GRAM) TOPICAL
COMMUNITY
Start: 2020-11-12 | End: 2021-07-14

## 2021-07-13 RX ORDER — ACETAMINOPHEN 325 MG/1
650 TABLET ORAL
COMMUNITY
Start: 2020-06-03 | End: 2021-07-14

## 2021-07-13 RX ORDER — OLANZAPINE 10 MG/1
10 TABLET ORAL AT BEDTIME
Status: ON HOLD | COMMUNITY
Start: 2021-05-24 | End: 2021-07-15

## 2021-07-13 RX ADMIN — LORAZEPAM 2 MG: 2 INJECTION INTRAMUSCULAR; INTRAVENOUS at 22:35

## 2021-07-13 RX ADMIN — OLANZAPINE 10 MG: 10 INJECTION, POWDER, LYOPHILIZED, FOR SOLUTION INTRAMUSCULAR at 22:35

## 2021-07-14 ENCOUNTER — TELEPHONE (OUTPATIENT)
Dept: BEHAVIORAL HEALTH | Facility: CLINIC | Age: 51
End: 2021-07-14

## 2021-07-14 ENCOUNTER — HOSPITAL ENCOUNTER (INPATIENT)
Facility: HOSPITAL | Age: 51
LOS: 5 days | Discharge: HOME | End: 2021-07-19
Attending: PSYCHIATRY & NEUROLOGY | Admitting: PSYCHIATRY & NEUROLOGY
Payer: COMMERCIAL

## 2021-07-14 VITALS
SYSTOLIC BLOOD PRESSURE: 147 MMHG | RESPIRATION RATE: 16 BRPM | OXYGEN SATURATION: 98 % | WEIGHT: 195 LBS | BODY MASS INDEX: 27.2 KG/M2 | DIASTOLIC BLOOD PRESSURE: 97 MMHG | HEART RATE: 70 BPM

## 2021-07-14 DIAGNOSIS — K21.9 GASTROESOPHAGEAL REFLUX DISEASE, UNSPECIFIED WHETHER ESOPHAGITIS PRESENT: ICD-10-CM

## 2021-07-14 DIAGNOSIS — G47.9 SLEEP DISTURBANCES: ICD-10-CM

## 2021-07-14 DIAGNOSIS — F19.10 POLYSUBSTANCE ABUSE (H): ICD-10-CM

## 2021-07-14 DIAGNOSIS — R45.851 SUICIDAL IDEATIONS: Primary | ICD-10-CM

## 2021-07-14 DIAGNOSIS — Z78.9 NEED FOR NICOTINE REPLACEMENT THERAPY: ICD-10-CM

## 2021-07-14 DIAGNOSIS — I10 ESSENTIAL HYPERTENSION: ICD-10-CM

## 2021-07-14 PROBLEM — T44.3X1A ANTICHOLINERGIC DRUG OVERDOSE: Status: ACTIVE | Noted: 2017-02-26

## 2021-07-14 PROBLEM — T50.902A: Status: ACTIVE | Noted: 2019-01-31

## 2021-07-14 PROBLEM — B35.4 TINEA CORPORIS: Status: ACTIVE | Noted: 2017-12-17

## 2021-07-14 PROBLEM — H17.9 BILATERAL CORNEA SCARS: Status: ACTIVE | Noted: 2017-11-09

## 2021-07-14 PROBLEM — F10.939 ALCOHOL WITHDRAWAL (H): Status: ACTIVE | Noted: 2021-07-14

## 2021-07-14 PROBLEM — F19.90 MISUSE OF DRUGS: Status: ACTIVE | Noted: 2020-08-10

## 2021-07-14 PROBLEM — R07.81 RIB PAIN: Status: ACTIVE | Noted: 2020-10-24

## 2021-07-14 PROBLEM — S12.01XA: Status: ACTIVE | Noted: 2021-07-14

## 2021-07-14 PROBLEM — T50.901A ACCIDENTAL OVERDOSE: Status: ACTIVE | Noted: 2017-02-26

## 2021-07-14 PROBLEM — F06.30 MOOD DISORDER DUE TO KNOWN PHYSIOLOGICAL CONDITION, UNSPECIFIED: Status: ACTIVE | Noted: 2019-10-21

## 2021-07-14 PROBLEM — E66.9 OBESITY WITH BODY MASS INDEX OF 30.0-39.9: Status: ACTIVE | Noted: 2019-12-06

## 2021-07-14 PROBLEM — F60.3 BORDERLINE PERSONALITY DISORDER (H): Status: ACTIVE | Noted: 2020-08-10

## 2021-07-14 PROBLEM — G92.9 TOXIC ENCEPHALOPATHY: Status: ACTIVE | Noted: 2020-09-28

## 2021-07-14 PROBLEM — F10.229 ACUTE ALCOHOLIC INTOXICATION IN ALCOHOLISM (H): Status: ACTIVE | Noted: 2020-10-23

## 2021-07-14 PROBLEM — Z86.19 HEPATITIS C VIRUS INFECTION RESOLVED AFTER ANTIVIRAL DRUG THERAPY: Status: ACTIVE | Noted: 2019-10-21

## 2021-07-14 PROBLEM — S12.000A C1 CERVICAL FRACTURE (H): Status: ACTIVE | Noted: 2020-05-28

## 2021-07-14 PROBLEM — H52.201: Status: ACTIVE | Noted: 2017-11-09

## 2021-07-14 PROBLEM — H27.02 APHAKIA, LEFT: Status: ACTIVE | Noted: 2017-11-09

## 2021-07-14 PROBLEM — H52.202: Status: ACTIVE | Noted: 2017-11-09

## 2021-07-14 PROBLEM — F43.12 CHRONIC POST-TRAUMATIC STRESS DISORDER (PTSD): Status: ACTIVE | Noted: 2020-08-10

## 2021-07-14 PROBLEM — H52.11: Status: ACTIVE | Noted: 2017-11-09

## 2021-07-14 PROBLEM — H52.02: Status: ACTIVE | Noted: 2017-11-09

## 2021-07-14 LAB
ALCOHOL BREATH TEST: 0.12 (ref 0–0.01)
SARS-COV-2 RNA RESP QL NAA+PROBE: NEGATIVE

## 2021-07-14 PROCEDURE — 124N000001 HC R&B MH

## 2021-07-14 PROCEDURE — 250N000013 HC RX MED GY IP 250 OP 250 PS 637: Performed by: EMERGENCY MEDICINE

## 2021-07-14 PROCEDURE — 250N000013 HC RX MED GY IP 250 OP 250 PS 637: Performed by: NURSE PRACTITIONER

## 2021-07-14 PROCEDURE — 87635 SARS-COV-2 COVID-19 AMP PRB: CPT | Performed by: EMERGENCY MEDICINE

## 2021-07-14 PROCEDURE — 99223 1ST HOSP IP/OBS HIGH 75: CPT | Performed by: NURSE PRACTITIONER

## 2021-07-14 RX ORDER — CLONIDINE HYDROCHLORIDE 0.1 MG/1
0.1 TABLET ORAL 2 TIMES DAILY PRN
Status: DISCONTINUED | OUTPATIENT
Start: 2021-07-14 | End: 2021-07-19 | Stop reason: HOSPADM

## 2021-07-14 RX ORDER — NICOTINE 21 MG/24HR
1 PATCH, TRANSDERMAL 24 HOURS TRANSDERMAL DAILY
Status: DISCONTINUED | OUTPATIENT
Start: 2021-07-14 | End: 2021-07-14 | Stop reason: HOSPADM

## 2021-07-14 RX ORDER — LANOLIN ALCOHOL/MO/W.PET/CERES
100 CREAM (GRAM) TOPICAL DAILY
Status: ON HOLD | COMMUNITY
End: 2021-07-14

## 2021-07-14 RX ORDER — ACETAMINOPHEN 325 MG/1
650 TABLET ORAL EVERY 4 HOURS PRN
Status: DISCONTINUED | OUTPATIENT
Start: 2021-07-14 | End: 2021-07-19 | Stop reason: HOSPADM

## 2021-07-14 RX ORDER — HYDROXYZINE HYDROCHLORIDE 25 MG/1
50-100 TABLET, FILM COATED ORAL EVERY 4 HOURS PRN
Status: DISCONTINUED | OUTPATIENT
Start: 2021-07-14 | End: 2021-07-19 | Stop reason: HOSPADM

## 2021-07-14 RX ORDER — LANOLIN ALCOHOL/MO/W.PET/CERES
3-6 CREAM (GRAM) TOPICAL
Status: DISCONTINUED | OUTPATIENT
Start: 2021-07-14 | End: 2021-07-19 | Stop reason: HOSPADM

## 2021-07-14 RX ORDER — TRAZODONE HYDROCHLORIDE 50 MG/1
50-100 TABLET, FILM COATED ORAL
Status: DISCONTINUED | OUTPATIENT
Start: 2021-07-14 | End: 2021-07-19 | Stop reason: HOSPADM

## 2021-07-14 RX ORDER — LISINOPRIL 20 MG/1
20 TABLET ORAL DAILY
Status: ON HOLD | COMMUNITY
End: 2021-07-19

## 2021-07-14 RX ORDER — MULTIPLE VITAMINS W/ MINERALS TAB 9MG-400MCG
1 TAB ORAL DAILY
Status: DISCONTINUED | OUTPATIENT
Start: 2021-07-15 | End: 2021-07-19 | Stop reason: HOSPADM

## 2021-07-14 RX ORDER — POLYETHYLENE GLYCOL 3350 17 G/17G
17 POWDER, FOR SOLUTION ORAL DAILY PRN
Status: DISCONTINUED | OUTPATIENT
Start: 2021-07-14 | End: 2021-07-16

## 2021-07-14 RX ORDER — MAGNESIUM HYDROXIDE/ALUMINUM HYDROXICE/SIMETHICONE 120; 1200; 1200 MG/30ML; MG/30ML; MG/30ML
30 SUSPENSION ORAL EVERY 4 HOURS PRN
Status: DISCONTINUED | OUTPATIENT
Start: 2021-07-14 | End: 2021-07-19 | Stop reason: HOSPADM

## 2021-07-14 RX ORDER — DIAZEPAM 5 MG
10 TABLET ORAL EVERY 30 MIN PRN
Status: DISCONTINUED | OUTPATIENT
Start: 2021-07-14 | End: 2021-07-19 | Stop reason: HOSPADM

## 2021-07-14 RX ORDER — FOLIC ACID 1 MG/1
1 TABLET ORAL DAILY
Status: DISCONTINUED | OUTPATIENT
Start: 2021-07-15 | End: 2021-07-19 | Stop reason: HOSPADM

## 2021-07-14 RX ADMIN — NICOTINE POLACRILEX 4 MG: 2 GUM, CHEWING ORAL at 21:21

## 2021-07-14 RX ADMIN — Medication 1 PATCH: at 13:16

## 2021-07-14 RX ADMIN — TRAZODONE HYDROCHLORIDE 100 MG: 50 TABLET ORAL at 21:25

## 2021-07-14 RX ADMIN — NICOTINE POLACRILEX 4 MG: 2 GUM, CHEWING ORAL at 18:36

## 2021-07-14 RX ADMIN — NICOTINE POLACRILEX 4 MG: 2 GUM, CHEWING ORAL at 20:15

## 2021-07-14 ASSESSMENT — ACTIVITIES OF DAILY LIVING (ADL)
ORAL_HYGIENE: INDEPENDENT
DRESS: SCRUBS (BEHAVIORAL HEALTH)
HYGIENE/GROOMING: INDEPENDENT
LAUNDRY: WITH SUPERVISION

## 2021-07-14 NOTE — ED PROVIDER NOTES
Signout received at change of shift from Dr. Richard Gil.  See his note for patient presenting details.  He 10-year-old male presenting to the ED via EMS for alcohol intoxication and suicidal ideation.  Has known history of mental health disorders and polysubstance abuse.  Does not have any definitive plan for suicide today, but did have a serious suicide attempt in the past where he attempted to hang himself.  Only failed this attempt because the tree branch broke.  Had been given Zyprexa and Ativan in the ED and is now asleep.  Breathalyzer on arrival revealed a breath alcohol level of 0.208, and repeat test this morning was 0.124.  Will need to have a D EC evaluation    Patient sleeping at this time, no acute distress, even respirations, vital signs stable.  When he wakes up he is okay to have a breakfast tray.    0939 book with Shira,  with D EC.  Reviewed the patient case and work-up.  Is medically cleared at this time.  Is no longer intoxicated and has been sitting up to eat breakfast is able to communicate.  Will speak with the patient and call back    1025 Shira called back after speaking with Marah.  She states that she is quite concerned about him.  She was able to speak with his sister who is also concerned that he is experiencing psychosis, but is together enough to be able to deny symptoms.  He does not take medications that he has been prescribed and she is very worried about his suicidal thoughts.  She explains that he would have succeeded at his previous suicide attempt hanging if the tree branch had not broke.  Janusz is agreeable to seeking inpatient care.  His health officer hold has run out at this time and so he is placed on a 72-hour hold here in the ED.  He is medically cleared for inpatient placement.  They will call us back when a bed is available    Patient accepted to [facility] by Dr. Perez.  RN gave reports, EMS arrived to transport patient.  He was cooperative with transport and left the  department in no acute distress.     Kortney Melgoza,   07/14/21 1448       Kortney Melgoza,   07/14/21 1449

## 2021-07-14 NOTE — ED NOTES
Provider was made aware of the health officer hold expiring at 1017.  She will place pt on a 72 hour hold if needed, awaiting DEC's assessment which was completed.

## 2021-07-14 NOTE — ED NOTES
Pt given his rights from the 72 hour hold and discussed admission.  Covid swab completed.  Pt up to bathroom and provided additional fluids.  Ordered a lunch tray.

## 2021-07-14 NOTE — ED TRIAGE NOTES
Patient was picked up in the park in [place] after drinking a bottle of yellow listerine. He stated he wasn't worth anything and just wanted to die. Patient was placed on hold by PD and this writer.

## 2021-07-14 NOTE — ED PROVIDER NOTES
[facility] ED Provider Note   Patient: Janusz Escalera  MRN #:  [id number]  Date of Visit: July 13, 2021    CC:     Chief Complaint   Patient presents with     Alcohol Intoxication     Suicidal     HPI:  Janusz Escalera is a 50 year old male who presented to the emergency department by ambulance with alcohol intoxication, and suicidal ideation.  Patient states that he has been drinking mouthwash for the past 2 days.  He is supposed to go to chemical dependency treatment.  He has been staying with his sister.  Patient states that he does not care if he dies, and was trying to elope from the emergency department.  Additional history is difficult to obtain at this time due to the patient's acute agitation and intoxication.  Patient has a history of mental health problems in addition to polysubstance chemical dependency and alcohol dependence.  His last mental health admission was on May 17.  Patient was recently in the emergency department at [facility] in July 1 with alcohol intoxication when he was found by the side of the road asleep next to a half empty bottle of whiskey.  Prior to that he has had alcohol ingestion of Listerine mouthwash.  Mental health diagnoses include depression, psychosis, mood disorder, and previous suicide attempts including hanging and overdose.  Patient denies any recent use of methamphetamines, bath salts or cannabis.    Problem List:  Patient Active Problem List    Diagnosis Date Noted     Suicidal ideation 05/09/2021     Priority: Medium     Suicide attempt (H) 05/21/2017     Priority: Medium     Methamphetamine abuse (H) 12/23/2016     Priority: Medium     Depressive disorder 04/29/2016     Priority: Medium     Foreign body (FB) in soft tissue 04/23/2016     Priority: Medium     Suicidal behavior 06/17/2014     Priority: Medium     Swallowed foreign body 06/04/2014     Priority: Medium     Esophageal foreign body  06/01/2014     Priority: Medium     Foreign body ingestion 05/31/2014     Priority: Medium     Foreign body in alimentary tract 05/26/2014     Priority: Medium     S/P laparotomy 05/26/2014     Priority: Medium     Leucocytosis 05/26/2014     Priority: Medium     DVT prophylaxis 05/26/2014     Priority: Medium     Delirium 05/26/2014     Priority: Medium     Chronic hepatitis C (H) 05/25/2014     Priority: Medium     Tobacco use disorder 05/25/2014     Priority: Medium     Heroin use 05/25/2014     Priority: Medium     Marijuana use 05/25/2014     Priority: Medium       Past Medical History:   Diagnosis Date     Depressive disorder      Foreign body alimentary tract 05/2014     Hepatitis C      History of alcohol abuse      IV drug user      Nasal bones, closed fracture 1992     Personal history of tobacco use, presenting hazards to health      Polysubstance dependence (H)      Treatment        MEDS: acetaminophen (TYLENOL) 325 MG tablet  thiamine (B-1) 100 MG tablet  traZODone (DESYREL) 50 MG tablet  ARIPiprazole (ABILIFY) 5 MG tablet  diazepam (VALIUM) 10 MG tablet  famotidine (PEPCID) 20 MG tablet  folic acid (FOLVITE) 1 MG tablet  lisinopril (ZESTRIL) 20 MG tablet  naltrexone (DEPADE/REVIA) 50 MG tablet  nicotine (NICORETTE) 2 MG gum  OLANZapine (ZYPREXA) 10 MG tablet  OXcarbazepine (TRILEPTAL) 300 MG tablet  propranolol ER (INDERAL LA) 80 MG 24 hr capsule  Vitamin D3 (CHOLECALCIFEROL) 25 mcg (1000 units) tablet        ALLERGIES:    Allergies   Allergen Reactions     Bee Venom Anaphylaxis     Pt reported- has Epi Pen  Pt reported- has Epi Pen  Pt reported- has Epi Pen  Pt reported- has Epi Pen       Nuts Swelling     Tongue, throat swelling     Pistachio Nut Extract Skin Test Other (See Comments)     Tongue Swelling         Past Surgical History:   Procedure Laterality Date     ENDOSCOPIC UPPER GASTROINTESTINAL, REMOVE SUTURE  6/4/2014    Procedure: ENDOSCOPIC UPPER GASTROINTESTINAL, REMOVE SUTURE;  Surgeon:  Rashard Ross MD;  Location: [facility] OR     ESOPHAGOSCOPY, GASTROSCOPY, DUODENOSCOPY (EGD), COMBINED  5/25/2014    Procedure: COMBINED ESOPHAGOSCOPY, GASTROSCOPY, DUODENOSCOPY (EGD);  Surgeon: Jourdan Tom MD;  Location: [facility] OR     ESOPHAGOSCOPY, GASTROSCOPY, DUODENOSCOPY (EGD), COMBINED N/A 4/23/2016    Procedure: COMBINED ESOPHAGOSCOPY, GASTROSCOPY, DUODENOSCOPY (EGD);  Surgeon: Calixto Conklin MD;  Location: [facility] OR     LAPAROTOMY EXPLORATORY  5/25/2014    Procedure: LAPAROTOMY EXPLORATORY;  Surgeon: Jourdan Tom MD;  Location: [facility] OR       Social History     Tobacco Use     Smoking status: Current Every Day Smoker     Packs/day: 0.50     Types: Cigarettes     Smokeless tobacco: Former User   Substance Use Topics     Alcohol use: Yes     Comment:  Occasional     Drug use: Yes     Types: Methamphetamines, IV     Comment: heroin, synthetics. Acid  Benadryl 30 at a time.         Review of Systems   Except as noted in HPI, all other systems were reviewed and are negative    Physical Exam     Vitals were reviewed  Patient Vitals for the past 24 hrs:   BP Pulse Resp SpO2 Weight   07/14/21 0615 120/74 66 -- -- --   07/14/21 0600 119/74 68 -- -- --   07/14/21 0545 122/70 64 -- -- --   07/14/21 0530 115/78 67 -- -- --   07/14/21 0515 119/73 64 -- -- --   07/14/21 0500 115/81 67 -- -- --   07/14/21 0445 114/81 72 -- -- --   07/14/21 0430 117/78 74 -- -- --   07/14/21 0415 114/74 73 -- -- --   07/14/21 0400 124/77 67 -- -- --   07/14/21 0345 117/79 69 -- -- --   07/14/21 0330 124/87 71 -- -- --   07/14/21 0315 120/75 66 -- -- --   07/14/21 0300 122/85 70 -- -- --   07/14/21 0245 124/83 75 -- -- --   07/14/21 0230 121/85 99 -- -- --   07/14/21 0215 115/89 74 -- -- --   07/14/21 0200 (!) 126/94 82 -- 99 % --   07/14/21 0145 127/82 79 -- 97 % --   07/14/21 0130 115/78 72 (!) 44 95 % --   07/14/21 0115 (!) 143/99 76 18 94 % --   07/14/21 0100 (!) 152/102 75 28 94 % --   07/14/21 0045 (!) 144/95 78 19 97 %  --   07/14/21 0030 138/89 78 (!) 42 96 % --   07/14/21 0015 (!) 143/85 79 21 96 % --   07/14/21 0000 136/87 80 20 96 % --   07/13/21 2345 (!) 142/82 80 24 95 % --   07/13/21 2330 (!) 150/87 96 (!) 43 96 % --   07/13/21 2315 (!) 144/81 78 23 96 % --   07/13/21 2300 134/82 74 21 97 % --   07/13/21 2245 124/66 71 -- 94 % --   07/13/21 2227 124/86 98 14 92 % 88.5 kg (195 lb)     GENERAL APPEARANCE: Patient is agitated, getting up and down from bed, trying to leave.  He is requiring face-to-face monitoring  FACE: normal facies  EYES: Pupils are equal; conjunctiva is injected  HENT: normal external exam  RESP: normal respiratory effort; clear breath sounds bilaterally  CV: regular rate and rhythm; no significant murmurs, gallops or rubs  ABD: soft, no tenderness; no rebound or guarding; bowel sounds are normal  EXT: No visible injuries  SKIN: Is intact  NEURO: no facial droop; no focal deficits, speech is assured and slightly slurred  PSYCH: Patient is agitated      Available Lab/Imaging Results     Results for orders placed or performed during the hospital encounter of 07/13/21 (from the past 24 hour(s))   Urine Drugs of Abuse Screen Panel 13   Result Value Ref [facility]    Cannabinoids (84-bpu-6-carboxy-9-THC) Detected (A) Not Detected, Indeterminate    Phencyclidine Not Detected Not Detected, Indeterminate    Cocaine (Benzoylecgonine) Not Detected Not Detected, Indeterminate    Methamphetamine (d-Methamphetamine) Not Detected Not Detected, Indeterminate    Opiates (Morphine) Not Detected Not Detected, Indeterminate    Amphetamine (d-Amphetamine) Not Detected Not Detected, Indeterminate    Benzodiazepines (Nordiazepam) Not Detected Not Detected, Indeterminate    Tricyclic Antidepressants (Desipramine) Not Detected Not Detected, Indeterminate    Methadone Not Detected Not Detected, Indeterminate    Barbiturates (Butalbital) Not Detected Not Detected, Indeterminate    Oxycodone Not Detected Not Detected, Indeterminate     Propoxyphene (Norpropoxyphene) Not Detected Not Detected, Indeterminate    Buprenorphine Not Detected Not Detected, Indeterminate   Alcohol breath test POCT   Result Value Ref [facility]    Alcohol Breath Test 0.208 (A) 0.00 - 0.01   Alcohol breath test POCT   Result Value Ref [facility]    Alcohol Breath Test 0.124 (A) 0.00 - 0.01                Impression     Final diagnoses:   Suicidal ideation   Alcoholic intoxication with complication (H)         ED Course & Medical Decision Making   Janusz Escalera is a 50 year old male who presented to the emergency department by ambulance with alcohol intoxication and suicide ideation.  Patient has been drinking mouthwash for the past 2 days.  He implied that he did not care whether he lived or , and wanted to leave the emergency department.  Patient was placed on a health officer hold.  He was agreeable to receive medication to help with his anxiety.  Patient received 10 mg of Zyprexa and 2 mg of Ativan IM.  Patient was able to lay back on the bed, and was sleeping for most of the night.  Initial breath alcohol level is 0.208.    7:01 AM: Breath alcohol level was rechecked at 0.124.  Patient was signed out to Dr. Melgoza at the change of shift.  Patient will need a behavioral health evaluation through the DEC when he is sober.      Disclaimer: This note consists of words and symbols derived from keyboarding and dictation using voice recognition software.  As a result, there may be errors that have gone undetected.  Please consider this when interpreting information found in this note.       Eulogio Gil MD  21 0701

## 2021-07-14 NOTE — TELEPHONE ENCOUNTER
S: Linh, [facility] ED, 50/M, SI    B: Pt BIB EMS  Pt has chronic mh issues  Hx of commitment  Pt had been drinking bottles of mouthwash and making suicidal statements  Pt has auditory hallucinations, pt denies currently, per sister report they are happening  Pt will respond to internal stimuli  Per sister pt only takes meds in hospital then will go off  Multiple med changes  Most recent hospitalization at [facility]. Multiple inpatient over the last 6 months  Pt on Abilify  Pt has CD hx tx. Meth, heroin, most recent alcohol, out of money so resorting to mouth wash  Pt is not aggressive  Pt is homeless  Pt at correction yesterday for disorderly conduct before coming in  Pt has SA attempt one year ago by hanging-branch broke  Pt reports laying on railroad tracks, and hopes when riding his bicycle he gets hit by lightening  Pt does not have plan parish.  Pt has racing thoughts  Pt reports not sleeping.  Pt reports not currently withdrawal but does have hx of withdrawal DT's, denies seizures. Reports getting shakey and sweats, nausea.    Patient cleared and ready for behavioral bed placement: Yes  UTOX pos for cannabis  COVID is neg    A:72hh    R: Kasie  11:51pm-provider accepted  12:07pm-unit, notified, can call for report when ready  12:13pm- ED notified

## 2021-07-14 NOTE — PLAN OF CARE
"ADMISSION NOTE    Reason for admission suicidal ideation/depression.  Safety concerns self-harm.  Risk for or history of violence none noted.   Full skin assessment: Abdominal surgical scar from \"removing blades after I swallowed them.'\" Scattered tattoos. Small facial abrasions.    Patient arrived on unit from [facility] ER accompanied by unit staff and security on 7/14/2021 at 1730.   Status on arrival: calm and cooperative  /98   Pulse 70   Temp 99.3  F (37.4  C) (Temporal)   Resp 16   SpO2 98%   Patient given tour of unit and Welcome to  unit papers given to patient, wanding completed, belongings inventoried, and admission assessment completed.   Patient's legal status on arrival is 72-hour hold. Appropriate legal rights discussed with and copy given to patient. Patient Bill of Rights discussed with and copy given to patient.   Patient denies SI, HI, and thoughts of self harm and contracts for safety while on unit.      Min Fernandez RN  7/14/2021  7:17 PM    SHIFT SUMMARY:    Calm and cooperative since admission. In the MHICU because of no bed availability on the open floor.   Denies pain, SI/HI, AH/VH, depression and anxiety. Agrees to contract for safety. Patient has clear, friendly speech and making good eye contact during conversation.  Patient reports being in [facility] for one night this Monday after being picked up by police for a disorderly conduct. Patient reports he was staying in a motel and was \"drinking and pounding on the walls\". Denies any history of violence, court date for charges is set for October.   Reports recently having a Rule 25 through [facility] via phone, unable to recall anything more specific about where this was done through. Reports being accepted to [facility] with pending admission after July 6th, stating, \"I don't know if I can still go there\". Patient states that alcohol \"is my problem\". Reports hx of meth and heroine use stating, \"it's been months\".  Ate supper. In the " lounge until 2120, socializing and watching TV.   PRN trazadone 100 mg PO at HS to promote sleep.  No S/S of ETOH withdrawal.    Shift report given to oncoming nurse.

## 2021-07-14 NOTE — ED NOTES
Pt complaining of headache at this time and requested water.  Provided pt with water, coffee, and a breakfast tray.  Pt calm and cooperative.  Vital signs completed at this time.  Pt continues on a continuous watch.

## 2021-07-14 NOTE — ED NOTES
EMS here for pt, report given.  Pt remains cooperative.  Belongings sent with EMS.  Called [facility] and updated them that pt left at this time.

## 2021-07-15 LAB
ALBUMIN SERPL-MCNC: 3.4 G/DL (ref 3.4–5)
ALBUMIN UR-MCNC: NEGATIVE MG/DL
ALP SERPL-CCNC: 102 U/L (ref 40–150)
ALT SERPL W P-5'-P-CCNC: 24 U/L (ref 0–70)
ANION GAP SERPL CALCULATED.3IONS-SCNC: 6 MMOL/L (ref 3–14)
APPEARANCE UR: CLEAR
AST SERPL W P-5'-P-CCNC: 25 U/L (ref 0–45)
BASOPHILS # BLD AUTO: 0 10E3/UL (ref 0–0.2)
BASOPHILS NFR BLD AUTO: 1 %
BILIRUB SERPL-MCNC: 0.4 MG/DL (ref 0.2–1.3)
BILIRUB UR QL STRIP: NEGATIVE
BUN SERPL-MCNC: 15 MG/DL (ref 7–30)
CALCIUM SERPL-MCNC: 8.6 MG/DL (ref 8.5–10.1)
CHLORIDE BLD-SCNC: 104 MMOL/L (ref 94–109)
CO2 SERPL-SCNC: 27 MMOL/L (ref 20–32)
COLOR UR AUTO: NORMAL
CREAT SERPL-MCNC: 0.87 MG/DL (ref 0.66–1.25)
EOSINOPHIL # BLD AUTO: 0.4 10E3/UL (ref 0–0.7)
EOSINOPHIL NFR BLD AUTO: 6 %
ERYTHROCYTE [DISTWIDTH] IN BLOOD BY AUTOMATED COUNT: 12.8 % (ref 10–15)
GFR SERPL CREATININE-BSD FRML MDRD: >90 ML/MIN/1.73M2
GLUCOSE BLD-MCNC: 93 MG/DL (ref 70–99)
GLUCOSE UR STRIP-MCNC: NEGATIVE MG/DL
HCT VFR BLD AUTO: 37.6 % (ref 40–53)
HGB BLD-MCNC: 13.4 G/DL (ref 13.3–17.7)
HGB UR QL STRIP: NEGATIVE
IMM GRANULOCYTES # BLD: 0 10E3/UL
IMM GRANULOCYTES NFR BLD: 0 %
KETONES UR STRIP-MCNC: NEGATIVE MG/DL
LEUKOCYTE ESTERASE UR QL STRIP: NEGATIVE
LYMPHOCYTES # BLD AUTO: 1.2 10E3/UL (ref 0.8–5.3)
LYMPHOCYTES NFR BLD AUTO: 20 %
MCH RBC QN AUTO: 31.2 PG (ref 26.5–33)
MCHC RBC AUTO-ENTMCNC: 35.6 G/DL (ref 31.5–36.5)
MCV RBC AUTO: 87 FL (ref 78–100)
MONOCYTES # BLD AUTO: 0.6 10E3/UL (ref 0–1.3)
MONOCYTES NFR BLD AUTO: 11 %
NEUTROPHILS # BLD AUTO: 3.7 10E3/UL (ref 1.6–8.3)
NEUTROPHILS NFR BLD AUTO: 62 %
NITRATE UR QL: NEGATIVE
NRBC # BLD AUTO: 0 10E3/UL
NRBC BLD AUTO-RTO: 0 /100
PH UR STRIP: 7 [PH] (ref 4.7–8)
PLATELET # BLD AUTO: 190 10E3/UL (ref 150–450)
POTASSIUM BLD-SCNC: 4.1 MMOL/L (ref 3.4–5.3)
PROT SERPL-MCNC: 7.6 G/DL (ref 6.8–8.8)
RBC # BLD AUTO: 4.3 10E6/UL (ref 4.4–5.9)
RBC URINE: 1 /HPF
SODIUM SERPL-SCNC: 137 MMOL/L (ref 133–144)
SP GR UR STRIP: 1.01 (ref 1–1.03)
UROBILINOGEN UR STRIP-MCNC: NORMAL MG/DL
WBC # BLD AUTO: 5.9 10E3/UL (ref 4–11)
WBC URINE: <1 /HPF

## 2021-07-15 PROCEDURE — 99222 1ST HOSP IP/OBS MODERATE 55: CPT | Performed by: NURSE PRACTITIONER

## 2021-07-15 PROCEDURE — 250N000013 HC RX MED GY IP 250 OP 250 PS 637: Performed by: NURSE PRACTITIONER

## 2021-07-15 PROCEDURE — 124N000001 HC R&B MH

## 2021-07-15 PROCEDURE — 82040 ASSAY OF SERUM ALBUMIN: CPT | Performed by: NURSE PRACTITIONER

## 2021-07-15 PROCEDURE — 81001 URINALYSIS AUTO W/SCOPE: CPT | Performed by: NURSE PRACTITIONER

## 2021-07-15 PROCEDURE — 85025 COMPLETE CBC W/AUTO DIFF WBC: CPT | Performed by: NURSE PRACTITIONER

## 2021-07-15 PROCEDURE — 36415 COLL VENOUS BLD VENIPUNCTURE: CPT | Performed by: NURSE PRACTITIONER

## 2021-07-15 RX ORDER — LISINOPRIL 10 MG/1
20 TABLET ORAL DAILY
Status: DISCONTINUED | OUTPATIENT
Start: 2021-07-15 | End: 2021-07-19 | Stop reason: HOSPADM

## 2021-07-15 RX ORDER — ARIPIPRAZOLE 5 MG/1
5 TABLET ORAL AT BEDTIME
Status: ON HOLD | COMMUNITY
End: 2021-07-19

## 2021-07-15 RX ORDER — NICOTINE 21 MG/24HR
1 PATCH, TRANSDERMAL 24 HOURS TRANSDERMAL DAILY
Status: DISCONTINUED | OUTPATIENT
Start: 2021-07-15 | End: 2021-07-19 | Stop reason: HOSPADM

## 2021-07-15 RX ORDER — ARIPIPRAZOLE 5 MG/1
5 TABLET ORAL AT BEDTIME
Status: DISCONTINUED | OUTPATIENT
Start: 2021-07-15 | End: 2021-07-19 | Stop reason: HOSPADM

## 2021-07-15 RX ORDER — OXCARBAZEPINE 300 MG/1
300 TABLET, FILM COATED ORAL DAILY
Status: ON HOLD | COMMUNITY
End: 2021-07-19

## 2021-07-15 RX ORDER — TRAZODONE HYDROCHLORIDE 50 MG/1
50 TABLET, FILM COATED ORAL AT BEDTIME
Status: ON HOLD | COMMUNITY
End: 2021-07-19

## 2021-07-15 RX ORDER — FAMOTIDINE 20 MG/1
20 TABLET, FILM COATED ORAL 2 TIMES DAILY
Status: ON HOLD | COMMUNITY
End: 2021-07-19

## 2021-07-15 RX ORDER — FAMOTIDINE 20 MG/1
20 TABLET, FILM COATED ORAL 2 TIMES DAILY
Status: DISCONTINUED | OUTPATIENT
Start: 2021-07-15 | End: 2021-07-19 | Stop reason: HOSPADM

## 2021-07-15 RX ADMIN — NICOTINE POLACRILEX 4 MG: 2 GUM, CHEWING ORAL at 20:23

## 2021-07-15 RX ADMIN — NICOTINE POLACRILEX 4 MG: 2 GUM, CHEWING ORAL at 13:34

## 2021-07-15 RX ADMIN — FOLIC ACID 1 MG: 1 TABLET ORAL at 08:35

## 2021-07-15 RX ADMIN — FAMOTIDINE 20 MG: 20 TABLET ORAL at 21:02

## 2021-07-15 RX ADMIN — ACETAMINOPHEN 650 MG: 325 TABLET, FILM COATED ORAL at 21:02

## 2021-07-15 RX ADMIN — HYDROXYZINE HYDROCHLORIDE 50 MG: 25 TABLET, FILM COATED ORAL at 13:37

## 2021-07-15 RX ADMIN — NICOTINE POLACRILEX 4 MG: 2 GUM, CHEWING ORAL at 09:47

## 2021-07-15 RX ADMIN — FAMOTIDINE 20 MG: 20 TABLET ORAL at 11:50

## 2021-07-15 RX ADMIN — ARIPIPRAZOLE 5 MG: 5 TABLET ORAL at 21:02

## 2021-07-15 RX ADMIN — NICOTINE 1 PATCH: 14 PATCH, EXTENDED RELEASE TRANSDERMAL at 12:07

## 2021-07-15 RX ADMIN — NICOTINE POLACRILEX 4 MG: 2 GUM, CHEWING ORAL at 16:12

## 2021-07-15 RX ADMIN — Medication 100 MG: at 08:35

## 2021-07-15 RX ADMIN — NICOTINE POLACRILEX 4 MG: 2 GUM, CHEWING ORAL at 18:02

## 2021-07-15 RX ADMIN — NICOTINE POLACRILEX 4 MG: 2 GUM, CHEWING ORAL at 12:25

## 2021-07-15 RX ADMIN — MULTIPLE VITAMINS W/ MINERALS TAB 1 TABLET: TAB at 08:35

## 2021-07-15 RX ADMIN — LISINOPRIL 20 MG: 10 TABLET ORAL at 11:50

## 2021-07-15 ASSESSMENT — ACTIVITIES OF DAILY LIVING (ADL)
LAUNDRY: UNABLE TO COMPLETE
ORAL_HYGIENE: INDEPENDENT
DRESS: SCRUBS (BEHAVIORAL HEALTH);INDEPENDENT
DRESS: SCRUBS (BEHAVIORAL HEALTH)
ORAL_HYGIENE: INDEPENDENT
LAUNDRY: UNABLE TO COMPLETE
HYGIENE/GROOMING: INDEPENDENT
HYGIENE/GROOMING: INDEPENDENT
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
## 2021-07-15 NOTE — PLAN OF CARE
Met with patient accompanied by POLLO. Patient was found lying down in his room. This writer inquired about report that patient had been planning to go to [facility] for treatment prior to his admission and whether I could help him arrange for that now. Patient reports he was considering outpatient treatment and states he has done treatment over twenty times but has never attempted outpatient. Patient described two recent treatment attempts within the last month at two separate [facility] locations, both of which the patient reports ended in him using while there and then choosing to leave. The patient made mention of not wanting to take up a bed that could be used by someone else if he's not sure he's serious about treatment. This writer informed patient that to get into outpatient treatment, he would need an  making a recommendation for that level of care, and it sounds like if he was being referred to [facility], his current level of care recommendation is for inpatient. Assured patient that many people need to attempt treatment multiple times before they are successful, and that he is just as important as anyone else who would fill the bed at treatment. This writer encouraged the patient to work through the difficult situations, as that is where he will find the most growth in CD treatment. The patient did ask this writer to contact [facility] to inquire whether they would still accept him and when he might be able to transfer. Patient signed a verbal JUSTIN.     This writer attempted to call [facility] and reached a general mailbox, so I sent an email directly to Dane, their intake person, regarding the patient's questions.     Received response from Dane that the patient is still accepted to [facility] and if we let them know when he is able to transfer there, he will arrange for transport.     Spoke to patient's NP, who is in agreement with patient transferring on Monday July 19th. This  writer emailed Dane from [facility] to ask him to arrange for transportation. SW has been updated.

## 2021-07-15 NOTE — PLAN OF CARE
Face to face shift report received from nurse. Rounding completed, pt observed.    Problem: Behavioral Health Plan of Care  Goal: Patient-Specific Goal (Individualization)  Description: Patient will eat at least 50% of meals.  Patient will attend at least 50% of group therapy sessions.  Patient will complete all ADL's independently while on the unit.     Outcome: No Change  Note: Patient sleeping at start of shift. Patient was moved unto 562 at 1am due to room availability. Patient was not impressed and angrily got dressed and moved without issue other then grumbles. Patient slept the rest of the shift.    Problem: Suicide Risk  Goal: Absence of Self-Harm  Description: Patient will contract for safety if having thoughts of self harm.   Patient will report absence of suicidal ideations prior to discharge.   Outcome: No Change     Face to face report will be communicated to oncoming RN.    Jin Medina RN  7/15/2021

## 2021-07-15 NOTE — PLAN OF CARE
List items sent to safe: birth certificate, 2 [place] ID cards, 1 silver colored bracelet, 1 gold colored bracelet, black wallet with chain attached, visa debit card, [place] [employer] card, [employer] card.   All other belongings put in assigned cubby in belongings room.   Gray socks, blue boxers, miscellaneous papers, 2 lighters, 3 bottles of shampoo, face wash, toothpaste, foot spray, toothbrush, deodorant, luffa, 4 lotions, 1 bar soap, hair brush, body spray, razor, 4 extra razor blades in case, 2 floss picks, chap stick, binoculars, black bandana, 2 books, can opener, flashlight for head, 2 nail clippers, razor set in black bag, markers, coloring book, black folder, 6 batteries, 1 silver colored key, master lock, roll of tape, pen, spoon, bag of paper towels, road map, chewing tobacco, insect repellent, 3 bottles of water, gray shirt, portable radio, orange scrub set, black boxers, camouflage shorts, black shirt, black shoes, blue and gray blanket, black backpack, chico theme sheet, black coffee mug, blue towel, blue swim trunks, black socks, gray sweats, gray shorts, black shorts, 2 garbage bags.     I have reviewed my belongings list on admission and verify that it is correct.     Patient signature_______________________________    Second staff witness (if patient unable to sign) ______________________________       I have received all my belongings at discharge.    Patient signature________________________________    Triny JAY  7/14/2021  8:20 PM

## 2021-07-15 NOTE — PLAN OF CARE
Prior to Admission Medication Reconciliation:     Medications added:   [] None  [x] As listed below:    Medications deleted:   [] None  [x] As listed below:        Medications marked for review/removal by attending:  [] None  [x] As listed below:    Trileptal- pt was taking while in treatment but decided he only wants to be on abilify    Changes made to existing medications:   [x] None  [] As listed below:    Last times/dates taken verified with patient:  [x] Yes- completed myself: pt reports not taking his medications for at least 2 weeks  [] Prepared PTA medlist for review only. (will not be available to review personally)  [] Did not review with patient. Rx verification only. Review completed by nursing.    [] Nurse completed no changes made (double checked entries)  [] Unable to review with patient at this time:  [] Nurse completed/changes made:     Allergies listed at another location:  []Not applicable   []See below:    Allergy review:    [x]Did not review: reviewed by nursing  []Did not review: pt unable at this time  []Patient/MAR verified NKDA  []Patient/MAR verified current existing allergies: no changes made    Medication reconciliation sources:   [x]Patient  []Patient family member/emergency contact: **  [][facility] Report Review  []Epic Chart Review  []Care Everywhere review  []Pharmacy med list: **  []Pharmacy phone call  []Outside meds dispense report  []Nursing home or Assisted Living MAR:  [x]Other: [facility] Discharge Medlist    Lisinopril 20 mg daily    Naltrexone 50 mg daily- opioid    pepcid 20 mg BID    Folic acid 1mg daily     Vitamin 25 mcg daily     abilify 5 mg hs    Trazodone 50 mg at bedtime     Triptal 300 mg at bedtime     Nicotine gum 4 mg 1 piece every 2 hours as needed    Standing orders: (not continued by patient after leaving)    Melatonin 3 mg 6 mg prn + once if needed    Benadryl 25 mg 50 mg at bedtime prn insomnia + repeat after 1 hour as needed     apap 325 mg  and  ibuprofen 600 mg tid PRN    Pharmacy desired at discharge: will have to get at discharge based on where pt is going    Is patient on coumadin?  [x]No    Requests for consultation by provider or pharmacist:   [x] Patient understands why all of their meds were prescribed and how to take them. No questions.   [] Managing party has no questions.   [] Patient/ managing party has questions about the following:  [] Did not review with patient. Cannot assess.     Fill dates and reported compliancy:  [] Fill dates coincide with compliancy for all/most maintenance meds.   [x] Fill dates do not coincide with compliancy with maintenance meds. See notes in PTA medlist and in comments.    [] Fill dates do not coincide with the following medications but pt reports compliancy:  [] Did not review with patient. Cannot assess.     Historian accuracy:  [] Excellent- alert and oriented, understands why meds were prescribed and how to take, able to answer specifics  [x] Good- alert and oriented, understands why meds were prescribed and how to take, some confusion   [] Fair- alert and oriented, doesn't know medications without list, cannot answer specifics about medications, but has a decent process for which to take at home  [] Poor- does not know medications, may not have a process to take at home, may be cognitively unable to review at this time  []Medication management done by family member or facility, no concerns about historian accuracy.   [] Did not review with patient. Cannot assess.     Medication Management:  [x] Manages meds independently  [] Family member/ other party manages meds:  [] Meds managed by staff at facility  [] Meds set up by home care, family/other party helps administer  [] Meds set up by home care, self administers  [] Did not review with patient. Cannot assess.     Other medications aside from PTA:  [x] Denies taking any medications aside from those listed in PTA meds  [] Reports taking another medication(s)  but cannot recall the name(s)  [] Refuses to say.  [] Did not review with patient. Cannot assess.     Comments: none    Dotty Kumar on 7/15/2021 at 10:43 AM       Discrepancies: [x] No []Not Applicable []Yes: listed below    Time spent on medication reconciliation:   []5-20 mins  [x]20-40 mins  []> 40 mins    Issues completing PTA medication reconciliation:  [] On hold for a long time  [] Waited for a call back  [] Fax didn't come through  [] Fax took a long time  [] Other:    Notifying appropriate party of changes/additions/discrepancies:  [x]No pertinent changes made, notification not necessary.   [] Notified attending provider via text page/phone call  [] Notified attending provider in person  [] Notified pharmacy  [] Notified nurse  [] Attending provider not available, left detailed notes  [] Pt is not admitted to floor yet, PTA meds completed before admission.  Medications Prior to Admission   Medication Sig Dispense Refill Last Dose     ARIPiprazole (ABILIFY) 5 MG tablet Take 5 mg by mouth At Bedtime    Past Month at Unknown time     Cholecalciferol (VITAMIN D3) 25 MCG (1000 UT) CAPS Take 1 capsule by mouth daily   Past Month at Unknown time     famotidine (PEPCID) 20 MG tablet Take 20 mg by mouth 2 times daily   Past Month at Unknown time     folic acid (FOLVITE) 1 MG tablet Take 1 tablet (1 mg) by mouth daily 30 tablet 0 Past Month at Unknown time     lisinopril (ZESTRIL) 20 MG tablet Take 20 mg by mouth daily    Past Month at Unknown time     naltrexone (DEPADE/REVIA) 50 MG tablet Take 1 tablet (50 mg) by mouth daily 30 tablet 0 Past Month at Unknown time     nicotine polacrilex (NICORETTE) 4 MG gum Place 4 mg inside cheek every hour as needed for smoking cessation (while awake)   Past Month at Unknown time     traZODone (DESYREL) 50 MG tablet Take 50 mg by mouth At Bedtime   Past Month at Unknown time     OXcarbazepine (TRILEPTAL) 300 MG tablet Take 300 mg by mouth daily (Patient not taking: Reported on  7/15/2021)   Not Taking at Unknown time         Medication Dispense History (from 7/15/2020 to 7/14/2021)  Expand All  Collapse All  ARIPiprazole   Dispensed Days Supply Quantity Provider Pharmacy   ARIPIPRAZOLE 5 MG TABS 06/16/2021 10 10 [facility]   ARIPIPRAZOLE 2 MG TABS 06/13/2021 30 15 [facility] ...   ARIPIPRAZOLE 2 MG TABS 04/22/2021 30 [facility]   ARIPIPRAZOLE 2 MG TABS 03/18/2021 30 15 [facility]   ARIPIPRAZOLE 2 MG TABS 02/17/2021 30 15 [facility]   ARIPIPRAZOLE 2 MG TABS 01/13/2021 30 15 [facility]   ARIPIPRAZOLE 2 MG TABS 12/08/2020 30 15 [facility]...   ARIPIPRAZOLE 2 MG TABS 11/12/2020 30 15 tablet RAFNEELAM,[facility]...   ARIPIPRAZOLE 2 MG TABS 11/04/2020 30 15 tablet MOOKIE BACA [facility]...         Famotidine   Dispensed Days Supply Quantity Provider Pharmacy   FAMOTIDINE 20 MG TABS 06/14/2021 30 60 tablet [facility] [place] ...   FAMOTIDINE 20 MG TABS 04/22/2021 30 30 [facility]...   FAMOTIDINE 20 MG TABS 03/18/2021 30 30 [facility]...   FAMOTIDINE 20 MG TABS 02/17/2021 30 30 [facility]...   FAMOTIDINE 20 MG TABS 01/13/2021 30 30 tablet RAFNEELAM,[facility]...   FAMOTIDINE 20 MG TABS 12/08/2020 30 30 tablet [facility]...   FAMOTIDINE 20 MG TABS 11/12/2020 30 30 tablet MOOKIE BACA [facility]...   HEARTBURN RELIEF 10 MG TABS 11/04/2020 30 60 tablet MOOKIE BACA [facility]...         Folic Acid   Dispensed Days Supply Quantity Provider Pharmacy   FOLIC ACID 1 MG TABS 06/14/2021 30 30 tablet [facility] ...   FOLIC ACID 1 MG TABS  04/22/2021 30 30 tablet [facility]...   FOLIC ACID 1 MG TABS 03/18/2021 30 30 tablet [facility]...   FOLIC ACID 1 MG TABS 02/17/2021 30 30 tablet [facility]...   FOLIC ACID 1 MG TABS 01/04/2021 30 [facility]   FOLIC ACID 1 MG TABS 12/08/2020 30 30 [facility]...   FOLIC ACID 1 MG TABS 11/12/2020 30 30 [facility]...   FOLIC ACID 1 MG TABS 11/04/2020 30 30 tablet [facility]IE B[facility] [place]...         Lisinopril   Dispensed Days Supply Quantity Provider Pharmacy   LISINOPRIL 20 MG TABS 06/14/2021 30 30 tablet GLADYS,[facility] ...   LISINOPRIL 20 MG TABS 05/13/2021 30 30 [facility]...   LISINOPRIL 40 MG TABS 04/22/2021 30 15 tablet [facility]...   LISINOPRIL 40 MG TABS 02/17/2021 30 15 tablet [facility]...   LISINOPRIL 40 MG TABS 12/08/2020 30 30 [facility]...   LISINOPRIL 40 MG TABS 11/12/2020 30 30 [facility]...   LISINOPRIL 40 MG TABS 11/04/2020 30 30 tablet RAFMOOKIE RICHTER [facility] [place]...         Naltrexone HCl   Dispensed Days Supply Quantity Provider Pharmacy   NALTREXONE HCL 50 MG TABS 06/14/2021 30 30 tablet FONDONG,[facility] ...   NALTREXONE HCL 50 MG TABS 05/24/2021 30 30 [facility]...   NALTREXONE HCL 50 MG TABS 04/22/2021 30 30 tablet [facility]...   NALTREXONE HCL 50 MG TABS 03/22/2021 30 30 tablet HOWARD KHANNA Pharmacy...   NALTREXONE HCL 50 MG TABS 02/17/2021 30 30 tablet [facility]...   NALTREXONE HCL 50 MG TABS 01/13/2021 30 30 tablet MOOKIE BACA [facility]...         Nicotine   Dispensed Days Supply Quantity Provider Pharmacy    NICOTINE 14 MG/24HR PT24 11/25/2020 28 28 patch HOWARD KHANNA Pharmacy...   NICOTINE 14 MG/24HR PT24 11/04/2020 14 14 patch [facility]IE B[facility] [place]...         Nicotine Polacrilex   Dispensed Days Supply Quantity Provider Pharmacy   NICOTINE POLACRILEX 4 MG GUM 06/14/2021 10 110 [facility] ...   NICOTINE POLACRILEX 2 MG GUM 05/13/2021 10 220 [facility]...         OLANZapine   Dispensed Days Supply Quantity Provider Pharmacy   OLANZAPINE 10 MG TABS 05/24/2021 30 30 tablet [facility]...         OXcarbazepine   Dispensed Days Supply Quantity Provider Pharmacy   OXCARBAZEPINE 300 MG TABS 06/16/2021 10 10 tablet [facility] ...         Propranolol HCl   Dispensed Days Supply Quantity Provider Pharmacy   PROPRANOLOL HCL ER 80 MG CP24 07/16/2020 30 30 [facility]...         Thiamine HCl   Dispensed Days Supply Quantity Provider Pharmacy   THIAMINE  MG TABS 03/22/2021 30 30 tablet [facility]...   THIAMINE  MG TABS 02/17/2021 30 30 tablet HOWARD KHANNA [facility]...   THIAMINE  MG TABS 01/13/2021 30 30 tablet MOOKIE BACA [facility]...   THIAMINE  MG TABS 12/08/2020 30 30 tablet [facility]...   THIAMINE  MG TABS 11/12/2020 30 30 tablet [facility]...         Other   Dispensed Days Supply Quantity Provider Pharmacy   VITAMIN D3 25 MCG (1000 UT) TABS 06/14/2021 34 100 tablet [facility] ...   VITAMIN D3 25 MCG (1000 UT) TABS 05/13/2021 30 30 tablet [facility]   VITAMIN D3 25 MCG (1000 UT) TABS 03/12/2021 30 30 tablet HOWARD KHANNA [facility]...   VITAMIN D3 25 MCG (1000 UT) TABS 01/27/2021 30 30 tablet HOWARD KHANNA [facility]...   VITAMIN D3 25 MCG  (1000 UT) TABS 12/24/2020 30 30 tablet [facility]...   NALTREXONE HCL 50 MG TABS 12/08/2020 30 30 [facility]...   VITAMIN D3 25 MCG (1000 UT) TABS 11/25/2020 30 30 tablet [facility]...   NALTREXONE HCL 50 MG TABS 11/12/2020 30 30 tablet MOOKIE BACA [facility]...   THIAMINE  MG TABS 11/04/2020 30 30 tablet MOOKIE BACA [facility]...         buPROPion HCl   Dispensed Days Supply Quantity Provider Pharmacy   BUPROPION HCL ER (XL) 150 MG TB24 07/16/2020 30 30 tablet [facility]...         diazePAM   Dispensed Days Supply Quantity Provider Pharmacy   DIAZEPAM 10 MG TABS 06/25/2021 3 10 tablet KIRSTEN HENDRICKS [facility] ...   DIAZEPAM 10 MG TABS 06/11/2021 8 30 tablet KRISTEN HENDRICKS [facility] ...         traZODone HCl   Dispensed Days Supply Quantity Provider Pharmacy   TRAZODONE HCL 50 MG TABS 06/16/2021 10 10 tablet BRYAN ANDERSON [facility] ...

## 2021-07-15 NOTE — PLAN OF CARE
Problem: Behavioral Health Plan of Care  Goal: Patient-Specific Goal (Individualization)  Description: Patient will eat at least 50% of meals.  Patient will attend at least 50% of group therapy sessions.  Patient will complete all ADL's independently while on the unit.     Outcome: Improving     Problem: Behavioral Health Plan of Care  Goal: Adheres to Safety Considerations for Self and Others  Outcome: Improving     Problem: Behavioral Health Plan of Care  Goal: Optimized Coping Skills in Response to Life Stressors  Outcome: Improving     Problem: Suicide Risk  Goal: Absence of Self-Harm  Description: Patient will contract for safety if having thoughts of self harm.   Patient will report absence of suicidal ideations prior to discharge.   Outcome: Improving     Face to face shift report received from RN. Rounding completed, pt observed. Client was compliant with all medications as prescribed. Client showered and shaved this morning. Client met with  and mental health provider to discuss plan of care. Client has spent time between room and day giles area. He is pleasant and has made no suicidal/self harm gestures or statement this shift. Client has been appropriate with staff and peers. He has engaged in no physical or verbal aggression this shift. Face to face report will be communicated to oncoming RN.    Lyndon Rajan RN  7/15/2021  2:44 PM

## 2021-07-16 PROCEDURE — 99232 SBSQ HOSP IP/OBS MODERATE 35: CPT | Performed by: NURSE PRACTITIONER

## 2021-07-16 PROCEDURE — 124N000001 HC R&B MH

## 2021-07-16 PROCEDURE — 250N000013 HC RX MED GY IP 250 OP 250 PS 637: Performed by: NURSE PRACTITIONER

## 2021-07-16 RX ORDER — POLYETHYLENE GLYCOL 3350 17 G/17G
17 POWDER, FOR SOLUTION ORAL DAILY PRN
Status: DISCONTINUED | OUTPATIENT
Start: 2021-07-16 | End: 2021-07-19 | Stop reason: HOSPADM

## 2021-07-16 RX ADMIN — NICOTINE POLACRILEX 4 MG: 2 GUM, CHEWING ORAL at 20:16

## 2021-07-16 RX ADMIN — NICOTINE 1 PATCH: 14 PATCH, EXTENDED RELEASE TRANSDERMAL at 08:35

## 2021-07-16 RX ADMIN — NICOTINE POLACRILEX 4 MG: 2 GUM, CHEWING ORAL at 14:26

## 2021-07-16 RX ADMIN — NICOTINE POLACRILEX 4 MG: 2 GUM, CHEWING ORAL at 12:46

## 2021-07-16 RX ADMIN — FAMOTIDINE 20 MG: 20 TABLET ORAL at 21:00

## 2021-07-16 RX ADMIN — LISINOPRIL 20 MG: 10 TABLET ORAL at 08:35

## 2021-07-16 RX ADMIN — POLYETHYLENE GLYCOL 3350 17 G: 17 POWDER, FOR SOLUTION ORAL at 14:26

## 2021-07-16 RX ADMIN — NICOTINE POLACRILEX 4 MG: 2 GUM, CHEWING ORAL at 09:58

## 2021-07-16 RX ADMIN — NICOTINE POLACRILEX 4 MG: 2 GUM, CHEWING ORAL at 17:54

## 2021-07-16 RX ADMIN — NICOTINE POLACRILEX 4 MG: 2 GUM, CHEWING ORAL at 06:55

## 2021-07-16 RX ADMIN — FOLIC ACID 1 MG: 1 TABLET ORAL at 08:35

## 2021-07-16 RX ADMIN — ARIPIPRAZOLE 5 MG: 5 TABLET ORAL at 21:00

## 2021-07-16 RX ADMIN — MULTIPLE VITAMINS W/ MINERALS TAB 1 TABLET: TAB at 08:34

## 2021-07-16 RX ADMIN — NICOTINE POLACRILEX 4 MG: 2 GUM, CHEWING ORAL at 16:00

## 2021-07-16 RX ADMIN — Medication 100 MG: at 08:35

## 2021-07-16 RX ADMIN — FAMOTIDINE 20 MG: 20 TABLET ORAL at 08:35

## 2021-07-16 RX ADMIN — TRAZODONE HYDROCHLORIDE 50 MG: 50 TABLET ORAL at 21:02

## 2021-07-16 ASSESSMENT — ACTIVITIES OF DAILY LIVING (ADL)
ORAL_HYGIENE: INDEPENDENT
HYGIENE/GROOMING: INDEPENDENT
DRESS: SCRUBS (BEHAVIORAL HEALTH)
DRESS: SCRUBS (BEHAVIORAL HEALTH)
ORAL_HYGIENE: INDEPENDENT
LAUNDRY: UNABLE TO COMPLETE
HYGIENE/GROOMING: INDEPENDENT

## 2021-07-16 NOTE — PLAN OF CARE
Problem: Behavioral Health Plan of Care  Goal: Patient-Specific Goal (Individualization)  Description: Patient will eat at least 50% of meals.  Patient will attend at least 50% of group therapy sessions.  Patient will complete all ADL's independently while on the unit.     Outcome: Improving     Problem: Suicide Risk  Goal: Absence of Self-Harm  Description: Patient will contract for safety if having thoughts of self harm.   Patient will report absence of suicidal ideations prior to discharge.   Outcome: Improving     Face to face shift report received from RN. Rounding completed, pt observed.Client is eating meals and takes medications as prescribed. He completed all ADL's this morning. Client has not attended groups. He has remained free of physical/verbal aggression and interacts with some of his peers. Client is tentatively scheduled to discharge on Monday July 19 th 2021 to [facility]. Client is free of suicidal thought and is relieved to have a solid discharge plan.Face to face report will be communicated to oncoming RN.    Lyndon Rajan RN  7/16/2021  2:01 PM

## 2021-07-16 NOTE — PROGRESS NOTES
Chart reviewed. Labs reviewed. UA-wnl shows no infection. CBC and CMP unremarkable.     Pt medically stable, no acute medical concerns. Chronic medical problems stable. Will sign off. Please consult for any new medical issues or concerns.

## 2021-07-16 NOTE — PROGRESS NOTES
"[facility]  Psychiatric Progress Note     Impression     (per ED) Janusz Escalera is a 50 year old male who presented to the emergency department by ambulance with alcohol intoxication and suicidal ideation.  Patient states that he has been drinking mouthwash for the past 2 days.  He is supposed to go to chemical dependency treatment.  He has been staying with his sister.  Patient states that he does not care if he dies and was trying to elope from the emergency department.  Additional history is difficult to obtain at this time due to the patient's acute agitation and intoxication.  Patient has a history of mental health problems in addition to polysubstance chemical dependency and alcohol dependence.  His last mental health admission was on May 17.  Patient was recently in the emergency department at [facility] in July 1 with alcohol intoxication when he was found by the side of the road asleep next to a half empty bottle of whiskey.  Prior to that he has had alcohol ingestion of Listerine mouthwash.  Mental health diagnoses include depression, psychosis, mood disorder, and previous suicide attempts including hanging and overdose.  Patient denies any recent use of methamphetamines, bath salts or cannabis. The patient was medically cleared and admitted to our U for psychiatric evaluation and stabilization.     I found Janusz in the lounge eating lunch. He reports he \"had a change of heart\" about going to treatment after talking to our LADC. He recognizes the need for sober support and thinks he might have \"jumped the gun\" on pursuing employment instead. He's accepted to [facility] on Monday, July 19th, transport to be arranged. He feels \"relieved\" that there's a plan in place.     He otherwise denies any medication side effects, mood issues, suicidal/homicidal ideation, hallucinations or delusions, and indicated he is sleeping and eating well. He denies all symptoms of alcohol withdrawal and " has not been scoring on the CIWA.       Educated regarding medication indications, risks, benefits, side effects, contraindications and possible interactions. Verbally expressed understanding.      Diagnoses     Suicidal ideation  Unspecified depression, likely substance induced  Borderline Personality Disorder  Alcohol use disorder, severe  Methamphetamine use disorder, severe  Cannabis use disorder, severe  Polysubstance abuse including IVDU resulting in Hep C  History of SIB by swallowing sharp objects    Attestation:  Patient has been seen and evaluated by me,  SHAINA Bess CNP       Medications     I have reviewed this patient's current medications  Current Facility-Administered Medications Ordered in Epic   Medication Dose Route Frequency Last Rate Last Admin     acetaminophen (TYLENOL) tablet 650 mg  650 mg Oral Q4H PRN   650 mg at 07/15/21 2102     alum & mag hydroxide-simethicone (MAALOX) suspension 30 mL  30 mL Oral Q4H PRN         ARIPiprazole (ABILIFY) tablet 5 mg  5 mg Oral At Bedtime   5 mg at 07/15/21 2102     cloNIDine (CATAPRES) tablet 0.1 mg  0.1 mg Oral BID PRN         diazepam (VALIUM) tablet 10 mg  10 mg Oral Q30 Min PRN         famotidine (PEPCID) tablet 20 mg  20 mg Oral BID   20 mg at 07/16/21 0835     folic acid (FOLVITE) tablet 1 mg  1 mg Oral Daily   1 mg at 07/16/21 0835     hydrOXYzine (ATARAX) tablet  mg   mg Oral Q4H PRN   50 mg at 07/15/21 1337     lisinopril (ZESTRIL) tablet 20 mg  20 mg Oral Daily   20 mg at 07/16/21 0835     melatonin tablet 3-6 mg  3-6 mg Oral At Bedtime PRN         multivitamin w/minerals (THERA-VIT-M) tablet 1 tablet  1 tablet Oral Daily   1 tablet at 07/16/21 0834     nicotine (NICODERM CQ) 14 MG/24HR 24 hr patch 1 patch  1 patch Transdermal Daily   1 patch at 07/16/21 0835     nicotine (NICORETTE) gum 2-4 mg  2-4 mg Buccal Q1H PRN   4 mg at 07/16/21 0958     nicotine Patch in Place   Transdermal Q8H         polyethylene glycol (MIRALAX)  Packet 17 g  17 g Oral Daily PRN         thiamine tablet 100 mg  100 mg Oral Daily   100 mg at 07/16/21 0835     traZODone (DESYREL) tablet  mg   mg Oral At Bedtime PRN   100 mg at 07/14/21 2125     No current Epic-ordered outpatient medications on file.        10 point ROS - denies new concerns     Allergies     Allergies   Allergen Reactions     Bee Venom Anaphylaxis     Pt reported- has Epi Pen  Pt reported- has Epi Pen  Pt reported- has Epi Pen  Pt reported- has Epi Pen       Cashew Nut Oil Anaphylaxis     Pistachio Nut Extract Skin Test Other (See Comments)     Tongue Swelling          Psychiatric Examination     /84   Pulse 67   Temp 97.9  F (36.6  C) (Tympanic)   Resp 14   SpO2 98%   Weight is 0 lbs 0 oz  There is no height or weight on file to calculate BMI.    Appearance:  awake, alert, adequately groomed and dressed in hospital scrubs  Attitude:  cooperative  Eye Contact:  good  Mood:  good  Affect:  appropriate and in normal range  Speech:  clear, coherent  Psychomotor Behavior:  no evidence of tardive dyskinesia, dystonia, or tics  Thought Process:  linear and goal oriented  Associations:  no loose associations  Thought Content:  no evidence of suicidal ideation or homicidal ideation and no evidence of psychotic thought  Insight:  limited  Judgment:  poor  Oriented to:  time, person, and place  Attention Span and Concentration:  fair  Recent and Remote Memory:  fair  Fund of Knowledge: appropriate  Muscle Strength and Tone: normal  Gait and Station: Normal        Labs     Recent Results (from the past 24 hour(s))   Comprehensive metabolic panel    Collection Time: 07/15/21  1:18 PM   Result Value Ref Range    Sodium 137 133 - 144 mmol/L    Potassium 4.1 3.4 - 5.3 mmol/L    Chloride 104 94 - 109 mmol/L    Carbon Dioxide (CO2) 27 20 - 32 mmol/L    Anion Gap 6 3 - 14 mmol/L    Urea Nitrogen 15 7 - 30 mg/dL    Creatinine 0.87 0.66 - 1.25 mg/dL    Calcium 8.6 8.5 - 10.1 mg/dL    Glucose  93 70 - 99 mg/dL    Alkaline Phosphatase 102 40 - 150 U/L    AST 25 0 - 45 U/L    ALT 24 0 - 70 U/L    Protein Total 7.6 6.8 - 8.8 g/dL    Albumin 3.4 3.4 - 5.0 g/dL    Bilirubin Total 0.4 0.2 - 1.3 mg/dL    GFR Estimate >90 >60 mL/min/1.73m2   CBC with platelets and differential    Collection Time: 07/15/21  1:18 PM   Result Value Ref Range    WBC Count 5.9 4.0 - 11.0 10e3/uL    RBC Count 4.30 (L) 4.40 - 5.90 10e6/uL    Hemoglobin 13.4 13.3 - 17.7 g/dL    Hematocrit 37.6 (L) 40.0 - 53.0 %    MCV 87 78 - 100 fL    MCH 31.2 26.5 - 33.0 pg    MCHC 35.6 31.5 - 36.5 g/dL    RDW 12.8 10.0 - 15.0 %    Platelet Count 190 150 - 450 10e3/uL    % Neutrophils 62 %    % Lymphocytes 20 %    % Monocytes 11 %    % Eosinophils 6 %    % Basophils 1 %    % Immature Granulocytes 0 %    NRBCs per 100 WBC 0 <1 /100    Absolute Neutrophils 3.7 1.6 - 8.3 10e3/uL    Absolute Lymphocytes 1.2 0.8 - 5.3 10e3/uL    Absolute Monocytes 0.6 0.0 - 1.3 10e3/uL    Absolute Eosinophils 0.4 0.0 - 0.7 10e3/uL    Absolute Basophils 0.0 0.0 - 0.2 10e3/uL    Absolute Immature Granulocytes 0.0 <=0.0 10e3/uL    Absolute NRBCs 0.0 10e3/uL   UA with Microscopic reflex to Culture    Collection Time: 07/15/21  5:30 PM    Specimen: Urine, Clean Catch   Result Value Ref Range    Color Urine Straw Colorless, Straw, Light Yellow, Yellow    Appearance Urine Clear Clear    Glucose Urine Negative Negative mg/dL    Bilirubin Urine Negative Negative    Ketones Urine Negative Negative mg/dL    Specific Gravity Urine 1.007 1.003 - 1.035    Blood Urine Negative Negative    pH Urine 7.0 4.7 - 8.0    Protein Albumin Urine Negative Negative mg/dL    Urobilinogen Urine Normal Normal, 2.0 mg/dL    Nitrite Urine Negative Negative    Leukocyte Esterase Urine Negative Negative    RBC Urine 1 <=2 /HPF    WBC Urine <1 <=5 /HPF           Plan/Treatment Team     BEHAVIORAL TEAM DISCUSSION    Progress: mood improved, socializing appropriately with peers in the milieu    Continued Stay  Criteria/Rationale: patient accepted to [facility] on Monday, July 19th, time of transport TBD    Medical/Physical: nothing acute, denies symptoms of alcohol withdrawal    Precautions:     Falls precaution?: No    Behavioral Orders   Procedures     Code 1 - Restrict to Unit     Routine Programming     As clinically indicated     Status 15     Every 15 minutes.       Plan:   -Continue PTA Abilify 5 mg before bed  -Medicate any withdrawal symptoms per [facility] protocol  -Utilize PRNs for comfort and safety    Rationale for change in precautions or plan: discharge planning     Participants: SHAINA Bess CNP, Nursing, SW, OT, LADC, Dr. Oh    The patient's care was discussed with the treatment team and chart notes were reviewed.

## 2021-07-16 NOTE — PLAN OF CARE
Problem: Behavioral Health Plan of Care  Goal: Patient-Specific Goal (Individualization)  Description: Patient will eat at least 50% of meals.  Patient will attend at least 50% of group therapy sessions.  Patient will complete all ADL's independently while on the unit.     Outcome: Improving  Note:        Problem: Suicide Risk  Goal: Absence of Self-Harm  Description: Patient will contract for safety if having thoughts of self harm.   Patient will report absence of suicidal ideations prior to discharge.   Outcome: Improving       Pt in room laying on bed with eyes closed with regular respirations and position changes. Pt appeared to sleep 7 hours on night shift.

## 2021-07-16 NOTE — PLAN OF CARE
Problem: Behavioral Health Plan of Care  Goal: Patient-Specific Goal (Individualization)  Description: Patient will eat at least 50% of meals.  Patient will attend at least 50% of group therapy sessions.  Patient will complete all ADL's independently while on the unit.     Outcome: Improving  Note:        Problem: Suicide Risk  Goal: Absence of Self-Harm  Description: Patient will contract for safety if having thoughts of self harm.   Patient will report absence of suicidal ideations prior to discharge.   Outcome: Improving   Pt. Has been up and about on unit, eating at least 50% of meals, is independent with ADL's, is able to make needs be known, cooperative with treatment team recommendations, taking prescribed medications, denies suicidal ideation, has been up and ambulating in the halls listening to music with headphones on.  2102-  Pt. Requested/received trazodone 50 mg po for sleep.    Face to face end of shift report will be communicated to oncoming night shift RN.     Leyda Vilchis RN  7/16/2021  5:41 PM

## 2021-07-16 NOTE — PLAN OF CARE
Problem: Behavioral Health Plan of Care  Goal: Patient-Specific Goal (Individualization)  Description: Patient will eat at least 50% of meals.  Patient will attend at least 50% of group therapy sessions.  Patient will complete all ADL's independently while on the unit.     Outcome: Improving  Note: 15:40: Received end of shift report from TOMEKA Haney. Pt lying in bed, eyes closed, even, non-labored respirations noted.     17:45: Clean catch urine specimen collected; clear, yellow/straw in appearance; sent to lab via tube system, awaiting results.     19:00: Received urinalysis results; WNL.     22:45: Pt napped prior to dinner, denies SI/HI, hallucinations, denies need for prn pharmacological intervention for the exception of PRN acetaminophen 650 mg for mild back ache with HS medication administration. Effective relief noted. No group participation this pm shift. Insight appropriate to overall situation.     Face to face end of shift report to be communicated to on-coming [facility] staff.     Corrine Alegria RN  7/15/2021  10:50 PM           Problem: Behavioral Health Plan of Care  Goal: Optimized Coping Skills in Response to Life Stressors  Outcome: Improving     Problem: Suicide Risk  Goal: Absence of Self-Harm  Description: Patient will contract for safety if having thoughts of self harm.   Patient will report absence of suicidal ideations prior to discharge.   Outcome: Improving     Problem: Behavioral Health Plan of Care  Goal: Plan of Care Review  Recent Flowsheet Documentation  Taken 7/15/2021 2000 by Corrine Alegria RN  Plan of Care Reviewed With: patient  Patient Agreement with Plan of Care: agrees

## 2021-07-17 PROCEDURE — 250N000013 HC RX MED GY IP 250 OP 250 PS 637: Performed by: NURSE PRACTITIONER

## 2021-07-17 PROCEDURE — 124N000001 HC R&B MH

## 2021-07-17 RX ADMIN — NICOTINE POLACRILEX 4 MG: 2 GUM, CHEWING ORAL at 07:46

## 2021-07-17 RX ADMIN — TRAZODONE HYDROCHLORIDE 50 MG: 50 TABLET ORAL at 00:59

## 2021-07-17 RX ADMIN — NICOTINE POLACRILEX 4 MG: 2 GUM, CHEWING ORAL at 14:35

## 2021-07-17 RX ADMIN — FAMOTIDINE 20 MG: 20 TABLET ORAL at 20:16

## 2021-07-17 RX ADMIN — FOLIC ACID 1 MG: 1 TABLET ORAL at 08:43

## 2021-07-17 RX ADMIN — MULTIPLE VITAMINS W/ MINERALS TAB 1 TABLET: TAB at 08:43

## 2021-07-17 RX ADMIN — POLYETHYLENE GLYCOL 3350 17 G: 17 POWDER, FOR SOLUTION ORAL at 20:16

## 2021-07-17 RX ADMIN — NICOTINE 1 PATCH: 14 PATCH, EXTENDED RELEASE TRANSDERMAL at 08:42

## 2021-07-17 RX ADMIN — NICOTINE POLACRILEX 4 MG: 2 GUM, CHEWING ORAL at 12:25

## 2021-07-17 RX ADMIN — ACETAMINOPHEN 650 MG: 325 TABLET, FILM COATED ORAL at 20:16

## 2021-07-17 RX ADMIN — NICOTINE POLACRILEX 4 MG: 2 GUM, CHEWING ORAL at 18:29

## 2021-07-17 RX ADMIN — HYDROXYZINE HYDROCHLORIDE 100 MG: 25 TABLET, FILM COATED ORAL at 00:59

## 2021-07-17 RX ADMIN — TRAZODONE HYDROCHLORIDE 100 MG: 50 TABLET ORAL at 20:16

## 2021-07-17 RX ADMIN — ARIPIPRAZOLE 5 MG: 5 TABLET ORAL at 20:16

## 2021-07-17 RX ADMIN — ACETAMINOPHEN 650 MG: 325 TABLET, FILM COATED ORAL at 00:59

## 2021-07-17 RX ADMIN — Medication 100 MG: at 08:43

## 2021-07-17 RX ADMIN — NICOTINE POLACRILEX 4 MG: 2 GUM, CHEWING ORAL at 09:28

## 2021-07-17 RX ADMIN — LISINOPRIL 20 MG: 10 TABLET ORAL at 08:43

## 2021-07-17 RX ADMIN — FAMOTIDINE 20 MG: 20 TABLET ORAL at 08:43

## 2021-07-17 NOTE — PLAN OF CARE
Face to face end of shift report recived from Emma STEIN RN. Rounding completed and patient observed in the [facility]. No requests at this time.     11:00 Update: Patient reports he is hopeful for treatment. He appeared calm and polite. He took meds without complaint. He attends groups. He denied pain. His affect is bright and he is talkative. He talks rapidly. Patient denied depression but said he's been struggling with anxiety for some time now. He talked about his grandchild on the way and which types of treatment places he's been. He is able to talk with much insight and has learned a lot at treatment facilities but he said he isn't sure why he can't keep sober. He talked about regrets he had and talked with shame of himself. This writer tried to help him see a different side or to frame a different way of thinking. He seemed open to the conversation.     15:15 Update: Face to face end of shift report communicated to the charge nurse. This writer will continue to provide nursing services for patient throughout the next shift. Rounding completed and patient observed.    20:15 Update: Patient has been pleasant and cooperative. He endorsed pain and requested 650mg tylenol for pain in his low back rated 5 of 10. He also complained of constipation and requested miralax. Patient has remained hopeful and attended groups throughout the day. He denied needing any PRNs for anxiety. Patient requested 100mg Trazodone for sleep. He declined to take his getachew patch off at HS.     Face to face end of shift report communicated to oncoming RN.            Problem: Behavioral Health Plan of Care  Goal: Patient-Specific Goal (Individualization)  Description: Patient will eat at least 50% of meals.  Patient will attend at least 50% of group therapy sessions.  Patient will complete all ADL's independently while on the unit.     Outcome: No Change     Problem: Suicide Risk  Goal: Absence of Self-Harm  Description: Patient will contract for  safety if having thoughts of self harm.   Patient will report absence of suicidal ideations prior to discharge.   Outcome: Improving

## 2021-07-17 NOTE — PLAN OF CARE
Start of shift patient is observed to be lying in bed, eyes closed, respirations observed. Will continue to monitor.     0050 Patient is up yelling grumpily about new roommate snoring. Patient removes his ear plugs and tells direct care staff that he is upset about all the noise. Patient is observed to have ear plugs and headphones. He is assessed by nursing for withdrawal symptoms and only exhibits a slight bit of sweating and agitation is present. Nursing administers Tylenol for 1/1 generalized pain, Trazodone 50 mg PRN, and 100 mg of Hydroxyzine. See MAR for review. Patient has been sleeping since. Will continue to monitor.     Patient had only the one interruption in sleep. Appeared to be sleeping the rest of the shift.     Face to face end of shift report communicated to oncoming TOMEKA.     Emma Yao RN  7/17/2021

## 2021-07-18 PROCEDURE — 250N000013 HC RX MED GY IP 250 OP 250 PS 637: Performed by: NURSE PRACTITIONER

## 2021-07-18 PROCEDURE — 124N000001 HC R&B MH

## 2021-07-18 RX ADMIN — FAMOTIDINE 20 MG: 20 TABLET ORAL at 20:26

## 2021-07-18 RX ADMIN — ARIPIPRAZOLE 5 MG: 5 TABLET ORAL at 20:26

## 2021-07-18 RX ADMIN — NICOTINE 1 PATCH: 14 PATCH, EXTENDED RELEASE TRANSDERMAL at 08:48

## 2021-07-18 RX ADMIN — NICOTINE POLACRILEX 4 MG: 2 GUM, CHEWING ORAL at 14:55

## 2021-07-18 RX ADMIN — FOLIC ACID 1 MG: 1 TABLET ORAL at 08:49

## 2021-07-18 RX ADMIN — NICOTINE POLACRILEX 4 MG: 2 GUM, CHEWING ORAL at 12:46

## 2021-07-18 RX ADMIN — TRAZODONE HYDROCHLORIDE 100 MG: 50 TABLET ORAL at 21:22

## 2021-07-18 RX ADMIN — ACETAMINOPHEN 650 MG: 325 TABLET, FILM COATED ORAL at 17:59

## 2021-07-18 RX ADMIN — NICOTINE POLACRILEX 4 MG: 2 GUM, CHEWING ORAL at 15:56

## 2021-07-18 RX ADMIN — NICOTINE POLACRILEX 4 MG: 2 GUM, CHEWING ORAL at 17:59

## 2021-07-18 RX ADMIN — NICOTINE POLACRILEX 4 MG: 2 GUM, CHEWING ORAL at 07:48

## 2021-07-18 RX ADMIN — MULTIPLE VITAMINS W/ MINERALS TAB 1 TABLET: TAB at 08:49

## 2021-07-18 RX ADMIN — FAMOTIDINE 20 MG: 20 TABLET ORAL at 08:48

## 2021-07-18 RX ADMIN — NICOTINE POLACRILEX 4 MG: 2 GUM, CHEWING ORAL at 20:26

## 2021-07-18 RX ADMIN — LISINOPRIL 20 MG: 10 TABLET ORAL at 08:49

## 2021-07-18 RX ADMIN — Medication 100 MG: at 08:49

## 2021-07-18 RX ADMIN — NICOTINE POLACRILEX 4 MG: 2 GUM, CHEWING ORAL at 11:19

## 2021-07-18 NOTE — PLAN OF CARE
Face to face end of shift report received from Emma STEIN RN. Rounding completed and patient observed in the [facility]. No requests at this time.     11:00 Update: Patient reports he is anxious about treatment. He appeared tense but polite. He took meds without complaint. He attends groups. He denied pain. His affect is bright and he is talkative. He talks rapidly. Patient denied depression but said he's been struggling with anxiety. He denied pain. He is pressured and impatient today.     15:15 Update: Face to face end of shift report communicated to the charge nurse. This writer will continue to provide nursing services for patient throughout the next shift. Rounding completed and patient observed.    21:30 Update: Patient has been pleasant and cooperative. He requested 650mg tylenol at 17:59 for pain he rated 4 of 10 in his lower right tooth.  He attended groups and is supportive of peers. He maintains appropriate boundaries. He appeared nervous about discharge tomorrow. He had a 1:1 with the [facility] and seemed much more at peace with going to treatment. Patient requested 100mg trazodone at 21:22     Face to face end of shift report communicated to oncoming RN.      Problem: Behavioral Health Plan of Care  Goal: Patient-Specific Goal (Individualization)  Description: Patient will eat at least 50% of meals.  Patient will attend at least 50% of group therapy sessions.  Patient will complete all ADL's independently while on the unit.     Outcome: Improving     Problem: Suicide Risk  Goal: Absence of Self-Harm  Description: Patient will contract for safety if having thoughts of self harm.   Patient will report absence of suicidal ideations prior to discharge.   Outcome: Improving

## 2021-07-18 NOTE — PLAN OF CARE
Start of shift patient is observed to be lying in bed, eyes closed, respirations observed. Will continue to monitor.         Face to face end of shift report communicated to oncoming RN.      Emma Yao RN

## 2021-07-19 VITALS
WEIGHT: 195.2 LBS | TEMPERATURE: 98.7 F | OXYGEN SATURATION: 97 % | RESPIRATION RATE: 16 BRPM | BODY MASS INDEX: 27.22 KG/M2 | HEART RATE: 66 BPM | SYSTOLIC BLOOD PRESSURE: 119 MMHG | DIASTOLIC BLOOD PRESSURE: 68 MMHG

## 2021-07-19 PROCEDURE — 99238 HOSP IP/OBS DSCHRG MGMT 30/<: CPT | Performed by: NURSE PRACTITIONER

## 2021-07-19 PROCEDURE — 250N000013 HC RX MED GY IP 250 OP 250 PS 637: Performed by: NURSE PRACTITIONER

## 2021-07-19 RX ORDER — ARIPIPRAZOLE 5 MG/1
5 TABLET ORAL AT BEDTIME
Qty: 30 TABLET | Refills: 0 | Status: SHIPPED | OUTPATIENT
Start: 2021-07-19

## 2021-07-19 RX ORDER — TRAZODONE HYDROCHLORIDE 50 MG/1
50 TABLET, FILM COATED ORAL
Qty: 30 TABLET | Refills: 0 | Status: SHIPPED | OUTPATIENT
Start: 2021-07-19

## 2021-07-19 RX ORDER — FAMOTIDINE 20 MG/1
20 TABLET, FILM COATED ORAL 2 TIMES DAILY
Qty: 60 TABLET | Refills: 0 | Status: SHIPPED | OUTPATIENT
Start: 2021-07-19

## 2021-07-19 RX ORDER — FOLIC ACID 1 MG/1
1 TABLET ORAL DAILY
Qty: 30 TABLET | Refills: 0 | Status: SHIPPED | OUTPATIENT
Start: 2021-07-19

## 2021-07-19 RX ORDER — FOLIC ACID 1 MG/1
1 TABLET ORAL DAILY
Qty: 30 TABLET | Refills: 0 | Status: SHIPPED | OUTPATIENT
Start: 2021-07-19 | End: 2021-07-19

## 2021-07-19 RX ORDER — ARIPIPRAZOLE 5 MG/1
5 TABLET ORAL AT BEDTIME
Qty: 30 TABLET | Refills: 0 | Status: SHIPPED | OUTPATIENT
Start: 2021-07-19 | End: 2021-07-19

## 2021-07-19 RX ORDER — TRAZODONE HYDROCHLORIDE 50 MG/1
50 TABLET, FILM COATED ORAL AT BEDTIME
Qty: 30 TABLET | Refills: 0 | Status: SHIPPED | OUTPATIENT
Start: 2021-07-19 | End: 2021-07-19

## 2021-07-19 RX ORDER — LISINOPRIL 20 MG/1
20 TABLET ORAL DAILY
Qty: 30 TABLET | Refills: 0 | Status: SHIPPED | OUTPATIENT
Start: 2021-07-19

## 2021-07-19 RX ORDER — LISINOPRIL 20 MG/1
20 TABLET ORAL DAILY
Qty: 30 TABLET | Refills: 0 | Status: SHIPPED | OUTPATIENT
Start: 2021-07-19 | End: 2021-07-19

## 2021-07-19 RX ORDER — FAMOTIDINE 20 MG/1
20 TABLET, FILM COATED ORAL 2 TIMES DAILY
Qty: 30 TABLET | Refills: 0 | Status: SHIPPED | OUTPATIENT
Start: 2021-07-19 | End: 2021-07-19

## 2021-07-19 RX ADMIN — NICOTINE POLACRILEX 4 MG: 2 GUM, CHEWING ORAL at 03:49

## 2021-07-19 RX ADMIN — FOLIC ACID 1 MG: 1 TABLET ORAL at 08:16

## 2021-07-19 RX ADMIN — NICOTINE POLACRILEX 4 MG: 2 GUM, CHEWING ORAL at 05:40

## 2021-07-19 RX ADMIN — FAMOTIDINE 20 MG: 20 TABLET ORAL at 08:16

## 2021-07-19 RX ADMIN — NICOTINE 1 PATCH: 14 PATCH, EXTENDED RELEASE TRANSDERMAL at 08:18

## 2021-07-19 RX ADMIN — MULTIPLE VITAMINS W/ MINERALS TAB 1 TABLET: TAB at 08:16

## 2021-07-19 RX ADMIN — NICOTINE POLACRILEX 4 MG: 2 GUM, CHEWING ORAL at 09:03

## 2021-07-19 RX ADMIN — LISINOPRIL 20 MG: 10 TABLET ORAL at 08:16

## 2021-07-19 RX ADMIN — Medication 100 MG: at 08:16

## 2021-07-19 ASSESSMENT — ACTIVITIES OF DAILY LIVING (ADL)
LAUNDRY: UNABLE TO COMPLETE
DRESS: SCRUBS (BEHAVIORAL HEALTH)
ORAL_HYGIENE: INDEPENDENT
HYGIENE/GROOMING: INDEPENDENT

## 2021-07-19 NOTE — PLAN OF CARE
Received report from  that patient has not yet been picked up by [facility] transport.  reports he spoke to Dane, intake at [facility], who states he never had transport set up for today. This writer responded to Dane in the original chain of emails inquiring as to when the patient would be picked up. Dane states he will notify nursing staff today around 3:30 or 4pm once his transport route is finalized, and that the patient will be picked up sometime tomorrow.

## 2021-07-19 NOTE — PLAN OF CARE
Dane nancy [facility] called [facility] and stated there was a mixup and they were unable to pick the patient up today. He insists that [facility] staff will come tomorrow and transport patient. SW asked if they would accept patient by taxi. Dane stated they were unable to admit him today. Dane stated he will call the [facility] unit and update nursing staff today around 3:30 pm  or 4pm once his transport route is finalized, and that the patient will be picked up sometime tomorrow.     Updated patient that [facility] wont pick him up until tomorrow. Patient voiced his frustration with [facility] and stated he wants to leave. Patient provided an address of: [address] [place] [address]. Where he wants to have transport setup to  his bike. Patient also provided a phone number to Lionel Saleh with the Department [facility] Human Services in [place].     SW called Lionel Saleh and left message that patient will be in contact to go to outpatient treatment.

## 2021-07-19 NOTE — DISCHARGE INSTRUCTIONS
Behavioral Discharge Planning and Instructions    Summary: Patient is a 50 year old male that presented with suicidal ideation and alcohol intoxication. Patient stated he was drinking mouth wash for a couple of days.     Main Diagnosis:   Suicidal ideation  Unspecified depression, likely substance induced  Borderline Personality Disorder  Alcohol use disorder, severe  Methamphetamine use disorder, severe  Cannabis use disorder, severe  Polysubstance abuse including IVDU resulting in Hep C  History of SIB by swallowing sharp objects    Health Care Follow-up:     [facility]  [address] [place] [address]  Phone: [phone]  Fax: [phone]    [facility]  [address] [place] [address]  Phone: [phone]  Fax: [phone]  Email: [email or web address]    [address] [address]  Eleanor Saleh  - Patient is to call and follow up after discharge to arrange outpatient treatment.   [phone]   Appointment: Patient is to call [phone] and ask to be call to Triage to schedule Primary Care appointment and to schedule Behavioral Health appointments.   Medical Phone: [phone]  Dental Phone: [phone]  Fax: [phone]      Attend all scheduled appointments with your outpatient providers. Call at least 24 hours in advance if you need to reschedule an appointment to ensure continued access to your outpatient providers.     Major Treatments, Procedures and Findings:  You were provided with: a psychiatric assessment, assessed for medical stability, medication evaluation and/or management, group therapy, family therapy, individual therapy, CD evaluation/assessment, milieu management and medical interventions    Symptoms to Report: feeling more aggressive, increased confusion, losing more sleep, mood getting worse or thoughts of suicide    Early warning signs can include: increased depression or anxiety sleep disturbances increased  "thoughts or behaviors of suicide or self-harm  increased unusual thinking, such as paranoia or hearing voices        Resources:   Crisis Intervention: 484.944.7167 or 734-710-7129 (TTY: 665.715.3107).  Call anytime for help.  National [place] on Mental Illness (www.[place].dangelo.org): 257.207.6503 or 508-261-2719.  [place] Association for Children's Mental Health (www.mac.org): 173.276.1928.  Alcoholics Anonymous (www.alcoholics-anonymous.org): Check your phone book for your local chapter.  Suicide Awareness Voices of Education (SAVE) (www.save.org): 874-803-UIVM (6331)  National Suicide Prevention Line (www.mentalhealthmn.org): 162-866-KNVF (2807)  Mental Health Consumer/Survivor Network of [place] (www.mhcsn.net): 718.631.5814 or 087-623-1610  Mental Health Association of [place] (www.mentalhealth.org): 398.593.9484 or 143-017-5902  Self- Management and Recovery Training., SMART-- Toll free: 384.503.2123  www.Living Proof.Minutizer  Text 4 Life: txt \"LIFE\" to 87948 for immediate support and crisis intervention  Crisis text line: Text \"[place]\" to 367530. Free, confidential, 24/7.    General Medication Instructions:   See your medication sheet(s) for instructions.   Take all medicines as directed.  Make no changes unless your doctor suggests them.   Go to all your doctor visits.  Be sure to have all your required lab tests. This way, your medicines can be refilled on time.  Do not use any drugs not prescribed by your doctor.  Avoid alcohol.    Advance Directives:   Scanned document on file with [employer]? No scanned doc  Is document scanned? Pt states no documents  Honoring Choices Your Rights Handout: Informed and given  Was more information offered? Pt declined    The Treatment team has appreciated the opportunity to work with you. If you have any questions or concerns about your recent admission, you can contact the unit which can receive your call 24 hours a day, 7 days a week. They will be able to get in touch with a Provider if needed. " "The unit number is [phone] .    [place] Area:  [facility], Crisis stabilization [facility]- [phone]  [facility] Crisis Line: 1-334.976.9570  [facility] For Family [facility]: 166-0626  Sexual Assault Program of [facility]: 770.102.9237 or 1-539.518.3914  [place] Battered Women's Program: 1-019-221-1419 Ext: 279       Calls answered Mon-Fri-8:00 am--4:30 pm    [place]:  [facility] for Family Peace: 5-832-149-6507  [facility] - 9-215-233-0457  [facility] first call for help: 3-517-124-2957  [facility]:  (134) 390-8575    [place] Area:  Warm Line: 1-231.801.3351       Calls answered Tuesday--Saturday 4:00 pm--10:00 pm  [facility] [facility] Crisis Line - 272.746.9303  [facility] 919-319-7895    [place] Statewide:  [place] Crisis and Referral Services: 9-820-254-3169  National Suicide Prevention Lifeline: 4-223-009-TALK (5961)   - gpp7qyfy- Text \"Life\" to 67422  First Call for Help: 2-1-1  [facility] Helpline- 4-610-DDZV-HELP   Crisis Text Line: Text  [place]  to 718665  "

## 2021-07-19 NOTE — DISCHARGE SUMMARY
Psychiatric Discharge Summary    Janusz Escalera MRN# [id number]   Age: 50 year old YOB: 1970     Date of Admission:  7/14/2021  Date of Discharge:  7/19/2021  Admitting Provider:  Laura Oh MD  Discharging Provider:  SHAINA Bess CNP          Event Leading to Hospitalization and Hospital Stay     (per ED) Janusz Escalera is a 50 year old male who presented to the emergency department by ambulance with alcohol intoxication and suicidal ideation.  Patient states that he has been drinking mouthwash for the past 2 days.  He is supposed to go to chemical dependency treatment.  He has been staying with his sister.  Patient states that he does not care if he dies and was trying to elope from the emergency department.  Additional history is difficult to obtain at this time due to the patient's acute agitation and intoxication.  Patient has a history of mental health problems in addition to polysubstance chemical dependency and alcohol dependence.  His last mental health admission was on May 17.  Patient was recently in the emergency department at [facility] in July 1 with alcohol intoxication when he was found by the side of the road asleep next to a half empty bottle of whiskey.  Prior to that he has had alcohol ingestion of Listerine mouthwash.  Mental health diagnoses include depression, psychosis, mood disorder, and previous suicide attempts including hanging and overdose.  Patient denies any recent use of methamphetamines, bath salts or cannabis. The patient was medically cleared and admitted to our BHU for psychiatric evaluation and stabilization.     Hospital course: I continued Janusz's PTA Abilify while hospitalized. The patient denies issues with sleep or appetite.  The patient is going to groups and participates appropriately in the unit's milieu. The patient denies depression, anxiety, and any signs/symptoms of alcohol withdrawal.     He otherwise denies any medication side  effects, mood issues, suicidal/homicidal ideation, hallucinations or delusions, and indicated he is sleeping and eating well. He is eager to attend treatment and doesn't offer any additional concerns at this time.          Patient had originally planned to discharge to [facility] for treatment, but they didn't arrange transport like they said they would, so SW has arranged for a taxi to bring him back home to [place]. Medications sent to  Pharmacy in [place], per his request.     In my professional opinion, the patient is not holdable or committable, so I am discharging them at this time.     At time of discharge, there is no evidence that patient is in immediate danger of self or others.          Diagnoses:     Suicidal ideation  Unspecified depression, likely substance induced  Borderline Personality Disorder  Alcohol use disorder, severe  Methamphetamine use disorder, severe  Cannabis use disorder, severe  Polysubstance abuse including IVDU resulting in Hep C  History of SIB by swallowing sharp objects            Labs:   No results found for this or any previous visit (from the past 24 hour(s)).           Discharge Medications:        Review of your medicines      CONTINUE these medicines which have NOT CHANGED      Dose / Directions   ARIPiprazole 5 MG tablet  Commonly known as: ABILIFY      Dose: 5 mg  Take 1 tablet (5 mg) by mouth At Bedtime  Quantity: 30 tablet  Refills: 0     famotidine 20 MG tablet  Commonly known as: PEPCID      Dose: 20 mg  Take 1 tablet (20 mg) by mouth 2 times daily  Quantity: 30 tablet  Refills: 0     folic acid 1 MG tablet  Commonly known as: FOLVITE      Dose: 1 mg  Take 1 tablet (1 mg) by mouth daily  Quantity: 30 tablet  Refills: 0     lisinopril 20 MG tablet  Commonly known as: ZESTRIL      Dose: 20 mg  Take 1 tablet (20 mg) by mouth daily  Quantity: 30 tablet  Refills: 0     nicotine polacrilex 4 MG gum  Commonly known as: NICORETTE  Used for: Need for nicotine replacement  therapy      Dose: 4 mg  Place 1 each (4 mg) inside cheek every hour as needed for smoking cessation (while awake)  Quantity: 1 each  Refills: 0     traZODone 50 MG tablet  Commonly known as: DESYREL  Used for: Sleep disturbances      Dose: 50 mg  Take 1 tablet (50 mg) by mouth At Bedtime  Quantity: 30 tablet  Refills: 0        STOP taking    naltrexone 50 MG tablet  Commonly known as: DEPADE/REVIA        OXcarbazepine 300 MG tablet  Commonly known as: TRILEPTAL        vitamin D3 25 MCG (1000 UT) Caps              Where to get your medicines      These medications were sent to [facility], [place] - [address]  [address], [facility] [address]    Phone: [phone]     ARIPiprazole 5 MG tablet    famotidine 20 MG tablet    folic acid 1 MG tablet    lisinopril 20 MG tablet    nicotine polacrilex 4 MG gum    traZODone 50 MG tablet               Psychiatric Examination:     Appearance:  awake, alert, adequately groomed and dressed in hospital scrubs  Attitude:  cooperative  Eye Contact:  good  Mood:  good  Affect:  appropriate and in normal range  Speech:  clear, coherent  Psychomotor Behavior:  no evidence of tardive dyskinesia, dystonia, or tics  Thought Process:  logical, linear and goal oriented  Associations:  no loose associations  Thought Content:  no evidence of suicidal ideation or homicidal ideation and no evidence of psychotic thought  Insight:  fair  Judgment:  fair  Oriented to:  time, person, and place  Attention Span and Concentration:  fair  Recent and Remote Memory:  fair  Fund of Knowledge: appropriate  Muscle Strength and Tone: normal  Gait and Station: Normal         Discharge Plan:     -Continue PTA Abilify 5 mg before bed  -Continue Trazodone 50 mg as needed before bed for sleep    Take all medications as prescribed  Abstain from using any illicit drugs or alcohol  Attend all follow-up appointments as scheduled     The patient was  given information for local crisis services and was encouraged to contact the U with any questions or concerns that might arise post-discharge.     Attestation:  The patient has been seen and evaluated by me,  SHAINA Bess CNP         Discharge Services Provided:   30   minutes spent on discharge services, including:  Final examination of patient.  Review and discussion of hospital stay.  Instructions for continued outpatient care/goals.  Preparation of discharge records.  Preparation of medications refills and new prescriptions.  Preparation of applicable referral forms.

## 2021-07-19 NOTE — PLAN OF CARE
Pt is discharging at the recommendation of the treatment team. Pt is discharging to to his bike in [place], [place] Treatment transported by [employer]. Pt denies having any thoughts of hurting themself or anyone else. Pt denies anxiety or depression. Pt has follow up with with the [facility] for Primary Care  providers for Therapy, Out patient treatment services and Medication Management. Discharge instructions, including; demographic sheet, psychiatric evaluation, discharge summary, and AVS were faxed to these next level of care providers.

## 2021-07-19 NOTE — PLAN OF CARE
"  Problem: Behavioral Health Plan of Care  Goal: Patient-Specific Goal (Individualization)  Description: Patient will eat at least 50% of meals.  Patient will attend at least 50% of group therapy sessions.  Patient will complete all ADL's independently while on the unit.     Outcome: Improving  Note: Shift Summery:      0130 Patient is in bed with eyes closed and regular resps.    0630 [facility]. No current complaints.    Face to face end of shift report communicated to 7-3 shift RN.     Amee Alcaraz RN  7/19/2021  6:46 AM        Patient's Stated Goal for Shift:  \"no stated goal\"    Goal Status:  In Process       Problem: Suicide Risk  Goal: Absence of Self-Harm  Description: Patient will contract for safety if having thoughts of self harm.   Patient will report absence of suicidal ideations prior to discharge.   Outcome: Improving     "

## 2021-07-19 NOTE — PLAN OF CARE
"  Problem: Behavioral Health Plan of Care  Goal: Patient-Specific Goal (Individualization)  Description: Patient will eat at least 50% of meals.  Patient will attend at least 50% of group therapy sessions.  Patient will complete all ADL's independently while on the unit.     Patient is pleasant and cooperative with nursing assessment and medication administration. Affect is full range, mood is calm. He denies SI, HI, anxiety, depression, hallucinations, and pain. States he feels good, and is \"ready to go to the next place\". States he wasn't ready for treatment before, but feels he is ready now. He does not score on the CIWA scale. Has been appropriate with staff and peers.   Since [facility] is not picking patient up today, states he wants to go home today and participate in outpatient services, will update provider.    Face to face end of shift report communicated to oncoming RN.     Liz Santos RN  7/19/2021  2:46 PM    Outcome: Improving    Problem: Suicide Risk  Goal: Absence of Self-Harm  Description: Patient will contract for safety if having thoughts of self harm.   Patient will report absence of suicidal ideations prior to discharge.     Patient denies SI, has remained free from self harm/injury.   Outcome: Improving          "

## 2022-02-17 PROBLEM — Z91.52 HISTORY OF NON-SUICIDAL SELF-HARM: Status: ACTIVE | Noted: 2020-08-10

## 2023-05-10 NOTE — PROGRESS NOTES
"[facility]  Psychiatric Progress Note      Impression:   Janusz Escalera is a 46 year old male who presented via [facility] ED with reports of 2 suicide attempts the day of admission. Apparently attempted to hang himself both times while in the woods. Notes indicate that he blacked out after the first attempt and woke with a bloody nose, freed himself and called his sister to bring to the ED. Second attempt details are unclear. Recent Meth use. History of SIB by swallowing foreign objects. CT of neck and xray of abdomen completed with no abnormalities. Sister reported that he will often voluntarily admit self to a facility and then leave. He was placed on a hold.     Told me this morning that he was doing very well. Slept well last night without trazodone and does not feel hung over this morning. Received hold and pre-petition paperwork and is hopeful about moving forward.  Was upset this morning about his roommates habits and did request to have his room changed. County screener came shortly after to screen him. Later, he reported to his nurse that he swallowed several pieces of colored pencils. When we discussed this, he reported swallowing 8 whole colored pencils and feels that this relieved his anxiety, but then asked for \"something for anxiety.\" Abd/lower chest xray ordered. Preliminary results do not indicate the presence of foreign objects. He is now in a room in the MHICU for better monitoring. Nursing will monitor and if it is determined that there is the presence of foreign objects, will consult with the surgeon.          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          Medications:     Prescription Medications as of 5/23/2017             HALOPERIDOL PO Take 5 mg by mouth At Bedtime    Mirtazapine (REMERON PO) Take 30 mg by mouth At Bedtime    TRAZODONE HCL PO Take 100 mg by mouth At Bedtime      Facility Administered Medications as of 5/23/2017             carbamide peroxide (DEBROX) 6.5 % otic solution 3 drop Place 3 drops Into the left ear 2 times daily    traZODone (DESYREL) tablet 100 mg Take 1 tablet (100 mg) by mouth nightly as needed for sleep    hydrOXYzine (ATARAX) tablet 25-50 mg Take 1-2 tablets (25-50 mg) by mouth every 4 hours as needed for anxiety    acetaminophen (TYLENOL) tablet 650 mg Take 2 tablets (650 mg) by mouth every 4 hours as needed for mild pain    alum & mag hydroxide-simethicone (MYLANTA ES/MAALOX  ES) suspension 30 mL Take 30 mLs by mouth every 4 hours as needed for indigestion    magnesium hydroxide (MILK OF MAGNESIA) suspension 30 mL Take 30 mLs by mouth nightly as needed for constipation    bisacodyl (DULCOLAX) Suppository 10 mg Place 1 suppository (10 mg) rectally daily as needed for constipation    OLANZapine (zyPREXA) tablet 10 mg Take 1 tablet (10 mg) by mouth every 2 hours as needed for agitation (associated with psychosis or rufus)    Linked Group 1:  \"Or\" Linked Group Details     OLANZapine (zyPREXA) injection 10 mg Inject 10 mg into the muscle every 2 hours as needed for agitation (associated with psychosis or rufus)    Linked Group 1:  \"Or\" Linked Group Details     nicotine polacrilex (NICORETTE) gum 2-4 mg Place 1-2 each (2-4 mg) inside cheek every hour as needed for other (nicotine withdrawal symptoms)    hypromellose-dextran (ARTIFICAL TEARS) ophthalmic solution 1 drop Place 1 drop into both eyes 3 times daily    eye wash (BSS PLUS) ophthalmic solution 15 mL Place 15 mLs into " "both eyes 2 times daily as needed (contact storage)    haloperidol (HALDOL) tablet 5 mg Take 1 tablet (5 mg) by mouth At Bedtime    mirtazapine (REMERON) tablet 15 mg Take 1 tablet (15 mg) by mouth At Bedtime    haloperidol (HALDOL) tablet 5 mg Take 1 tablet (5 mg) by mouth 3 times daily as needed for agitation    nicotine Patch in Place Place onto the skin every 8 hours    nicotine patch REMOVAL Place onto the skin daily    nicotine (NICODERM CQ) 21 MG/24HR 24 hr patch 1 patch Place 1 patch onto the skin daily    skin closure adhesive (DERMABOND PEN) pen Apply topically daily    cloNIDine (CATAPRES) tablet 0.1 mg Take 1 tablet (0.1 mg) by mouth 2 times daily as needed (anxiety / methamphetamine withdrawal symptoms)    bacitracin ointment Apply topically 2 times daily as needed for wound care        10 point ROS positive for blisters between toes - remain intact, and abraised are left external nostril.       Allergies:     Allergies   Allergen Reactions     Cashews [Nuts] Swelling     Tongue, throat swelling            Psychiatric Examination:   /77  Pulse 68  Temp 97.5  F (36.4  C) (Tympanic)  Resp 16  Ht 1.803 m (5' 11\")  Wt 80.6 kg (177 lb 11.1 oz)  SpO2 98%  BMI 24.78 kg/m2  Weight is 177 lbs 11.05 oz  Body mass index is 24.78 kg/(m^2).    Appearance:  awake, alert, adequately groomed and dressed in hospital scrubs  Attitude:  cooperative  Eye Contact:  good  Mood:  Reportedly good but also anxious  Affect:  mood congruent  Speech:  clear, coherent  Psychomotor Behavior:  no evidence of tardive dyskinesia, dystonia, or tics  Thought Process:  logical, linear and goal oriented  Associations:  no loose associations  Thought Content:  no evidence of suicidal ideation or homicidal ideation and no evidence of psychotic thought  Insight:  good  Judgment:  poor - clearly very impulsive  Oriented to:  time, person, and place  Attention Span and Concentration:  intact  Recent and Remote Memory:  intact  Fund " of Knowledge: appropriate  Muscle Strength and Tone: normal  Gait and Station: Normal             Labs:     Results for orders placed or performed during the hospital encounter of 05/20/17   XR Abdomen 2 Views    Narrative    ABDOMEN TWO VIEW 5/20/2017 7:24 PM     COMPARISON: 2 view abdomen 12/24/2016.    HISTORY: Rule out foreign body.    FINDINGS: Intravenous contrast in the renal excretory phase is noted.  Abdominal bowel gas pattern is nonspecific. There is no evidence for  free intraperitoneal air.      Impression    IMPRESSION: No evidence for bowel obstruction or free air. No  radiopaque foreign bodies identified.    LUIZ BOYER MD   CT Head w/o Contrast    Narrative    CT OF THE HEAD WITHOUT CONTRAST 5/20/2017 7:20 PM     COMPARISON: None    HISTORY:  Post attempted hanging.    TECHNIQUE: Axial CT images of the head from the skull base to the  vertex were acquired without IV contrast.    FINDINGS: The ventricles and basal cisterns are within normal limits  in configuration. There is no midline shift. There are no extra-axial  fluid collections.  Gray-white differentiation is well maintained.    No intracranial hemorrhage, mass or recent infarct.    The visualized paranasal sinuses are well-aerated. There is no  mastoiditis. There are no fractures of the visualized bones.      Impression    IMPRESSION:  Normal head CT.      Radiation dose for this scan was reduced using automated exposure  control, adjustment of the mA and/or kV according to patient size, or  iterative reconstruction technique    LUIZ BOYER MD   Soft tissue neck CT w contrast    Narrative    CT OF THE NECK WITH CONTRAST  5/20/2017 7:20 PM     COMPARISON: None    HISTORY: Post attempted hanging.    TECHNIQUE:  Axial CT images of the neck were acquired after the  intravenous administration of 80 mL Isovue 370  nonionic iodinated  contrast material. Coronal reconstructions were created.    FINDINGS: There is no cervical lymphadenopathy.  There are no fluid  collections or abscesses in the neck. There is no evidence for  hematoma in the soft tissues of the neck. The salivary glands are  unremarkable. The thyroid gland is within normal limits. The cervical  arterial vasculature is unremarkable. The internal jugular veins  bilaterally are unremarkable. There is no sinusitis or mastoiditis.  There are no fractures of the visualized bones. There are degenerative  changes throughout the cervical spine.      Impression    IMPRESSION: Normal soft tissue neck CT.      Radiation dose for this scan was reduced using automated exposure  control, adjustment of the mA and/or kV according to patient size, or  iterative reconstruction technique    LUIZ BOYER MD   CBC with platelets differential   Result Value Ref Range    WBC 7.8 4.0 - 11.0 10e9/L    RBC Count 3.99 (L) 4.4 - 5.9 10e12/L    Hemoglobin 11.8 (L) 13.3 - 17.7 g/dL    Hematocrit 35.7 (L) 40.0 - 53.0 %    MCV 90 78 - 100 fl    MCH 29.6 26.5 - 33.0 pg    MCHC 33.1 31.5 - 36.5 g/dL    RDW 13.0 10.0 - 15.0 %    Platelet Count 228 150 - 450 10e9/L    Diff Method Automated Method     % Neutrophils 66.7 %    % Lymphocytes 21.3 %    % Monocytes 10.2 %    % Eosinophils 1.0 %    % Basophils 0.5 %    % Immature Granulocytes 0.3 %    Absolute Neutrophil 5.2 1.6 - 8.3 10e9/L    Absolute Lymphocytes 1.7 0.8 - 5.3 10e9/L    Absolute Monocytes 0.8 0.0 - 1.3 10e9/L    Absolute Eosinophils 0.1 0.0 - 0.7 10e9/L    Absolute Basophils 0.0 0.0 - 0.2 10e9/L    Abs Immature Granulocytes 0.0 0 - 0.4 10e9/L   Comprehensive metabolic panel   Result Value Ref Range    Sodium 139 133 - 144 mmol/L    Potassium 3.5 3.4 - 5.3 mmol/L    Chloride 104 94 - 109 mmol/L    Carbon Dioxide 26 20 - 32 mmol/L    Anion Gap 9 3 - 14 mmol/L    Glucose 88 70 - 99 mg/dL    Urea Nitrogen 21 7 - 30 mg/dL    Creatinine 0.62 (L) 0.66 - 1.25 mg/dL    GFR Estimate >90  Non  GFR Calc   >60 mL/min/1.7m2    GFR Estimate If Black  >90   GFR Calc   >60 mL/min/1.7m2    Calcium 8.4 (L) 8.5 - 10.1 mg/dL    Bilirubin Total 1.0 0.2 - 1.3 mg/dL    Albumin 3.7 3.4 - 5.0 g/dL    Protein Total 7.6 6.8 - 8.8 g/dL    Alkaline Phosphatase 88 40 - 150 U/L    ALT 80 (H) 0 - 70 U/L     (H) 0 - 45 U/L   Alcohol ethyl   Result Value Ref Range    Ethanol g/dL <0.01 <0.01 g/dL   Urine Drugs of Abuse Screen Panel 13   Result Value Ref Range    Cannabinoids (90-bub-3-carboxy-9-THC) (A) NDET ng/mL     Detected, Abnormal Result   Cutoff for a positive cannabinoid is greater than 50 ng/ml.   This is an unconfirmed screening result to be used for medical purposes only.   Order [id number] for confirmation or individual confirmation tests to [facility].      Phencyclidine (Phencyclidine)  NDET ng/mL     Not Detected   Cutoff for a negative PCP is 25 ng/mL or less.      Cocaine (Benzoylecgonine)  NDET ng/mL     Not Detected   Cutoff for a negative cocaine is 150 ng/ml or less.      Methamphetamine (d-Methamphetamine) (A) NDET ng/mL     Detected, Abnormal Result   Cutoff for a positive methamphetamine is greater than 500 ng/ml.   This is an unconfirmed screening result to be used for medical purposes only.   Order [id number] for confirmation or individual confirmation tests to [facility].      Opiates (Morphine)  NDET ng/mL     Not Detected   Cutoff for a negative opiate is 100 ng/ml or less.      Amphetamine (d-Amphetamine) (A) NDET ng/mL     Detected, Abnormal Result   Cutoff for a positive amphetamine is greater than 500 ng/ml.   This is an unconfirmed screening result to be used for medical purposes only.   Order [id number] for confirmation or individual confirmation tests to [facility].      Benzodiazepines (Nordiazepam)  NDET ng/mL     Not Detected   Cutoff for a negative benzodiazepine is 150 ng/ml or less.      Tricyclic Antidepressants (Desipramine)  NDET ng/mL     Not Detected   Cutoff for a negative tricyclic antidepressant is 300 ng/ml or less.       Methadone (Methadone)  NDET ng/mL     Not Detected   Cutoff for a negative methadone is 200 ng/ml or less.      Barbiturates (Butalbital)  NDET ng/mL     Not Detected   Cutoff for a negative barbituate is 200 ng/ml or less.      Oxycodone (Oxycodone)  NDET ng/mL     Not Detected   Cutoff for a negative Oxycodone is 100 ng/mL or less.      Propoxyphene (Norpropoxyphene)  NDET ng/mL     Not Detected   Cutoff for a negative propoxyphene is 300 ng/ml or less      Buprenorphine (Buprenorphine)  NDET ng/mL     Not Detected   Cutoff for a negative buprenorphine is 10 ng/ml or less       No new labs today.            Plan:     Continue Haldol 5mg at bed and tid prn for psychosis.  Continue Remeron 15mg at bed.  Petition for commitment filed  MHICU bed for increased observation   Paramedian Forehead Flap Text: A decision was made to reconstruct the defect utilizing an interpolation axial flap and a staged reconstruction.  A telfa template was made of the defect.  This telfa template was then used to outline the paramedian forehead pedicle flap.  The donor area for the pedicle flap was then injected with anesthesia.  The flap was excised through the skin and subcutaneous tissue down to the layer of the underlying musculature.  The pedicle flap was carefully excised within this deep plane to maintain its blood supply.  The edges of the donor site were undermined.   The donor site was closed in a primary fashion.  The pedicle was then rotated into position and sutured.  Once the tube was sutured into place, adequate blood supply was confirmed with blanching and refill.  The pedicle was then wrapped with xeroform gauze and dressed appropriately with a telfa and gauze bandage to ensure continued blood supply and protect the attached pedicle.

## 2024-09-06 NOTE — PLAN OF CARE
Problem: Goal Outcome Summary  Goal: Goal Outcome Summary  Pt appeared to be sleeping throughout shift, normal respirations and position changes noted.        Pt reports blood sugar of 41 and feeling lethargic. Dr Nova notified and advised to wait to initiate treatment until blood sugar began to trend upwards. Pt given a total of 24oz of orange juice and two 6 packs of peanut butter crackers. Blood sugar trending upwards. Treatment initiated. Will continue to monitor.